# Patient Record
Sex: FEMALE | Race: WHITE | NOT HISPANIC OR LATINO | Employment: OTHER | URBAN - METROPOLITAN AREA
[De-identification: names, ages, dates, MRNs, and addresses within clinical notes are randomized per-mention and may not be internally consistent; named-entity substitution may affect disease eponyms.]

---

## 2017-01-06 ENCOUNTER — DOCUMENTATION ONLY (OUTPATIENT)
Dept: NEUROLOGY | Facility: CLINIC | Age: 47
End: 2017-01-06

## 2017-01-06 NOTE — PROGRESS NOTES
Received Physical Therapy Progress Note from Mariya GAMINO on 1/5/2016.  Placed in KK folder for review

## 2017-01-09 ENCOUNTER — DOCUMENTATION ONLY (OUTPATIENT)
Dept: NEUROLOGY | Facility: CLINIC | Age: 47
End: 2017-01-09

## 2017-01-09 NOTE — PROGRESS NOTES
Physical therapy progress note received from [facility].  Patient to continue with PT for estimated additional 4-6 weeks of therapy  Will upload note into [facility] for future reference

## 2017-01-10 RX ORDER — DIMETHYL FUMARATE 240 MG/1
CAPSULE ORAL
Qty: 180 CAPSULE | Refills: 0 | Status: CANCELLED | OUTPATIENT
Start: 2017-01-10

## 2017-02-03 ENCOUNTER — TELEPHONE (OUTPATIENT)
Dept: NEUROLOGY | Facility: CLINIC | Age: 47
End: 2017-02-03

## 2017-02-03 NOTE — TELEPHONE ENCOUNTER
----- Message from Britta Lynch sent at 2/3/2017  8:57 AM CST -----  Contact: Located within [facility] pharmacy     [phone]  Calling to request a refill for techfidera 240 mg.

## 2017-02-09 ENCOUNTER — OFFICE VISIT (OUTPATIENT)
Dept: NEUROLOGY | Facility: CLINIC | Age: 47
End: 2017-02-09
Payer: COMMERCIAL

## 2017-02-09 ENCOUNTER — TELEPHONE (OUTPATIENT)
Dept: NEUROLOGY | Facility: CLINIC | Age: 47
End: 2017-02-09

## 2017-02-09 ENCOUNTER — LAB VISIT (OUTPATIENT)
Dept: LAB | Facility: HOSPITAL | Age: 47
End: 2017-02-09
Attending: PSYCHIATRY & NEUROLOGY
Payer: COMMERCIAL

## 2017-02-09 VITALS
DIASTOLIC BLOOD PRESSURE: 68 MMHG | HEIGHT: 67 IN | WEIGHT: 179.81 LBS | BODY MASS INDEX: 28.22 KG/M2 | SYSTOLIC BLOOD PRESSURE: 102 MMHG

## 2017-02-09 DIAGNOSIS — E55.9 VITAMIN D INSUFFICIENCY: ICD-10-CM

## 2017-02-09 DIAGNOSIS — G35 MULTIPLE SCLEROSIS: Primary | ICD-10-CM

## 2017-02-09 DIAGNOSIS — G47.9 SLEEP DISTURBANCE: ICD-10-CM

## 2017-02-09 DIAGNOSIS — G35 MULTIPLE SCLEROSIS: Chronic | ICD-10-CM

## 2017-02-09 DIAGNOSIS — Z71.89 COUNSELING REGARDING GOALS OF CARE: ICD-10-CM

## 2017-02-09 DIAGNOSIS — Z79.899 ENCOUNTER FOR LONG-TERM (CURRENT) USE OF HIGH-RISK MEDICATION: ICD-10-CM

## 2017-02-09 DIAGNOSIS — G35 MULTIPLE SCLEROSIS: Primary | Chronic | ICD-10-CM

## 2017-02-09 DIAGNOSIS — R53.83 FATIGUE, UNSPECIFIED TYPE: ICD-10-CM

## 2017-02-09 DIAGNOSIS — M62.830 PARASPINAL MUSCLE SPASM: ICD-10-CM

## 2017-02-09 DIAGNOSIS — M62.838 MUSCLE SPASM: ICD-10-CM

## 2017-02-09 DIAGNOSIS — R25.2 SPASTICITY: ICD-10-CM

## 2017-02-09 LAB
25(OH)D3+25(OH)D2 SERPL-MCNC: 58 NG/ML
BASOPHILS # BLD AUTO: 0.01 K/UL
BASOPHILS NFR BLD: 0.2 %
DIFFERENTIAL METHOD: NORMAL
EOSINOPHIL # BLD AUTO: 0.1 K/UL
EOSINOPHIL NFR BLD: 1.4 %
ERYTHROCYTE [DISTWIDTH] IN BLOOD BY AUTOMATED COUNT: 13.4 %
HCT VFR BLD AUTO: 41.6 %
HGB BLD-MCNC: 14 G/DL
LYMPHOCYTES # BLD AUTO: 1.4 K/UL
LYMPHOCYTES NFR BLD: 28.4 %
MCH RBC QN AUTO: 30.6 PG
MCHC RBC AUTO-ENTMCNC: 33.7 %
MCV RBC AUTO: 91 FL
MONOCYTES # BLD AUTO: 0.4 K/UL
MONOCYTES NFR BLD: 7 %
NEUTROPHILS # BLD AUTO: 3.1 K/UL
NEUTROPHILS NFR BLD: 62.8 %
PLATELET # BLD AUTO: 336 K/UL
PMV BLD AUTO: 10.3 FL
RBC # BLD AUTO: 4.57 M/UL
WBC # BLD AUTO: 4.97 K/UL

## 2017-02-09 PROCEDURE — 82306 VITAMIN D 25 HYDROXY: CPT

## 2017-02-09 PROCEDURE — 85025 COMPLETE CBC W/AUTO DIFF WBC: CPT

## 2017-02-09 PROCEDURE — 36415 COLL VENOUS BLD VENIPUNCTURE: CPT

## 2017-02-09 PROCEDURE — 86359 T CELLS TOTAL COUNT: CPT

## 2017-02-09 PROCEDURE — 99215 OFFICE O/P EST HI 40 MIN: CPT | Mod: S$GLB,,, | Performed by: PHYSICIAN ASSISTANT

## 2017-02-09 PROCEDURE — 86360 T CELL ABSOLUTE COUNT/RATIO: CPT

## 2017-02-09 PROCEDURE — 99999 PR PBB SHADOW E&M-EST. PATIENT-LVL III: CPT | Mod: PBBFAC,,, | Performed by: PHYSICIAN ASSISTANT

## 2017-02-09 NOTE — PROGRESS NOTES
"Subjective:       Patient ID: Kasandra Garcia is a 46 y.o. female who presents today for a routine clinic visit for MS.    MS HPI:  · DMT: Tecfidera  · Side effects from DMT? No  · Taking vitamin D3 as recommended? Yes -  Dose: 5,000 and 6,000 rotation (last dose 2 wks ago)  · Patient states that she went to PT and this greatly helped, but she has stopped.   · Has tried Botox in the past and this was helpful, but it's slightly inconvenient to come to [place] for injections  · Rheumatologist treating fibromyalgia --with Lyrica and Nemanda  · Has been out of VitD for several weeks  · Sleep study done 8/2016-with no GILBERTO found but rather sleep disruption related to pain was noted    SOCIAL HISTORY  Social History   Substance Use Topics    Smoking status: Never Smoker    Smokeless tobacco: Never Used    Alcohol use No     Living arrangements - the patient lives with  and son.  Employment Retired    MS ROS:   · Fatigue: Yes -feels like Nemanda helps "get more done in a days time"  · Sleep Disturbance: Yes - Ambien ---waking up again due to muscle pain  · Bladder Dysfunction: No  · Bowel Dysfunction: No  · Spasticity: Yes, has not restarted Baclofen or Tizanidine--  · Visual Symptoms: No  · Cognitive: No  · Mood Disorder: No  · Gait Disturbance: Yes - balance  · Falls: No  · Hand Dysfunction: No  · Pain: Yes related to her fibromyalgia - taking Lyrica and now Nemanda  · Sexual Dysfunction: Not Assessed  · Skin Breakdown: No  · Tremors: No  · Dysphagia:  No  · Dysarthria:  No  · Heat sensitivity: Yes, more fatigue  · Any un-met adaptive needs? No  · Copay Assist?  Yes - 40/month  · Clinical Trial candidate? No        Objective:        1. 25 foot timed walk:3.8s previous visits  Timed 25 Foot Walk: 2/9/2017   Did patient wear an AFO? No   Was assistive device used? No   Time for 25 Foot Walk (seconds) 3.8       Neurologic Exam     Mental Status   Oriented to person, place, and time.   Follows 3 step commands. "   Speech: speech is normal   Level of consciousness: alert  Normal comprehension.     Cranial Nerves     CN II   Visual acuity: normal (20/20 OD/OS with Snellen hand held chart at 6 ft)    CN III, IV, VI   Pupils are equal, round, and reactive to light.  Extraocular motions are normal.     CN V   Facial sensation intact.     CN VII   Facial expression full, symmetric.     CN VIII   Hearing: intact (finger rub)    CN IX, X   Palate: symmetric    CN XI   CN XI normal.     CN XII   Tongue deviation: none    Motor Exam   Muscle bulk: normal  Overall muscle tone: normal    Strength   Right deltoid: 5/5  Left deltoid: 5/5  Right triceps: 5/5  Left triceps: 5/5  Right wrist extension: 5/5  Left wrist extension: 5/5  Right interossei: 5/5  Left interossei: 5/5  Right iliopsoas: 5/5  Left iliopsoas: 5/5  Right hamstrin/5  Left hamstrin/5  Right anterior tibial: 5/5  Left anterior tibial: 5/5  Right peroneal: 5/5  Left peroneal: 5/5       Paraspinal muscle spasms noted bilaterally     Sensory Exam   Light touch normal.   Right arm vibration: decreased from fingers  Left arm vibration: decreased from fingers  Right leg vibration: decreased from ankle  Left leg vibration: decreased from ankle    Gait, Coordination, and Reflexes     Gait  Gait: normal    Coordination   Finger to nose coordination: normal  Tandem walking coordination: normal    Tremor   Resting tremor: absent  Action tremor: absent    Reflexes   Right brachioradialis: 2+  Left brachioradialis: 2+  Right biceps: 2+  Left biceps: 2+  Right triceps: 2+  Left triceps: 2+  Right patellar: 2+  Left patellar: 2+  Right achilles: 1+  Left achilles: 1+  Right plantar: equivocal  Left plantar: equivocal  Right ankle clonus: absent  Left ankle clonus: absent  Right pendular knee jerk: absent  Left pendular knee jerk: absent       Normal Heel/toe walk  Normal RSM           Labs:   Labs today: CBC, CD8, Vit D3    Lab Results   Component Value Date    [id number] 50  01/11/2016    [id number] 44 01/13/2015    [id number] 89 07/22/2013     No results found for: JCVINDEX, JCVANTIBODY  Lab Results   Component Value Date    MW3BOVXX 65.2 07/29/2016    ABSOLUTECD3 1074 07/29/2016    XD0BUSZR 12.4 07/29/2016    ABSOLUTECD8 204 07/29/2016    LN9HKEZY 52.8 07/29/2016    ABSOLUTECD4 869 07/29/2016    LABCD48 4.27 (H) 07/29/2016       Diagnosis/Assessment/Plan:    1. Multiple Sclerosis  · Assessment: Patient continues to remain clinically stable on Tecfidera.   · Imaging: Deferring MRI's for 2017, will going forward assess MRI's every other year  · Disease Modifying Therapies: Continue Tecfidera and high dose Vit D3. Will check safety labs today for lymphopenia, discussed again PML risk. Labs today as noted above.  Emphasized restarting Vit D3.    2. MS Symptom Assessment / Management  · Sleep Disturbance: continue Ambien--Sleep study done --no GILBERTO noted  · Spasticity: I have suggested 2mg Tizanidine HS. Will return to PT, plan for Botox to paraspinals at next visit, suggested massage therapy as well and continued stretching/exercise                3.  Fibromyalgia managed by rheumatology    Over 50% of this 40 minute visit was spent in direct face to face counseling of the patient regarding her current symptoms and management of same.  Follow up in 6 months with me for follow up and Botox to paraspinal muscles  Patient agreed to POC today.    Attending, Dr. Matthews, was available during today's encounter. Any change to plan along with cosign to appear in the EMR.     Shakira West PA-C  MS Center    Multiple sclerosis  -     CBC auto differential; Future; Expected date: 2/9/17  -     HELPER-SUPPRESSOR RATIO; Future; Expected date: 2/9/17  -     Vitamin D; Future    Encounter for long-term (current) use of high-risk medication  -     CBC auto differential; Future; Expected date: 2/9/17  -     HELPER-SUPPRESSOR RATIO; Future; Expected date: 2/9/17    Muscle spasm    Fatigue, unspecified  type    Counseling regarding goals of care    Vitamin D insufficiency  -     Vitamin D; Future

## 2017-02-09 NOTE — TELEPHONE ENCOUNTER
----- Message from Shakira West PA-C sent at 2/9/2017 10:45 AM CST -----  Please get approval for 200 units--will plan to do at follow up visit

## 2017-02-10 LAB
ABSOLUTE CD3: 985 CELLS/UL (ref 700–2100)
ABSOLUTE CD8: 205 CELLS/UL (ref 200–900)
CD3%: 71.3 % (ref 55–83)
CD3+CD4+ CELLS # BLD: 771 CELLS/UL (ref 300–1400)
CD3+CD4+ CELLS NFR BLD: 55.8 % (ref 28–57)
CD4/CD8 RATIO: 3.76 (ref 0.9–3.6)
CD8 % SUPPRESSOR T CELL: 14.9 % (ref 10–39)

## 2017-02-14 ENCOUNTER — PATIENT MESSAGE (OUTPATIENT)
Dept: NEUROLOGY | Facility: CLINIC | Age: 47
End: 2017-02-14

## 2017-02-14 DIAGNOSIS — G35 MULTIPLE SCLEROSIS: ICD-10-CM

## 2017-02-14 RX ORDER — DIMETHYL FUMARATE 240 MG/1
240 CAPSULE ORAL 2 TIMES DAILY
Qty: 180 CAPSULE | Refills: 1 | Status: SHIPPED | OUTPATIENT
Start: 2017-02-14 | End: 2017-07-23 | Stop reason: SDUPTHER

## 2017-03-24 DIAGNOSIS — G47.00 INSOMNIA: ICD-10-CM

## 2017-03-27 RX ORDER — ZOLPIDEM TARTRATE 5 MG/1
TABLET ORAL
Qty: 90 TABLET | Refills: 1 | Status: SHIPPED | OUTPATIENT
Start: 2017-03-27 | End: 2017-09-12 | Stop reason: SDUPTHER

## 2017-07-23 DIAGNOSIS — G35 MULTIPLE SCLEROSIS: ICD-10-CM

## 2017-07-24 RX ORDER — DIMETHYL FUMARATE 240 MG/1
CAPSULE ORAL
Qty: 180 CAPSULE | Refills: 0 | Status: SHIPPED | OUTPATIENT
Start: 2017-07-24 | End: 2017-10-18 | Stop reason: SDUPTHER

## 2017-07-25 ENCOUNTER — DOCUMENTATION ONLY (OUTPATIENT)
Dept: NEUROLOGY | Facility: CLINIC | Age: 47
End: 2017-07-25

## 2017-08-08 ENCOUNTER — OFFICE VISIT (OUTPATIENT)
Dept: NEUROLOGY | Facility: CLINIC | Age: 47
End: 2017-08-08
Payer: COMMERCIAL

## 2017-08-08 VITALS
DIASTOLIC BLOOD PRESSURE: 73 MMHG | HEIGHT: 67 IN | HEART RATE: 71 BPM | WEIGHT: 176 LBS | SYSTOLIC BLOOD PRESSURE: 108 MMHG | BODY MASS INDEX: 27.62 KG/M2

## 2017-08-08 DIAGNOSIS — M62.838 MUSCLE SPASM: ICD-10-CM

## 2017-08-08 DIAGNOSIS — R25.2 SPASTICITY: ICD-10-CM

## 2017-08-08 DIAGNOSIS — Z79.899 ENCOUNTER FOR LONG-TERM (CURRENT) USE OF HIGH-RISK MEDICATION: ICD-10-CM

## 2017-08-08 DIAGNOSIS — G35 MULTIPLE SCLEROSIS: Primary | Chronic | ICD-10-CM

## 2017-08-08 DIAGNOSIS — N31.9 NEUROGENIC BLADDER: ICD-10-CM

## 2017-08-08 DIAGNOSIS — Z71.89 COUNSELING REGARDING GOALS OF CARE: ICD-10-CM

## 2017-08-08 DIAGNOSIS — R53.82 CHRONIC FATIGUE: ICD-10-CM

## 2017-08-08 PROCEDURE — 3008F BODY MASS INDEX DOCD: CPT | Mod: S$GLB,,, | Performed by: PHYSICIAN ASSISTANT

## 2017-08-08 PROCEDURE — 99999 PR PBB SHADOW E&M-EST. PATIENT-LVL III: CPT | Mod: PBBFAC,,, | Performed by: PHYSICIAN ASSISTANT

## 2017-08-08 PROCEDURE — 99215 OFFICE O/P EST HI 40 MIN: CPT | Mod: S$GLB,,, | Performed by: PHYSICIAN ASSISTANT

## 2017-08-08 RX ORDER — TIZANIDINE 4 MG/1
TABLET ORAL
Qty: 270 TABLET | Refills: 1 | Status: SHIPPED | OUTPATIENT
Start: 2017-08-08 | End: 2018-08-02

## 2017-08-08 NOTE — LETTER
August 8, 2017      Fauzia Matthews MD  [address]  [facility] [address]           [facility]  [address]  [facility] [address]  Phone: [phone]          Patient: Kasandra Garcia   MR Number: [id number]   YOB: 1970   Date of Visit: 8/8/2017       Dear Dr. Fauzia Matthews:    Thank you for referring Kasandra Garcia to me for evaluation. Attached you will find relevant portions of my assessment and plan of care.    If you have questions, please do not hesitate to call me. I look forward to following Kasandra Garcia along with you.    Sincerely,    Shakira West PA-C    Enclosure  CC:  No Recipients    If you would like to receive this communication electronically, please contact [email or web address] or [phone] to request more information on [employer] Link access.    For providers and/or their staff who would like to refer a patient to [facility], please contact us through our one-stop-shop provider referral line, [facility], [place], at [phone].    If you feel you have received this communication in error or would no longer like to receive these types of communications, please e-mail [email or web address]

## 2017-08-08 NOTE — PROGRESS NOTES
"Subjective:       Patient ID: Kasandra Garcia is a 47 y.o. female who presents today for a routine clinic visit for MS.    MS HPI:  · DMT: Tecfidera  · Side effects from DMT? No  · Taking vitamin D3 as recommended? Yes - 5,000/10,000IU alternating([employer])  · Patient has been doing stretching  · Patient states that her her rheumatologist has started her on supplement with B-12/tumeric/folate and she feels improved.(Rheumate-prescription) --Dr. Marcos([facility])    SOCIAL HISTORY  Social History   Substance Use Topics    Smoking status: Never Smoker    Smokeless tobacco: Never Used    Alcohol use No     Living arrangements - the patient lives with their family.  Employment     MS ROS:  · Fatigue: Yes - will occasionally rest during the day  · Sleep Disturbance: Yes - Ambien ---waking up again due to muscle pain. Does feel like her sleep is improved in that she is able to return to sleep fairly quickly  · Bladder Dysfunction: has a kidney stone currently. Sees Dr. Yeager in [place]   · Bowel Dysfunction: No  · Spasticity: Yes, will take 1/2 tablet tizanidine HS(2mg) and occasionally take 1-2 tablets. Will occasionally take Baclofen at night as well "on a bad night". Back spasms present but do seem improved with stretching  · Visual Symptoms: No  · Cognitive: No  · Mood Disorder: No  · Gait Disturbance: Yes - balance at times  · Falls: fallen twice--going down stairs-states L ankle "gave out"  · Hand Dysfunction: No  · Pain: Yes related to her fibromyalgia - taking Lyrica. Having pain in R hip that has been ongoing for several months(x-ray done by rheumatologist and negative per patient)  · Sexual Dysfunction: Not Assessed  · Skin Breakdown: No  · Tremors: No  · Dysphagia:  No  · Dysarthria:  No  · Heat sensitivity: Yes, more fatigue  · Any un-met adaptive needs? No  · Copay Assist?  Yes - 40/month  · Clinical Trial candidate? No      Objective:        1. 25 foot timed walk:  Timed 25 Foot Walk: " 2017   Did patient wear an AFO? No No   Was assistive device used? No No   Time for 25 Foot Walk (seconds) 3.8 3.4     Neurologic Exam     Mental Status   Oriented to person, place, and time.   Follows 3 step commands.   Speech: speech is normal   Level of consciousness: alert  Normal comprehension.     Cranial Nerves     CN II   Visual acuity: normal (20/20 Od and OS with Snellen hand held chart at 6 ft)    CN III, IV, VI   Pupils are equal, round, and reactive to light.  Extraocular motions are normal.     CN V   Facial sensation intact.     CN VII   Facial expression full, symmetric.     CN VIII   Hearing: intact (finger rub)    CN IX, X   Palate: symmetric    CN XI   CN XI normal.     CN XII   Tongue deviation: none    Motor Exam   Muscle bulk: normal  Overall muscle tone: normal    Strength   Right deltoid: 5/5  Left deltoid: 5/5  Right triceps: 5/5  Left triceps: 5/5  Right wrist extension: 5/5  Left wrist extension: 5/5  Right interossei: 5/5  Left interossei: 5/5  Right iliopsoas: 5/5  Left iliopsoas: 5/5  Right hamstrin/5  Left hamstrin/5  Right anterior tibial: 5/5  Left anterior tibial: 5/5  Right peroneal: 5/5  Left peroneal: 5/5    Sensory Exam   Right arm vibration: normal  Left arm vibration: normal  Right leg vibration: decreased from toes  Left leg vibration: decreased from toes    Gait, Coordination, and Reflexes     Gait  Gait: normal    Coordination   Finger to nose coordination: normal  Tandem walking coordination: normal    Tremor   Resting tremor: absent  Action tremor: absent    Reflexes   Right brachioradialis: 2+  Left brachioradialis: 2+  Right biceps: 2+  Left biceps: 2+  Right triceps: 2+  Left triceps: 2+  Right patellar: 2+  Left patellar: 2+  Right achilles: 2+  Left achilles: 2+  Right plantar: equivocal  Left plantar: equivocal  Right ankle clonus: absent  Left ankle clonus: absent  Right pendular knee jerk: absent  Left pendular knee jerk: absentNormal Heel/toe  walk  Normal RSM         Labs:   Ordered today: CBC, CD8      Lab Results   Component Value Date    [id number] 58 02/09/2017    TTEFLXTR47IF 50 01/11/2016    FRNFXKIY32LF 44 01/13/2015     No results found for: JCVINDEX, JCVANTIBODY  Lab Results   Component Value Date    CC2MZEBC 71.3 02/09/2017    ABSOLUTECD3 985 02/09/2017    MM0DMNLX 14.9 02/09/2017    ABSOLUTECD8 205 02/09/2017    DM4LMRNV 55.8 02/09/2017    ABSOLUTECD4 771 02/09/2017    LABCD48 3.76 (H) 02/09/2017       Diagnosis/Assessment/Plan:    1. Multiple Sclerosis  · Assessment: patient is clinically stable on Tecfidera and Vit D. Her timed walk is improved today  · Imaging:deferred for 2017- will plan for 2018  · Disease Modifying Therapies: Continue Tecfidera and Vit D3(alternating 5,000 and 10,000IU/d). Will check safety labs today to assess for lymphopenia.     2. MS Symptom Assessment / Management  · Spasticity: refilled Tizanidine(90 day supply)  · Pain:  Consider Aleve for 3 days as directed and reassess R hip pain(possible bursitis)    Over 50% of this 40 minute visit was spent in direct face to face counseling of the patient regarding her current symptoms and management of same.  Follow up in 6 months with Dr. Matthews  Patient agreed to POC today.    Attending, Dr. Matthews, was available during today's encounter. Any change to plan along with cosign to appear in the EMR.     Shakira West PA-C  MS Center        Multiple sclerosis  -     CBC auto differential; Future; Expected date: 08/08/2017  -     HELPER-SUPPRESSOR RATIO; Future; Expected date: 08/08/2017    Neurogenic bladder    Spasticity  -     tizanidine (ZANAFLEX) 4 MG tablet; Take 3 tablets by mouth at bedtime.  Dispense: 270 tablet; Refill: 1    Encounter for long-term (current) use of high-risk medication    Muscle spasm    Counseling regarding goals of care    Chronic fatigue

## 2017-09-11 RX ORDER — RIZATRIPTAN BENZOATE 10 MG/1
TABLET ORAL
Qty: 15 TABLET | Refills: 1 | Status: SHIPPED | OUTPATIENT
Start: 2017-09-11 | End: 2017-12-11 | Stop reason: SDUPTHER

## 2017-09-12 DIAGNOSIS — F51.01 PRIMARY INSOMNIA: ICD-10-CM

## 2017-09-12 RX ORDER — ZOLPIDEM TARTRATE 5 MG/1
TABLET ORAL
Qty: 90 TABLET | Refills: 1 | Status: SHIPPED | OUTPATIENT
Start: 2017-09-12 | End: 2018-02-20 | Stop reason: SDUPTHER

## 2017-10-16 ENCOUNTER — TELEPHONE (OUTPATIENT)
Dept: NEUROLOGY | Facility: CLINIC | Age: 47
End: 2017-10-16

## 2017-10-16 NOTE — TELEPHONE ENCOUNTER
----- Message from Becky Ahumada sent at 10/16/2017  8:34 AM CDT -----  Contact: Patient [phone]  Patient states she is returning call to schedule follow up appt. Please call

## 2017-10-18 DIAGNOSIS — G35 MULTIPLE SCLEROSIS: ICD-10-CM

## 2017-10-18 RX ORDER — DIMETHYL FUMARATE 240 MG/1
CAPSULE ORAL
Qty: 180 CAPSULE | Refills: 0 | Status: SHIPPED | OUTPATIENT
Start: 2017-10-18 | End: 2018-01-07 | Stop reason: SDUPTHER

## 2017-12-11 RX ORDER — RIZATRIPTAN BENZOATE 10 MG/1
TABLET ORAL
Qty: 15 TABLET | Refills: 1 | Status: SHIPPED | OUTPATIENT
Start: 2017-12-11 | End: 2018-08-02 | Stop reason: SDUPTHER

## 2018-01-07 DIAGNOSIS — G35 MULTIPLE SCLEROSIS: ICD-10-CM

## 2018-01-08 RX ORDER — DIMETHYL FUMARATE 240 MG/1
CAPSULE ORAL
Qty: 180 CAPSULE | Refills: 0 | Status: SHIPPED | OUTPATIENT
Start: 2018-01-08 | End: 2018-04-04 | Stop reason: SDUPTHER

## 2018-02-06 ENCOUNTER — PATIENT MESSAGE (OUTPATIENT)
Dept: NEUROLOGY | Facility: CLINIC | Age: 48
End: 2018-02-06

## 2018-02-06 ENCOUNTER — OFFICE VISIT (OUTPATIENT)
Dept: NEUROLOGY | Facility: CLINIC | Age: 48
End: 2018-02-06
Payer: COMMERCIAL

## 2018-02-06 ENCOUNTER — LAB VISIT (OUTPATIENT)
Dept: LAB | Facility: HOSPITAL | Age: 48
End: 2018-02-06
Attending: PSYCHIATRY & NEUROLOGY
Payer: COMMERCIAL

## 2018-02-06 VITALS
BODY MASS INDEX: 27.32 KG/M2 | HEIGHT: 66 IN | HEART RATE: 76 BPM | WEIGHT: 170 LBS | DIASTOLIC BLOOD PRESSURE: 74 MMHG | SYSTOLIC BLOOD PRESSURE: 113 MMHG

## 2018-02-06 DIAGNOSIS — G35 MS (MULTIPLE SCLEROSIS): ICD-10-CM

## 2018-02-06 DIAGNOSIS — Z29.89 PROPHYLACTIC IMMUNOTHERAPY: ICD-10-CM

## 2018-02-06 DIAGNOSIS — G35 MS (MULTIPLE SCLEROSIS): Primary | ICD-10-CM

## 2018-02-06 DIAGNOSIS — Z79.899 ENCOUNTER FOR LONG-TERM (CURRENT) USE OF HIGH-RISK MEDICATION: ICD-10-CM

## 2018-02-06 DIAGNOSIS — Z71.89 COUNSELING REGARDING GOALS OF CARE: ICD-10-CM

## 2018-02-06 LAB
25(OH)D3+25(OH)D2 SERPL-MCNC: 51 NG/ML
BASOPHILS # BLD AUTO: 0.03 K/UL
BASOPHILS NFR BLD: 0.6 %
DIFFERENTIAL METHOD: NORMAL
EOSINOPHIL # BLD AUTO: 0.1 K/UL
EOSINOPHIL NFR BLD: 1.2 %
ERYTHROCYTE [DISTWIDTH] IN BLOOD BY AUTOMATED COUNT: 12.6 %
HCT VFR BLD AUTO: 41.8 %
HGB BLD-MCNC: 13.9 G/DL
IMM GRANULOCYTES # BLD AUTO: 0.01 K/UL
IMM GRANULOCYTES NFR BLD AUTO: 0.2 %
LYMPHOCYTES # BLD AUTO: 1.2 K/UL
LYMPHOCYTES NFR BLD: 23.6 %
MCH RBC QN AUTO: 30.5 PG
MCHC RBC AUTO-ENTMCNC: 33.3 G/DL
MCV RBC AUTO: 92 FL
MONOCYTES # BLD AUTO: 0.4 K/UL
MONOCYTES NFR BLD: 7.4 %
NEUTROPHILS # BLD AUTO: 3.5 K/UL
NEUTROPHILS NFR BLD: 67 %
NRBC BLD-RTO: 0 /100 WBC
PLATELET # BLD AUTO: 335 K/UL
PMV BLD AUTO: 10 FL
RBC # BLD AUTO: 4.56 M/UL
WBC # BLD AUTO: 5.17 K/UL

## 2018-02-06 PROCEDURE — 86359 T CELLS TOTAL COUNT: CPT

## 2018-02-06 PROCEDURE — 99214 OFFICE O/P EST MOD 30 MIN: CPT | Mod: S$GLB,,, | Performed by: PSYCHIATRY & NEUROLOGY

## 2018-02-06 PROCEDURE — 86360 T CELL ABSOLUTE COUNT/RATIO: CPT

## 2018-02-06 PROCEDURE — 82306 VITAMIN D 25 HYDROXY: CPT

## 2018-02-06 PROCEDURE — 99999 PR PBB SHADOW E&M-EST. PATIENT-LVL III: CPT | Mod: PBBFAC,,, | Performed by: PSYCHIATRY & NEUROLOGY

## 2018-02-06 PROCEDURE — 85025 COMPLETE CBC W/AUTO DIFF WBC: CPT

## 2018-02-06 PROCEDURE — 36415 COLL VENOUS BLD VENIPUNCTURE: CPT

## 2018-02-06 PROCEDURE — 3008F BODY MASS INDEX DOCD: CPT | Mod: S$GLB,,, | Performed by: PSYCHIATRY & NEUROLOGY

## 2018-02-06 RX ORDER — OXYBUTYNIN CHLORIDE 5 MG/1
TABLET ORAL
COMMUNITY
Start: 2017-12-20 | End: 2018-08-02

## 2018-02-06 RX ORDER — PREGABALIN 100 MG/1
100 CAPSULE ORAL
COMMUNITY
Start: 2017-11-07 | End: 2018-11-07

## 2018-02-06 RX ORDER — ACETAMINOPHEN 500 MG
TABLET ORAL
COMMUNITY
End: 2019-02-15

## 2018-02-06 RX ORDER — ESTRADIOL 0.05 MG/D
FILM, EXTENDED RELEASE TRANSDERMAL
COMMUNITY
Start: 2016-09-07 | End: 2022-12-29

## 2018-02-06 RX ORDER — DIAZEPAM 5 MG/1
TABLET ORAL
Qty: 3 TABLET | Refills: 0 | Status: SHIPPED | OUTPATIENT
Start: 2018-02-06 | End: 2018-08-02

## 2018-02-06 RX ORDER — ZOLPIDEM TARTRATE 5 MG/1
5 TABLET ORAL
COMMUNITY
End: 2018-02-20 | Stop reason: SDUPTHER

## 2018-02-06 RX ORDER — RIZATRIPTAN BENZOATE 10 MG/1
10 TABLET ORAL
COMMUNITY
End: 2018-08-02 | Stop reason: SDUPTHER

## 2018-02-06 NOTE — PROGRESS NOTES
Subjective:       Patient ID: Kasandra Garcia is a 47 y.o. female who presents today for a routine clinic visit for MS.      MS HPI:  · DMT: Tecfidera  · Side effects from DMT? Yes - flushing from time to time  · Taking vitamin D3 as recommended? Yes -  Dose: 10,000 alt with 5,000    · Feeling stable; no new neuro sx; no concern for relapse;   · She started getting massages once/month;   · She has lost 30 pounds;     SOCIAL HISTORY  Social History   Substance Use Topics    Smoking status: Never Smoker    Smokeless tobacco: Never Used    Alcohol use No     Living arrangements - the patient lives with their family.  Employment : Disabled; [employer]    MS ROS:  · Fatigue: Yes - no longer on a stimulant;    · Sleep Disturbance: Yes -  Much better;    · Bladder Dysfunction: Yes - improved; no longer on ditropan  · Bowel Dysfunction: No  · Spasticity: Yes - rarely takes hs baclofen  · Visual Symptoms: Yes - age related reading;   · Cognitive: Yes - stable;  Not better not worse;   · Mood Disorder: Yes - stable; not depressed;   · Gait Disturbance: No  · Falls: No  · Hand Dysfunction: No  · Pain: Yes - from fibromyalgia; Lyrica helps, prescribed by Dr. Marie   · Sexual Dysfunction: Not Assessed  · Skin Breakdown: No  · Tremors: No  · Dysphagia:  No  · Dysarthria:  No  · Heat sensitivity:  No  · Any un-met adaptive needs? No  · Copay Assist?  Yes - $0  · Clinical Trial candidate? No          Objective:        25 foot timed walk: 3.42 seconds without assist;   Timed 25 Foot Walk: 2/9/2017 8/8/2017   Did patient wear an AFO? No No   Was assistive device used? No No   Time for 25 Foot Walk (seconds) 3.8 3.4     Neurologic Exam      MENTAL STATUS: Grossly intact    CRANIAL NERVE EXAM: There is no internuclear ophthalmoplegia. Extraocular   muscles are intact. Pupils are equal, round, and reactive to light. No   facial asymmetry. There is no dysarthria.     MOTOR EXAM: She has increased tone, but is mild  in upper extremities. No   pronator drift; rapid sequential movements are normal; left hand IO 5-/5, left deltoid 5-/5     REFLEXES: 2+ throughout in all groups except at the ankle jerks, which are   1+. She has bilateral Babinskis.     SENSORY EXAM: slight decrease vibration in LLE distally as compared to right;     COORDINATION: She has dystaxia on the left finger-to-nose evaluation.     GAIT: narrow based and stable       Imaging:     Results for orders placed during the hospital encounter of 07/29/16   MRI Brain W WO Contrast    Impression      Stable exam with multifocal areas of white matter signal abnormality as above, in keeping with history of multiple sclerosis.  No new lesions or evidence of active demyelination.  ______________________________________     Electronically signed by: MELISSA ABEL MD  Date:     07/29/16  Time:    12:51      Results for orders placed during the hospital encounter of 07/29/16   MRI Cervical Spine W WO Cont    Impression Unchanged patchy cord signal abnormality throughout cervical cord most in keeping with chronic multifocal demyelinating plaque in this patient with known history of multiple sclerosis.  No new lesions or evidence of active demyelination.        ______________________________________     Electronically signed by: MELISSA ABEL MD  Date:     07/29/16  Time:    13:08      Results for orders placed in visit on 03/05/12   MRI Thoracic Spine W WO Cont         Labs:     Lab Results   Component Value Date    CPTLRIWO21XS 58 02/09/2017    XSOWWZKP79UQ 50 01/11/2016    EFHYYLJQ16DY 44 01/13/2015     No results found for: JCVINDEX, JCVANTIBODY  Lab Results   Component Value Date    YT2ZHFTF 71.6 08/08/2017    ABSOLUTECD3 1228 08/08/2017    YZ8MHRLG 15.2 08/08/2017    ABSOLUTECD8 261 08/08/2017    LN0RKHGK 56.8 08/08/2017    ABSOLUTECD4 975 08/08/2017    LABCD48 3.74 (H) 08/08/2017     Lab Results   Component Value Date    WBC 6.13 08/08/2017    RBC 4.45 08/08/2017     HGB 13.6 08/08/2017    HCT 40.4 08/08/2017    MCV 91 08/08/2017    MCH 30.6 08/08/2017    MCHC 33.7 08/08/2017    RDW 12.8 08/08/2017     08/08/2017    MPV 10.1 08/08/2017    GRAN 3.8 08/08/2017    GRAN 62.3 08/08/2017    LYMPH 1.8 08/08/2017    LYMPH 28.9 08/08/2017    MONO 0.4 08/08/2017    MONO 6.7 08/08/2017    EOS 0.1 08/08/2017    BASO 0.02 08/08/2017    EOSINOPHIL 1.6 08/08/2017    BASOPHIL 0.3 08/08/2017     Sodium   Date Value Ref Range Status   01/22/2014 141 136 - 145 mmol/L Final     Potassium   Date Value Ref Range Status   01/22/2014 3.9 3.5 - 5.1 mmol/L Final     Chloride   Date Value Ref Range Status   01/22/2014 104 95 - 110 mmol/L Final     CO2   Date Value Ref Range Status   01/22/2014 28 23 - 29 mmol/L Final     Glucose   Date Value Ref Range Status   01/22/2014 111 (H) 70 - 110 mg/dL Final     BUN, Bld   Date Value Ref Range Status   01/22/2014 14 6 - 20 mg/dL Final     Creatinine   Date Value Ref Range Status   01/22/2014 0.9 0.5 - 1.4 mg/dL Final     Calcium   Date Value Ref Range Status   01/22/2014 9.4 8.7 - 10.5 mg/dL Final     Total Protein   Date Value Ref Range Status   01/22/2014 7.6 6.0 - 8.4 g/dL Final     Albumin   Date Value Ref Range Status   01/22/2014 3.9 3.5 - 5.2 g/dL Final     Total Bilirubin   Date Value Ref Range Status   01/22/2014 0.2 0.1 - 1.0 mg/dL Final     Comment:     For infants and newborns, interpretation of results should be based  on gestational age, weight and in agreement with clinical  observations.  Premature Infant recommended reference ranges:  Up to 24 hours.............<8.0 mg/dL  Up to 48 hours............<12.0 mg/dL  3-5 days..................<15.0 mg/dL  6-29 days.................<15.0 mg/dL     Alkaline Phosphatase   Date Value Ref Range Status   01/22/2014 85 55 - 135 U/L Final     AST   Date Value Ref Range Status   01/22/2014 23 10 - 40 U/L Final     ALT   Date Value Ref Range Status   01/22/2014 21 10 - 44 U/L Final     Anion Gap   Date  Value Ref Range Status   01/22/2014 9 8 - 16 mmol/L Final     eGFR if    Date Value Ref Range Status   01/22/2014 >60.0 >60 mL/min/1.73 m^2 Final     eGFR if non    Date Value Ref Range Status   01/22/2014 >60.0 >60 mL/min/1.73 m^2 Final     Comment:     Calculation used to obtain the estimated glomerular filtration  rate (eGFR) is the CKD-EPI equation. Since race is unknown   in our information system, the eGFR values for   -American and Non--American patients are given   for each creatinine result.         Diagnosis/Assessment/Plan:    1. Multiple Sclerosis  · Assessment: Pt clinically stable on Tecfidera;   · Imaging:  MRI brain planned July 2018; pt claustrophobic, will increase valium dose to 15mg prior toMRI; she will have someone drive her to and from  · Disease Modifying Therapies: continue Tecfidera; check safety labs today; The patient was counseled about the risks associated with immune suppressive therapy, including a higher risk of serious infections and malignancy, as well as the importance of avoiding all live virus vaccines while on immune suppressive medication.  Continue vitamin D3. Check level today;     2. MS Symptom Assessment / Management  · No other changes to regimen described in ROS above        Over 50% of this 30 minute visit was spent in direct face to face counseling of the patient about MS, DMT considerations, and MS symptom management.     F/u 6 mo with Shakira West PA-C after MRI    MS (multiple sclerosis)  -     Vitamin D; Future  -     CBC auto differential; Future; Expected date: 02/06/2018  -     [place]-Suppressor Ratio; Future; Expected date: 02/06/2018  -     MRI Brain W WO Contrast; Future; Expected date: 02/06/2018    Other orders  -     diazePAM (VALIUM) 5 MG tablet; Take 3 tabs po 45 min prior to MRI  Dispense: 3 tablet; Refill: 0

## 2018-02-06 NOTE — Clinical Note
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 Do you happen to know if she would be eligible for Medicare?

## 2018-02-07 ENCOUNTER — TELEPHONE (OUTPATIENT)
Dept: NEUROLOGY | Facility: CLINIC | Age: 48
End: 2018-02-07

## 2018-02-07 LAB
ABSOLUTE CD3: 1029 CELLS/UL (ref 700–2100)
ABSOLUTE CD8: 195 CELLS/UL (ref 200–900)
CD3%: 73.6 % (ref 55–83)
CD3+CD4+ CELLS # BLD: 833 CELLS/UL (ref 300–1400)
CD3+CD4+ CELLS NFR BLD: 59.6 % (ref 28–57)
CD4/CD8 RATIO: 4.28 (ref 0.9–3.6)
CD8 % SUPPRESSOR T CELL: 13.9 % (ref 10–39)

## 2018-02-07 NOTE — TELEPHONE ENCOUNTER
Phoned pt to discuss referral from Dr. Matthews regarding pt's disability under [employer].  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  So, it's her understanding that she's not eligible for SSDI.  She states she has paid Medicare taxes.  According to Medicare web site it appears that pt would not be able to receive Medicare prior to age 65 unless SSA declare her disabled.  However, I agreed to phone the [facility] office to inquire further.  Office was closed at this time, so I will call again tomorrow.    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 Do you happen to know if she would be eligible for Medicare?

## 2018-02-09 NOTE — TELEPHONE ENCOUNTER
Phoned [facility] and learned that pt could qualify for the Medicare Qualified  (MQ[employer]) benefit.  Provided this information to pt and advised her to schedule and appointment with her local [facility] office to apply.

## 2018-02-20 DIAGNOSIS — F51.01 PRIMARY INSOMNIA: ICD-10-CM

## 2018-02-20 RX ORDER — ZOLPIDEM TARTRATE 5 MG/1
TABLET ORAL
Qty: 90 TABLET | Refills: 1 | Status: SHIPPED | OUTPATIENT
Start: 2018-02-20 | End: 2018-08-14 | Stop reason: SDUPTHER

## 2018-04-04 DIAGNOSIS — G35 MULTIPLE SCLEROSIS: ICD-10-CM

## 2018-04-04 RX ORDER — DIMETHYL FUMARATE 240 MG/1
CAPSULE ORAL
Qty: 180 CAPSULE | Refills: 1 | Status: SHIPPED | OUTPATIENT
Start: 2018-04-04 | End: 2018-09-24 | Stop reason: SDUPTHER

## 2018-07-18 ENCOUNTER — PATIENT MESSAGE (OUTPATIENT)
Dept: NEUROLOGY | Facility: CLINIC | Age: 48
End: 2018-07-18

## 2018-07-20 ENCOUNTER — PATIENT MESSAGE (OUTPATIENT)
Dept: NEUROLOGY | Facility: CLINIC | Age: 48
End: 2018-07-20

## 2018-07-27 ENCOUNTER — HOSPITAL ENCOUNTER (OUTPATIENT)
Dept: RADIOLOGY | Facility: HOSPITAL | Age: 48
Discharge: HOME | End: 2018-07-27
Attending: PSYCHIATRY & NEUROLOGY
Payer: COMMERCIAL

## 2018-07-27 DIAGNOSIS — G35 MS (MULTIPLE SCLEROSIS): ICD-10-CM

## 2018-07-27 PROCEDURE — 70553 MRI BRAIN STEM W/O & W/DYE: CPT | Mod: TC

## 2018-07-27 PROCEDURE — A9585 GADOBUTROL INJECTION: HCPCS | Performed by: PSYCHIATRY & NEUROLOGY

## 2018-07-27 PROCEDURE — 25500020 PHARM REV CODE 255: Performed by: PSYCHIATRY & NEUROLOGY

## 2018-07-27 RX ORDER — GADOBUTROL 604.72 MG/ML
7 INJECTION INTRAVENOUS
Status: COMPLETED | OUTPATIENT
Start: 2018-07-27 | End: 2018-07-27

## 2018-07-27 RX ADMIN — GADOBUTROL 7 ML: 604.72 INJECTION INTRAVENOUS at 12:07

## 2018-07-30 ENCOUNTER — PATIENT MESSAGE (OUTPATIENT)
Dept: NEUROLOGY | Facility: CLINIC | Age: 48
End: 2018-07-30

## 2018-08-02 ENCOUNTER — LAB VISIT (OUTPATIENT)
Dept: LAB | Facility: HOSPITAL | Age: 48
End: 2018-08-02
Payer: COMMERCIAL

## 2018-08-02 ENCOUNTER — OFFICE VISIT (OUTPATIENT)
Dept: NEUROLOGY | Facility: CLINIC | Age: 48
End: 2018-08-02
Payer: COMMERCIAL

## 2018-08-02 VITALS
BODY MASS INDEX: 27.32 KG/M2 | SYSTOLIC BLOOD PRESSURE: 116 MMHG | WEIGHT: 170 LBS | HEIGHT: 66 IN | DIASTOLIC BLOOD PRESSURE: 77 MMHG | HEART RATE: 68 BPM

## 2018-08-02 DIAGNOSIS — G47.9 SLEEP DISTURBANCE: ICD-10-CM

## 2018-08-02 DIAGNOSIS — G43.101 MIGRAINE WITH AURA AND WITH STATUS MIGRAINOSUS, NOT INTRACTABLE: ICD-10-CM

## 2018-08-02 DIAGNOSIS — G35 MULTIPLE SCLEROSIS: Primary | ICD-10-CM

## 2018-08-02 DIAGNOSIS — M79.7 FIBROMYALGIA: ICD-10-CM

## 2018-08-02 DIAGNOSIS — Z79.899 ENCOUNTER FOR LONG-TERM (CURRENT) USE OF HIGH-RISK MEDICATION: ICD-10-CM

## 2018-08-02 DIAGNOSIS — E55.9 VITAMIN D INSUFFICIENCY: ICD-10-CM

## 2018-08-02 DIAGNOSIS — Z71.89 COUNSELING REGARDING GOALS OF CARE: ICD-10-CM

## 2018-08-02 DIAGNOSIS — G35 MULTIPLE SCLEROSIS: ICD-10-CM

## 2018-08-02 LAB
ALBUMIN SERPL BCP-MCNC: 4.2 G/DL
ALP SERPL-CCNC: 78 U/L
ALT SERPL W/O P-5'-P-CCNC: 13 U/L
AST SERPL-CCNC: 14 U/L
BASOPHILS # BLD AUTO: 0.04 K/UL
BASOPHILS NFR BLD: 0.7 %
BILIRUB DIRECT SERPL-MCNC: 0.2 MG/DL
BILIRUB SERPL-MCNC: 0.5 MG/DL
DIFFERENTIAL METHOD: ABNORMAL
EOSINOPHIL # BLD AUTO: 0.1 K/UL
EOSINOPHIL NFR BLD: 2.3 %
ERYTHROCYTE [DISTWIDTH] IN BLOOD BY AUTOMATED COUNT: 12.6 %
HCT VFR BLD AUTO: 40 %
HGB BLD-MCNC: 13.4 G/DL
IMM GRANULOCYTES # BLD AUTO: 0.02 K/UL
IMM GRANULOCYTES NFR BLD AUTO: 0.3 %
LYMPHOCYTES # BLD AUTO: 1.6 K/UL
LYMPHOCYTES NFR BLD: 27.1 %
MCH RBC QN AUTO: 31.2 PG
MCHC RBC AUTO-ENTMCNC: 33.5 G/DL
MCV RBC AUTO: 93 FL
MONOCYTES # BLD AUTO: 0.5 K/UL
MONOCYTES NFR BLD: 8 %
NEUTROPHILS # BLD AUTO: 3.5 K/UL
NEUTROPHILS NFR BLD: 61.6 %
NRBC BLD-RTO: 0 /100 WBC
PLATELET # BLD AUTO: 354 K/UL
PMV BLD AUTO: 9.6 FL
PROT SERPL-MCNC: 7.5 G/DL
RBC # BLD AUTO: 4.3 M/UL
WBC # BLD AUTO: 5.75 K/UL

## 2018-08-02 PROCEDURE — 86360 T CELL ABSOLUTE COUNT/RATIO: CPT

## 2018-08-02 PROCEDURE — 99999 PR PBB SHADOW E&M-EST. PATIENT-LVL III: CPT | Mod: PBBFAC,,, | Performed by: PHYSICIAN ASSISTANT

## 2018-08-02 PROCEDURE — 99215 OFFICE O/P EST HI 40 MIN: CPT | Mod: S$GLB,,, | Performed by: PHYSICIAN ASSISTANT

## 2018-08-02 PROCEDURE — 36415 COLL VENOUS BLD VENIPUNCTURE: CPT

## 2018-08-02 PROCEDURE — 3008F BODY MASS INDEX DOCD: CPT | Mod: CPTII,S$GLB,, | Performed by: PHYSICIAN ASSISTANT

## 2018-08-02 PROCEDURE — 80076 HEPATIC FUNCTION PANEL: CPT

## 2018-08-02 PROCEDURE — 86359 T CELLS TOTAL COUNT: CPT

## 2018-08-02 PROCEDURE — 85025 COMPLETE CBC W/AUTO DIFF WBC: CPT

## 2018-08-02 RX ORDER — RIZATRIPTAN BENZOATE 10 MG/1
10 TABLET ORAL DAILY PRN
Qty: 15 TABLET | Refills: 1 | Status: SHIPPED | OUTPATIENT
Start: 2018-08-02 | End: 2018-11-10 | Stop reason: SDUPTHER

## 2018-08-02 NOTE — PROGRESS NOTES
Subjective:       Patient ID: Kasandra Garcia is a 48 y.o. female who presents today for a routine clinic visit for MS.  [facility] in February 2018 with Dr. Matthews    MS HPI:  · DMT: Tecfidera  · Side effects from DMT? No  · Taking vitamin D3 as recommended? Yes - alternating b/w 5,000 and 10,000IU/d   · Recently denied disability--appealed and recently submitted yesterday. Needs  Medicare  · She will be caring for her 3 mo old grandson when her daughter returns to school. She states she has other family members that can assist if needed.   · Requests refill on Maxalt today    SOCIAL HISTORY  Social History   Substance Use Topics    Smoking status: Never Smoker    Smokeless tobacco: Never Used    Alcohol use No     Living arrangements - the patient lives with their family.  Employment [employer] disability, does not qualify for SS disability.     MS ROS:  · Fatigue: Yes - no longer on a stimulant;    · Sleep Disturbance: Yes -  Much better;  Ambien HS  · Bladder Dysfunction: No - improved; no longer on ditropan--feels urinary issues were due to kidney stones  · Bowel Dysfunction: No  · Spasticity: Yes - No longer taking Baclofen and Tizanidine  · Visual Symptoms: Yes - age related reading;   · Cognitive: Yes - stable;  Not better not worse;   · Mood Disorder: Yes - stable; not depressed but states she has moments of depression when she can't do something   · Gait Disturbance: No  · Falls: No  · Hand Dysfunction: No  · Pain: Yes - from fibromyalgia; Lyrica 100mg BID, prescribed by Dr. Marie   · Sexual Dysfunction: Not Assessed  · Skin Breakdown: No  · Tremors: No  · Dysphagia:  No  · Dysarthria:  No  · Heat sensitivity:  No  · Any un-met adaptive needs? No  · Copay Assist?  Yes - $0  · Clinical Trial candidate? Yes, interested in EBV in MS study           Objective:        1. 25 foot timed walk:3.42s last visit ; 3.6s   Timed 25 Foot Walk: 2/9/2017 8/8/2017   Did patient wear an AFO? No  No   Was assistive device used? No No   Time for 25 Foot Walk (seconds) 3.8 3.4       Neurologic Exam     Mental Status   Oriented to person, place, and time.   Follows 3 step commands.   Speech: speech is normal   Level of consciousness: alert  Normal comprehension.     Cranial Nerves     CN II   Visual acuity: normal (20/20 Od and OS with Snellen hand held chart at 6 ft)    CN III, IV, VI   Pupils are equal, round, and reactive to light.  Extraocular motions are normal.     CN V   Facial sensation intact.     CN VII   Facial expression full, symmetric.     CN VIII   Hearing: intact (finger rub)    CN IX, X   Palate: symmetric    CN XI   CN XI normal.     CN XII   Tongue deviation: none    Motor Exam   Muscle bulk: normal  Overall muscle tone: normal    Strength   Right deltoid: 5/5  Left deltoid: 5/5  Right triceps: 5/5  Left triceps: 5/5  Right wrist extension: 5/5  Left wrist extension: 5/5  Right interossei: 5/5  Left interossei: 5/5  Right iliopsoas: 5/5  Left iliopsoas: 5/5  Right hamstrin/5  Left hamstrin/5  Right anterior tibial: 5/5  Left anterior tibial: 5/5  Right peroneal: 5/5  Left peroneal: 5/5    Sensory Exam   Right leg light touch: tingling in toes.  Left leg light touch: tingling in toes.  Right arm vibration: normal  Left arm vibration: normal  Right leg vibration: decreased from toes  Left leg vibration: decreased from toes    Gait, Coordination, and Reflexes     Gait  Gait: normal    Coordination   Finger-nose-finger test: slightly slowed on L UE.  Heel to shin coordination: normal  Tandem walking coordination: normal    Tremor   Resting tremor: absent  Action tremor: absent    Reflexes   Right brachioradialis: 2+  Left brachioradialis: 2+  Right biceps: 2+  Left biceps: 2+  Right triceps: 2+  Left triceps: 2+  Right patellar: 2+  Left patellar: 2+  Right achilles: 2+  Left achilles: 2+  Right plantar: equivocal  Left plantar: equivocal  Right ankle clonus: absent  Left ankle clonus:  absent  Right pendular knee jerk: absent  Left pendular knee jerk: absentPreponderance of reflexes on L LE as compared to R LE    Normal Heel/toe walk  Normal RSM  Difficult hopping on L LE as compared to R LE           Imaging:     Results for orders placed during the hospital encounter of 07/27/18   MRI Brain W WO Contrast    Impression Stable MRI of the brain.  White matter lesions consistent with demyelinating plaques of multiple sclerosis.  No significant interval change since 2016 MRI.      Electronically signed by: Aman Pastor MD  Date:    07/27/2018  Time:    16:42     Results for orders placed during the hospital encounter of 07/29/16   MRI Cervical Spine W WO Cont    Impression Unchanged patchy cord signal abnormality throughout cervical cord most in keeping with chronic multifocal demyelinating plaque in this patient with known history of multiple sclerosis.  No new lesions or evidence of active demyelination.        ______________________________________     Electronically signed by: MLEISSA ABEL MD  Date:     07/29/16  Time:    13:08      Results for orders placed in visit on 03/05/12   MRI Thoracic Spine W WO Cont       Labs:     Lab Results   Component Value Date    NKCXFBJF13UT 51 02/06/2018    SGQIHRRT61QH 58 02/09/2017    GQQOZKXF41PM 50 01/11/2016     No results found for: JCVINDEX, JCVANTIBODY  Lab Results   Component Value Date    UD5PLONA 73.6 02/06/2018    ABSOLUTECD3 1029 02/06/2018    TH2ATZCV 13.9 02/06/2018    ABSOLUTECD8 195 (L) 02/06/2018    GL2EHDEW 59.6 (H) 02/06/2018    ABSOLUTECD4 833 02/06/2018    LABCD48 4.28 (H) 02/06/2018     Lab Results   Component Value Date    WBC 5.75 08/02/2018    RBC 4.30 08/02/2018    HGB 13.4 08/02/2018    HCT 40.0 08/02/2018    MCV 93 08/02/2018    MCH 31.2 (H) 08/02/2018    MCHC 33.5 08/02/2018    RDW 12.6 08/02/2018     (H) 08/02/2018    MPV 9.6 08/02/2018    GRAN 3.5 08/02/2018    GRAN 61.6 08/02/2018    LYMPH 1.6 08/02/2018    LYMPH  27.1 08/02/2018    MONO 0.5 08/02/2018    MONO 8.0 08/02/2018    EOS 0.1 08/02/2018    BASO 0.04 08/02/2018    EOSINOPHIL 2.3 08/02/2018    BASOPHIL 0.7 08/02/2018     Sodium   Date Value Ref Range Status   01/22/2014 141 136 - 145 mmol/L Final     Potassium   Date Value Ref Range Status   01/22/2014 3.9 3.5 - 5.1 mmol/L Final     Chloride   Date Value Ref Range Status   01/22/2014 104 95 - 110 mmol/L Final     CO2   Date Value Ref Range Status   01/22/2014 28 23 - 29 mmol/L Final     Glucose   Date Value Ref Range Status   01/22/2014 111 (H) 70 - 110 mg/dL Final     BUN, Bld   Date Value Ref Range Status   01/22/2014 14 6 - 20 mg/dL Final     Creatinine   Date Value Ref Range Status   01/22/2014 0.9 0.5 - 1.4 mg/dL Final     Calcium   Date Value Ref Range Status   01/22/2014 9.4 8.7 - 10.5 mg/dL Final     Total Protein   Date Value Ref Range Status   08/02/2018 7.5 6.0 - 8.4 g/dL Final     Albumin   Date Value Ref Range Status   08/02/2018 4.2 3.5 - 5.2 g/dL Final     Total Bilirubin   Date Value Ref Range Status   08/02/2018 0.5 0.1 - 1.0 mg/dL Final     Comment:     For infants and newborns, interpretation of results should be based  on gestational age, weight and in agreement with clinical  observations.  Premature Infant recommended reference ranges:  Up to 24 hours.............<8.0 mg/dL  Up to 48 hours............<12.0 mg/dL  3-5 days..................<15.0 mg/dL  6-29 days.................<15.0 mg/dL       Alkaline Phosphatase   Date Value Ref Range Status   08/02/2018 78 55 - 135 U/L Final     AST   Date Value Ref Range Status   08/02/2018 14 10 - 40 U/L Final     ALT   Date Value Ref Range Status   08/02/2018 13 10 - 44 U/L Final     Anion Gap   Date Value Ref Range Status   01/22/2014 9 8 - 16 mmol/L Final     eGFR if    Date Value Ref Range Status   01/22/2014 >60.0 >60 mL/min/1.73 m^2 Final     eGFR if non    Date Value Ref Range Status   01/22/2014 >60.0 >60 mL/min/1.73  m^2 Final     Comment:     Calculation used to obtain the estimated glomerular filtration  rate (eGFR) is the CKD-EPI equation. Since race is unknown   in our information system, the eGFR values for   -American and Non--American patients are given   for each creatinine result.     Diagnosis/Assessment/Plan:    1. Multiple Sclerosis  · Assessment: Patient is clinically and radiographically stable on Tecfidera  · Imaging: MRI  stable as noted above and 3 Valium worked fairly well. Will plan for MRI of Brain and C-spine in July 2019. Reviewed images with patient today in clinic  · Disease Modifying Therapies: Continue Tecfidera and high dose Vit D3. Recent CBC done by PCP shows ALC of 1200, will check CD8 today.     2. MS Symptom Assessment / Management  · Spasticity: no longer taking Baclofen or Tizanidine-have removed from med list today  · Pain: fibromyalgia managed by Dr. Marcos(Lyrica)  · Clinical Trial: patient has met with [facility] Peraza today to discuss-unable to get serum donation today due to lab constraints. She will review protocol and consider for next visit.     Over 50% of this 40 minute visit was spent in direct face to face counseling of the patient about MS, DMT considerations, and MS symptom management.   Follow-up in about 6 months (around 2/2/2019) for follow up with me.  Patient agreed to POC today.    Attending, Dr. Matthews, was available during today's encounter. Any change to plan along with cosign to appear in the EMR.     Shakira West PA-C  [facility]      Multiple sclerosis  -     CBC auto differential; Future; Expected date: 08/02/2018  -     Warren-Suppressor Ratio; Future; Expected date: 08/02/2018  -     Hepatic function panel; Future    Counseling regarding goals of care    Encounter for long-term (current) use of high-risk medication  -     CBC auto differential; Future; Expected date: 08/02/2018  -     Warren-Suppressor Ratio; Future; Expected date: 08/02/2018  -      Hepatic function panel; Future    Migraine with aura and with status migrainosus, not intractable  -     rizatriptan (MAXALT) 10 MG tablet; Take 1 tablet (10 mg total) by mouth daily as needed.  Dispense: 15 tablet; Refill: 1

## 2018-08-03 LAB
ABSOLUTE CD3: 1074 CELLS/UL (ref 700–2100)
ABSOLUTE CD8: 213 CELLS/UL (ref 200–900)
CD3%: 71.7 % (ref 55–83)
CD3+CD4+ CELLS # BLD: 852 CELLS/UL (ref 300–1400)
CD3+CD4+ CELLS NFR BLD: 56.9 % (ref 28–57)
CD4/CD8 RATIO: 4 (ref 0.9–3.6)
CD8 % SUPPRESSOR T CELL: 14.2 % (ref 10–39)

## 2018-08-14 DIAGNOSIS — F51.01 PRIMARY INSOMNIA: ICD-10-CM

## 2018-08-14 RX ORDER — ZOLPIDEM TARTRATE 5 MG/1
TABLET ORAL
Qty: 90 TABLET | Refills: 1 | Status: SHIPPED | OUTPATIENT
Start: 2018-08-14 | End: 2019-02-15 | Stop reason: SDUPTHER

## 2018-09-14 ENCOUNTER — TELEPHONE (OUTPATIENT)
Dept: NEUROLOGY | Facility: CLINIC | Age: 48
End: 2018-09-14

## 2018-09-14 NOTE — TELEPHONE ENCOUNTER
----- Message from Natasha Batres sent at 9/14/2018  2:03 PM CDT -----  Contact: pt @ [phone]  Calling to speak with someone in Dr. West's office regarding getting her appt change to the same day (12/17) as her mothers. Please call.

## 2018-09-24 DIAGNOSIS — G35 MULTIPLE SCLEROSIS: ICD-10-CM

## 2018-09-25 RX ORDER — DIMETHYL FUMARATE 240 MG/1
CAPSULE ORAL
Qty: 180 CAPSULE | Refills: 1 | Status: SHIPPED | OUTPATIENT
Start: 2018-09-25 | End: 2019-04-30 | Stop reason: SDUPTHER

## 2018-11-10 DIAGNOSIS — G43.101 MIGRAINE WITH AURA AND WITH STATUS MIGRAINOSUS, NOT INTRACTABLE: ICD-10-CM

## 2018-11-12 RX ORDER — RIZATRIPTAN BENZOATE 10 MG/1
TABLET ORAL
Qty: 15 TABLET | Refills: 1 | Status: SHIPPED | OUTPATIENT
Start: 2018-11-12 | End: 2019-02-18 | Stop reason: SDUPTHER

## 2019-01-23 ENCOUNTER — DOCUMENTATION ONLY (OUTPATIENT)
Dept: NEUROLOGY | Facility: CLINIC | Age: 49
End: 2019-01-23

## 2019-01-25 ENCOUNTER — TELEPHONE (OUTPATIENT)
Dept: PSYCHIATRY | Facility: CLINIC | Age: 49
End: 2019-01-25

## 2019-01-25 NOTE — TELEPHONE ENCOUNTER
Received faxed request for medical records from [facility] [employer] in [place].  Forwarded this request to [facility] by fax [phone].  Phoned Smiley Yepez at [facility] [phone] and let her know that request was forwarded.

## 2019-02-15 ENCOUNTER — LAB VISIT (OUTPATIENT)
Dept: LAB | Facility: HOSPITAL | Age: 49
End: 2019-02-15
Payer: COMMERCIAL

## 2019-02-15 ENCOUNTER — OFFICE VISIT (OUTPATIENT)
Dept: NEUROLOGY | Facility: CLINIC | Age: 49
End: 2019-02-15
Payer: COMMERCIAL

## 2019-02-15 VITALS
HEIGHT: 66 IN | HEART RATE: 84 BPM | BODY MASS INDEX: 27.97 KG/M2 | DIASTOLIC BLOOD PRESSURE: 74 MMHG | WEIGHT: 174 LBS | SYSTOLIC BLOOD PRESSURE: 113 MMHG

## 2019-02-15 DIAGNOSIS — Z79.899 ENCOUNTER FOR LONG-TERM (CURRENT) USE OF HIGH-RISK MEDICATION: ICD-10-CM

## 2019-02-15 DIAGNOSIS — M79.7 FIBROMYALGIA: ICD-10-CM

## 2019-02-15 DIAGNOSIS — E55.9 VITAMIN D INSUFFICIENCY: ICD-10-CM

## 2019-02-15 DIAGNOSIS — Z99.89 CPAP (CONTINUOUS POSITIVE AIRWAY PRESSURE) DEPENDENCE: ICD-10-CM

## 2019-02-15 DIAGNOSIS — G35 MULTIPLE SCLEROSIS: Primary | ICD-10-CM

## 2019-02-15 DIAGNOSIS — Z71.89 COUNSELING REGARDING GOALS OF CARE: ICD-10-CM

## 2019-02-15 DIAGNOSIS — G35 MULTIPLE SCLEROSIS: ICD-10-CM

## 2019-02-15 DIAGNOSIS — F51.01 PRIMARY INSOMNIA: ICD-10-CM

## 2019-02-15 LAB
25(OH)D3+25(OH)D2 SERPL-MCNC: 56 NG/ML
ABSOLUTE CD3: 1348 CELLS/UL (ref 700–2100)
ABSOLUTE CD8: 312 CELLS/UL (ref 200–900)
ALBUMIN SERPL BCP-MCNC: 4.2 G/DL
ALP SERPL-CCNC: 75 U/L
ALT SERPL W/O P-5'-P-CCNC: 19 U/L
AST SERPL-CCNC: 21 U/L
BASOPHILS # BLD AUTO: 0.04 K/UL
BASOPHILS NFR BLD: 0.7 %
BILIRUB DIRECT SERPL-MCNC: 0.2 MG/DL
BILIRUB SERPL-MCNC: 0.6 MG/DL
CD3%: 75 % (ref 55–83)
CD3+CD4+ CELLS # BLD: 1040 CELLS/UL (ref 300–1400)
CD3+CD4+ CELLS NFR BLD: 57.9 % (ref 28–57)
CD4/CD8 RATIO: 3.34 (ref 0.9–3.6)
CD8 % SUPPRESSOR T CELL: 17.4 % (ref 10–39)
DIFFERENTIAL METHOD: NORMAL
EOSINOPHIL # BLD AUTO: 0.1 K/UL
EOSINOPHIL NFR BLD: 1.7 %
ERYTHROCYTE [DISTWIDTH] IN BLOOD BY AUTOMATED COUNT: 12.6 %
HCT VFR BLD AUTO: 43.5 %
HGB BLD-MCNC: 13.9 G/DL
IMM GRANULOCYTES # BLD AUTO: 0.02 K/UL
IMM GRANULOCYTES NFR BLD AUTO: 0.4 %
LYMPHOCYTES # BLD AUTO: 1.7 K/UL
LYMPHOCYTES NFR BLD: 30.8 %
MCH RBC QN AUTO: 30.3 PG
MCHC RBC AUTO-ENTMCNC: 32 G/DL
MCV RBC AUTO: 95 FL
MONOCYTES # BLD AUTO: 0.4 K/UL
MONOCYTES NFR BLD: 7.4 %
NEUTROPHILS # BLD AUTO: 3.2 K/UL
NEUTROPHILS NFR BLD: 59 %
NRBC BLD-RTO: 0 /100 WBC
PLATELET # BLD AUTO: 340 K/UL
PMV BLD AUTO: 9.4 FL
PROT SERPL-MCNC: 7.5 G/DL
RBC # BLD AUTO: 4.59 M/UL
WBC # BLD AUTO: 5.42 K/UL

## 2019-02-15 PROCEDURE — 3008F BODY MASS INDEX DOCD: CPT | Mod: CPTII,S$GLB,, | Performed by: PHYSICIAN ASSISTANT

## 2019-02-15 PROCEDURE — 99999 PR PBB SHADOW E&M-EST. PATIENT-LVL III: ICD-10-PCS | Mod: PBBFAC,,, | Performed by: PHYSICIAN ASSISTANT

## 2019-02-15 PROCEDURE — 82306 VITAMIN D 25 HYDROXY: CPT

## 2019-02-15 PROCEDURE — 36415 COLL VENOUS BLD VENIPUNCTURE: CPT

## 2019-02-15 PROCEDURE — 99215 PR OFFICE/OUTPT VISIT, EST, LEVL V, 40-54 MIN: ICD-10-PCS | Mod: S$GLB,,, | Performed by: PHYSICIAN ASSISTANT

## 2019-02-15 PROCEDURE — 99215 OFFICE O/P EST HI 40 MIN: CPT | Mod: S$GLB,,, | Performed by: PHYSICIAN ASSISTANT

## 2019-02-15 PROCEDURE — 99999 PR PBB SHADOW E&M-EST. PATIENT-LVL III: CPT | Mod: PBBFAC,,, | Performed by: PHYSICIAN ASSISTANT

## 2019-02-15 PROCEDURE — 86360 T CELL ABSOLUTE COUNT/RATIO: CPT

## 2019-02-15 PROCEDURE — 86359 T CELLS TOTAL COUNT: CPT

## 2019-02-15 PROCEDURE — 85025 COMPLETE CBC W/AUTO DIFF WBC: CPT

## 2019-02-15 PROCEDURE — 3008F PR BODY MASS INDEX (BMI) DOCUMENTED: ICD-10-PCS | Mod: CPTII,S$GLB,, | Performed by: PHYSICIAN ASSISTANT

## 2019-02-15 PROCEDURE — 80076 HEPATIC FUNCTION PANEL: CPT

## 2019-02-15 RX ORDER — ZOLPIDEM TARTRATE 5 MG/1
TABLET ORAL
Qty: 90 TABLET | Refills: 1 | Status: SHIPPED | OUTPATIENT
Start: 2019-02-15 | End: 2019-07-30 | Stop reason: SDUPTHER

## 2019-02-16 ENCOUNTER — PATIENT MESSAGE (OUTPATIENT)
Dept: NEUROLOGY | Facility: CLINIC | Age: 49
End: 2019-02-16

## 2019-02-16 DIAGNOSIS — G43.101 MIGRAINE WITH AURA AND WITH STATUS MIGRAINOSUS, NOT INTRACTABLE: ICD-10-CM

## 2019-02-16 DIAGNOSIS — G35 MULTIPLE SCLEROSIS: ICD-10-CM

## 2019-02-16 DIAGNOSIS — M79.2 NEUROPATHIC PAIN: Primary | ICD-10-CM

## 2019-02-18 RX ORDER — RIZATRIPTAN BENZOATE 10 MG/1
10 TABLET ORAL DAILY PRN
Qty: 15 TABLET | Refills: 1 | Status: SHIPPED | OUTPATIENT
Start: 2019-02-18 | End: 2019-05-16 | Stop reason: SDUPTHER

## 2019-02-18 RX ORDER — DULOXETIN HYDROCHLORIDE 30 MG/1
30 CAPSULE, DELAYED RELEASE ORAL DAILY
Qty: 90 CAPSULE | Refills: 1 | OUTPATIENT
Start: 2019-02-18 | End: 2020-02-18

## 2019-02-18 RX ORDER — GABAPENTIN 300 MG/1
300 CAPSULE ORAL 3 TIMES DAILY
Qty: 270 CAPSULE | Refills: 1 | OUTPATIENT
Start: 2019-02-18 | End: 2020-02-18

## 2019-02-26 ENCOUNTER — PATIENT MESSAGE (OUTPATIENT)
Dept: NEUROLOGY | Facility: CLINIC | Age: 49
End: 2019-02-26

## 2019-02-26 DIAGNOSIS — G35 MULTIPLE SCLEROSIS: Primary | ICD-10-CM

## 2019-02-26 DIAGNOSIS — M62.830 PARASPINAL MUSCLE SPASM: ICD-10-CM

## 2019-03-19 ENCOUNTER — PATIENT MESSAGE (OUTPATIENT)
Dept: NEUROLOGY | Facility: CLINIC | Age: 49
End: 2019-03-19

## 2019-03-22 ENCOUNTER — LAB VISIT (OUTPATIENT)
Dept: LAB | Facility: HOSPITAL | Age: 49
End: 2019-03-22
Payer: COMMERCIAL

## 2019-03-22 ENCOUNTER — OFFICE VISIT (OUTPATIENT)
Dept: NEUROLOGY | Facility: CLINIC | Age: 49
End: 2019-03-22
Payer: COMMERCIAL

## 2019-03-22 ENCOUNTER — PATIENT MESSAGE (OUTPATIENT)
Dept: NEUROLOGY | Facility: CLINIC | Age: 49
End: 2019-03-22

## 2019-03-22 VITALS
SYSTOLIC BLOOD PRESSURE: 117 MMHG | HEIGHT: 66 IN | DIASTOLIC BLOOD PRESSURE: 75 MMHG | HEART RATE: 87 BPM | WEIGHT: 176.69 LBS | BODY MASS INDEX: 28.4 KG/M2

## 2019-03-22 DIAGNOSIS — R44.9 SENSORY DEFICIT PRESENT: ICD-10-CM

## 2019-03-22 DIAGNOSIS — G35 MULTIPLE SCLEROSIS: Primary | ICD-10-CM

## 2019-03-22 DIAGNOSIS — Z71.89 COUNSELING REGARDING GOALS OF CARE: ICD-10-CM

## 2019-03-22 DIAGNOSIS — G35 MULTIPLE SCLEROSIS: ICD-10-CM

## 2019-03-22 DIAGNOSIS — M62.81 LEFT-SIDED MUSCLE WEAKNESS: ICD-10-CM

## 2019-03-22 LAB
ALBUMIN SERPL BCP-MCNC: 4.1 G/DL
ALP SERPL-CCNC: 93 U/L
ALT SERPL W/O P-5'-P-CCNC: 28 U/L
ANION GAP SERPL CALC-SCNC: 7 MMOL/L
AST SERPL-CCNC: 25 U/L
BASOPHILS # BLD AUTO: 0.03 K/UL
BASOPHILS NFR BLD: 0.5 %
BILIRUB SERPL-MCNC: 0.3 MG/DL
BUN SERPL-MCNC: 15 MG/DL
CALCIUM SERPL-MCNC: 9.7 MG/DL
CHLORIDE SERPL-SCNC: 103 MMOL/L
CO2 SERPL-SCNC: 29 MMOL/L
CREAT SERPL-MCNC: 0.8 MG/DL
DIFFERENTIAL METHOD: ABNORMAL
EOSINOPHIL # BLD AUTO: 0.1 K/UL
EOSINOPHIL NFR BLD: 1.4 %
ERYTHROCYTE [DISTWIDTH] IN BLOOD BY AUTOMATED COUNT: 12.6 %
EST. GFR  (AFRICAN AMERICAN): >60 ML/MIN/1.73 M^2
EST. GFR  (NON AFRICAN AMERICAN): >60 ML/MIN/1.73 M^2
GLUCOSE SERPL-MCNC: 94 MG/DL
HCT VFR BLD AUTO: 44.9 %
HGB BLD-MCNC: 14.6 G/DL
IMM GRANULOCYTES # BLD AUTO: 0.02 K/UL
IMM GRANULOCYTES NFR BLD AUTO: 0.3 %
LYMPHOCYTES # BLD AUTO: 1.6 K/UL
LYMPHOCYTES NFR BLD: 27.4 %
MCH RBC QN AUTO: 30.5 PG
MCHC RBC AUTO-ENTMCNC: 32.5 G/DL
MCV RBC AUTO: 94 FL
MONOCYTES # BLD AUTO: 0.4 K/UL
MONOCYTES NFR BLD: 7.5 %
NEUTROPHILS # BLD AUTO: 3.7 K/UL
NEUTROPHILS NFR BLD: 62.9 %
NRBC BLD-RTO: 0 /100 WBC
PLATELET # BLD AUTO: 389 K/UL
PMV BLD AUTO: 9.2 FL
POTASSIUM SERPL-SCNC: 4.5 MMOL/L
PROT SERPL-MCNC: 7.5 G/DL
RBC # BLD AUTO: 4.79 M/UL
SODIUM SERPL-SCNC: 139 MMOL/L
WBC # BLD AUTO: 5.84 K/UL

## 2019-03-22 PROCEDURE — 3008F BODY MASS INDEX DOCD: CPT | Mod: CPTII,S$GLB,, | Performed by: PHYSICIAN ASSISTANT

## 2019-03-22 PROCEDURE — 99999 PR PBB SHADOW E&M-EST. PATIENT-LVL III: ICD-10-PCS | Mod: PBBFAC,,, | Performed by: PHYSICIAN ASSISTANT

## 2019-03-22 PROCEDURE — 99214 PR OFFICE/OUTPT VISIT, EST, LEVL IV, 30-39 MIN: ICD-10-PCS | Mod: S$GLB,,, | Performed by: PHYSICIAN ASSISTANT

## 2019-03-22 PROCEDURE — 99999 PR PBB SHADOW E&M-EST. PATIENT-LVL III: CPT | Mod: PBBFAC,,, | Performed by: PHYSICIAN ASSISTANT

## 2019-03-22 PROCEDURE — 80053 COMPREHEN METABOLIC PANEL: CPT

## 2019-03-22 PROCEDURE — 85025 COMPLETE CBC W/AUTO DIFF WBC: CPT

## 2019-03-22 PROCEDURE — 3008F PR BODY MASS INDEX (BMI) DOCUMENTED: ICD-10-PCS | Mod: CPTII,S$GLB,, | Performed by: PHYSICIAN ASSISTANT

## 2019-03-22 PROCEDURE — 36415 COLL VENOUS BLD VENIPUNCTURE: CPT

## 2019-03-22 PROCEDURE — 99214 OFFICE O/P EST MOD 30 MIN: CPT | Mod: S$GLB,,, | Performed by: PHYSICIAN ASSISTANT

## 2019-03-22 RX ORDER — DULOXETIN HYDROCHLORIDE 30 MG/1
30 CAPSULE, DELAYED RELEASE ORAL DAILY
COMMUNITY
End: 2020-04-16

## 2019-03-22 RX ORDER — PREGABALIN 100 MG/1
100 CAPSULE ORAL 2 TIMES DAILY
COMMUNITY
End: 2023-07-25 | Stop reason: SDUPTHER

## 2019-03-22 RX ORDER — DULOXETIN HYDROCHLORIDE 20 MG/1
20 CAPSULE, DELAYED RELEASE ORAL DAILY
COMMUNITY
End: 2019-03-22 | Stop reason: CLARIF

## 2019-03-22 NOTE — PROGRESS NOTES
"Subjective:       Patient ID: Kasandra Garcia is a 48 y.o. female who presents today for a fit-in clinic visit for MS.      MS HPI:  · DMT: Tecfidera--very consistent with dosing  · Side effects from DMT? No  · Taking vitamin D3 as recommended? Yes      Patient states that since our last visit in February she has experienced extreme fatigue that started about a week after our visit and about 2 weeks ago she began experiencing  L facial, and L sided UE and LE. Feelings are constant. The sensory symptoms are not new-these were her initial symptoms. She states she is sleeping well, but remains exhausted.   Patient portal message:   "fatigue is constant and the other symptoms are also constant; however, they do seem to be better at times and worse at times. The pain in my face  almost feels like sinus pressure hurting from the left side of my nose to my ear but I have no nasal congestion or any symptoms of any other infection and the skin on my face is numb and tingly on the left side.  I am not sleeping well but I am sleeping but I wake up almost as tired as I was when I went to sleep. I am still taking the ambience 5mg nightly."     Denies bladder/bowel symptoms,   Started Cymbalta just prior to our last visit in February(couple weeks)    SOCIAL HISTORY  Social History     Tobacco Use    Smoking status: Never Smoker    Smokeless tobacco: Never Used   Substance Use Topics    Alcohol use: No     Alcohol/week: 0.0 oz    Drug use: No     Living arrangements - the patient .  Employment     MS ROS:  As above        Objective:        1. 25 foot timed walk: 4.1s today; 3.7s last visit; 3.6s last visit; 3.42 previous visit  Timed 25 Foot Walk: 2/9/2017 8/8/2017   Did patient wear an AFO? No No   Was assistive device used? No No   Time for 25 Foot Walk (seconds) 3.8 3.4       Neurologic Exam     Mental Status   Oriented to person, place, and time.   Follows 3 step commands.   Speech: speech is normal   Level of " consciousness: alert  Normal comprehension.     Cranial Nerves     CN II   Visual acuity: normal (20/20 Od and OS with Snellen hand held chart at 6 ft)    CN III, IV, VI   Pupils are equal, round, and reactive to light.  Extraocular motions are normal.     CN V   Facial sensation intact.     CN VII   Facial expression full, symmetric.     CN VIII   Hearing: intact (finger rub)    CN IX, X   Palate: symmetric    CN XI   CN XI normal.     CN XII   Tongue deviation: none    Motor Exam   Muscle bulk: normal  Overall muscle tone: normal    Strength   Right deltoid: 5/5  Left deltoid: 5/5  Right triceps: 5/5  Left triceps: 5/5  Right wrist extension: 5/5  Left wrist extension: 5/5  Right interossei: 5/5  Left interossei: 5/5  Right iliopsoas: 5/5  Left iliopsoas: 5/5  Right hamstrin/5  Left hamstrin/5  Right anterior tibial: 5/5  Left anterior tibial: 5/5  Right peroneal: 5/5  Left peroneal: 5/5L HF 5-/5     Sensory Exam   Right arm light touch: normal  Left arm light touch: diminished as compared to R.  Right leg light touch: tingling in toes.  Left leg light touch: tingling in toes.  Right arm vibration: normal  Left arm vibration: normal  Right leg vibration: decreased from toes  Left leg vibration: decreased from toes    Decreased LT approx. T6-7 distal  Pinprick impaired bilateral LE's and diminished on LUE as compared to R       Gait, Coordination, and Reflexes     Gait  Gait: normal    Coordination   Finger-nose-finger test: slightly slowed on L UE.  Heel to shin coordination: normal  Tandem walking coordination: normal    Tremor   Resting tremor: absent  Action tremor: absent    Reflexes   Right brachioradialis: 2+  Left brachioradialis: 2+  Right biceps: 2+  Left biceps: 2+  Right triceps: 2+  Left triceps: 2+  Right patellar: 2+  Left patellar: 2+  Right achilles: 2+  Left achilles: 2+  Right plantar: equivocal  Left plantar: equivocal  Right ankle clonus: absent  Left ankle clonus: absent  Right  pendular knee jerk: absent  Left pendular knee jerk: absentPreponderance of reflexes on L LE as compared to R LE    Normal Heel/toe walk  Normal RSM  Unable to hop on L LE today, intact on R LE             Imaging:     Results for orders placed during the hospital encounter of 07/27/18   MRI Brain W WO Contrast    Narrative EXAMINATION:  MRI BRAIN W WO CONTRAST    CLINICAL HISTORY:  Multiple sclerosisMultiple sclerosis, new neurological event;    TECHNIQUE:  Standard multiplanar noncontrast and contrast enhanced sequences of the brain performed with 7 cc Gadavist.    COMPARISON:  Seven hundred twenty-nine tooth 16    FINDINGS:  The corpus callosum is intact.  The ventricles are nondilated.    There are several periventricular white-matter lesions with orientations perpendicular to the ventral margin.  Several scattered lesions are noted 1 within the left middle cerebellar peduncle.  Additionally several lesions display hypointense T1 signal.  None display significant contrast enhancement or mass effect.    No associated restricted diffusion.  The overall appearance is similar to the previous exam.    No additional findings.      Impression Stable MRI of the brain.  White matter lesions consistent with demyelinating plaques of multiple sclerosis.  No significant interval change since 2016 MRI.      Electronically signed by: Aman Pastor MD  Date:    07/27/2018  Time:    16:42     Results for orders placed during the hospital encounter of 07/29/16   MRI Cervical Spine W WO Cont    Narrative Technique: Multiplanar, multisequence MRI of the cervical spine was performed prior to and following administration of 9 cc IV Gadavist.    Comparison: 2/4/2013    Findings:    There is a large amount of susceptibility artifact related to multilevel postoperative changes in the cervical spine.  Adjacent anatomy is distorted with subsequent decrease in exam sensitivity and specificity.  Interpretation is offered within the  confines.    Again demonstrated is subtle patchy signal abnormality within the cervical cord extending from C2-C3 as well as at the cervicothoracic junction.  Cord caliber appears normal with no definite edema demonstrated.  No new lesions identified.  No abnormal enhancement to suggest active demyelination.    Limited evaluation of the neck soft tissues is unremarkable.    No definite spinal canal or neuroforaminal stenosis appreciated noting substantial limitations due to artifact.    Impression Unchanged patchy cord signal abnormality throughout cervical cord most in keeping with chronic multifocal demyelinating plaque in this patient with known history of multiple sclerosis.  No new lesions or evidence of active demyelination.        ______________________________________     Electronically signed by: MELISSA ABEL MD  Date:     07/29/16  Time:    13:08      Results for orders placed in visit on 03/05/12   MRI Thoracic Spine W WO Cont    Narrative DATE OF EXAM: Mar 27 2012      [facility]   0252  -  MRI T-SPINE W/O & W CONTRAST:   \  [id number]     CLINICAL HISTORY:   \340 0 MULTIPLE SCLEROSIS     PROCEDURE COMMENT:   \     ICD 9 CODE(S):   (\)     CPT 4 CODE(S)/MODIFIER(S):   (\)     Comparison: 03/26/10     Findings:     Usual sequences performed including post contrasted T1.  14 cc   intravenous Omniscan utilized.     Skin markers in place the midline upper and lower back at the T5 and T11   -- 12 levels.  Vertebral body height , disc spaces and alignment are well   maintained.  No acute fracture or subluxation.  Marrow signal is within   normal limits.  Postsurgical changes noted partially visualized C-spine   with prominent artifact.  Stable smooth tapering of the cord noted along   the posterior margin T4.  Multi-level focal areas of intermediate   increased signal identified throughout the cord consistent with   demyelinating plaques this patient with known multiple sclerosis.    Contiguous axial images obtained  on present exam improved overall   visualization.  Postcontrast T1 axial sequence with fat saturation   suggestive multilevel areas of enhancement throughout the thoracic cord.    Please correlate this clinically.        Impression:       Abnormal exam again demonstrating multilevel demyelinating plaques   consistent with patient's known multiple sclerosis.  Patchy areas of   multilevel enhancing plaques noted.  Please correlate clinically.  See   detailed discussion above.   ______________________________________      Electronically signed by: Shahriar Pickard III, MD  Date:     03/30/12  Time:    13:43            : TENA  Transcribe Date/Time: Mar 30 2012  1:43P  Dictated by : SHAHRIAR PICKARD III, MD  Report reviewed by:   Read On:   \  Images were reviewed, findings were verified and document was   electronically  SIGNED BY: SHAHRIAR PICKARD III, MD On: Mar 30 2012  1:43P            Labs:     Lab Results   Component Value Date    BSGCBJIJ58WX 56 02/15/2019    MMTNCZNQ77LN 51 02/06/2018    MBKUVUFT04GJ 58 02/09/2017     No results found for: JCVINDEX, JCVANTIBODY  Lab Results   Component Value Date    XX4OMQDX 75.0 02/15/2019    ABSOLUTECD3 1348 02/15/2019    FE7GYETF 17.4 02/15/2019    ABSOLUTECD8 312 02/15/2019    PB0HECZO 57.9 (H) 02/15/2019    ABSOLUTECD4 1040 02/15/2019    LABCD48 3.34 02/15/2019     Lab Results   Component Value Date    WBC 5.84 03/22/2019    RBC 4.79 03/22/2019    HGB 14.6 03/22/2019    HCT 44.9 03/22/2019    MCV 94 03/22/2019    MCH 30.5 03/22/2019    MCHC 32.5 03/22/2019    RDW 12.6 03/22/2019     (H) 03/22/2019    MPV 9.2 03/22/2019    GRAN 3.7 03/22/2019    GRAN 62.9 03/22/2019    LYMPH 1.6 03/22/2019    LYMPH 27.4 03/22/2019    MONO 0.4 03/22/2019    MONO 7.5 03/22/2019    EOS 0.1 03/22/2019    BASO 0.03 03/22/2019    EOSINOPHIL 1.4 03/22/2019    BASOPHIL 0.5 03/22/2019     Sodium   Date Value Ref Range Status   03/22/2019 139 136 - 145 mmol/L Final     Potassium    Date Value Ref Range Status   03/22/2019 4.5 3.5 - 5.1 mmol/L Final     Chloride   Date Value Ref Range Status   03/22/2019 103 95 - 110 mmol/L Final     CO2   Date Value Ref Range Status   03/22/2019 29 23 - 29 mmol/L Final     Glucose   Date Value Ref Range Status   03/22/2019 94 70 - 110 mg/dL Final     BUN, Bld   Date Value Ref Range Status   03/22/2019 15 6 - 20 mg/dL Final     Creatinine   Date Value Ref Range Status   03/22/2019 0.8 0.5 - 1.4 mg/dL Final     Calcium   Date Value Ref Range Status   03/22/2019 9.7 8.7 - 10.5 mg/dL Final     Total Protein   Date Value Ref Range Status   03/22/2019 7.5 6.0 - 8.4 g/dL Final     Albumin   Date Value Ref Range Status   03/22/2019 4.1 3.5 - 5.2 g/dL Final     Total Bilirubin   Date Value Ref Range Status   03/22/2019 0.3 0.1 - 1.0 mg/dL Final     Comment:     For infants and newborns, interpretation of results should be based  on gestational age, weight and in agreement with clinical  observations.  Premature Infant recommended reference ranges:  Up to 24 hours.............<8.0 mg/dL  Up to 48 hours............<12.0 mg/dL  3-5 days..................<15.0 mg/dL  6-29 days.................<15.0 mg/dL       Alkaline Phosphatase   Date Value Ref Range Status   03/22/2019 93 55 - 135 U/L Final     AST   Date Value Ref Range Status   03/22/2019 25 10 - 40 U/L Final     ALT   Date Value Ref Range Status   03/22/2019 28 10 - 44 U/L Final     Anion Gap   Date Value Ref Range Status   03/22/2019 7 (L) 8 - 16 mmol/L Final     eGFR if    Date Value Ref Range Status   03/22/2019 >60.0 >60 mL/min/1.73 m^2 Final     eGFR if non    Date Value Ref Range Status   03/22/2019 >60.0 >60 mL/min/1.73 m^2 Final     Comment:     Calculation used to obtain the estimated glomerular filtration  rate (eGFR) is the CKD-EPI equation.          Diagnosis/Assessment/Plan:    1. Multiple Sclerosis  · Assessment: Patient presents for fit in appt. For worsening fatigue  and increased sensory symptoms. Her timed walk is slightly slower today and I do appreciate a weakness in L HF(unable to hop on L LE today), and a very mild weakness in L Deltoid. Will assess for infection(CBC/CMP/UA-culture), if negative will proceed with imaging   · Imaging:to be considered if labs negative.   · Disease Modifying Therapies: continue with Tecfidera for now, will perhaps need to reconsider after results of testing.      Over 50% of this 35 minute visit was spent in direct face to face counseling of the patient about MS, DMT considerations, and MS symptom management.     Will follow up via portal/phone with results and plan  Patient agreed to POC today.    Attending, Dr. Matthews, was available during today's encounter.     Shakira West PA-C  [facility]    Problem List Items Addressed This Visit        Neuro    Multiple sclerosis - Primary (Chronic)    Overview     Stable on Tecfidera(RRMS)         Relevant Orders    CBC auto differential (Completed)    Comprehensive metabolic panel (Completed)    Urinalysis (Completed)    Urine culture       Other    Counseling regarding goals of care      Other Visit Diagnoses     Sensory deficit present        Left-sided muscle weakness

## 2019-04-10 ENCOUNTER — PATIENT MESSAGE (OUTPATIENT)
Dept: NEUROLOGY | Facility: CLINIC | Age: 49
End: 2019-04-10

## 2019-04-10 RX ORDER — DIAZEPAM 5 MG/1
TABLET ORAL
Qty: 3 TABLET | Refills: 0 | Status: SHIPPED | OUTPATIENT
Start: 2019-04-10 | End: 2020-04-16

## 2019-04-12 ENCOUNTER — HOSPITAL ENCOUNTER (OUTPATIENT)
Dept: RADIOLOGY | Facility: HOSPITAL | Age: 49
Discharge: HOME | End: 2019-04-12
Attending: PHYSICIAN ASSISTANT
Payer: COMMERCIAL

## 2019-04-12 DIAGNOSIS — G35 MULTIPLE SCLEROSIS: ICD-10-CM

## 2019-04-12 PROCEDURE — 72156 MRI NECK SPINE W/O & W/DYE: CPT | Mod: TC

## 2019-04-12 PROCEDURE — 70553 MRI BRAIN STEM W/O & W/DYE: CPT | Mod: TC

## 2019-04-12 PROCEDURE — 25500020 PHARM REV CODE 255: Performed by: PHYSICIAN ASSISTANT

## 2019-04-12 PROCEDURE — A9585 GADOBUTROL INJECTION: HCPCS | Performed by: PHYSICIAN ASSISTANT

## 2019-04-12 PROCEDURE — 72157 MRI CHEST SPINE W/O & W/DYE: CPT | Mod: TC

## 2019-04-12 RX ORDER — GADOBUTROL 604.72 MG/ML
10 INJECTION INTRAVENOUS
Status: COMPLETED | OUTPATIENT
Start: 2019-04-12 | End: 2019-04-12

## 2019-04-12 RX ADMIN — GADOBUTROL 8 ML: 604.72 INJECTION INTRAVENOUS at 01:04

## 2019-04-15 ENCOUNTER — PATIENT MESSAGE (OUTPATIENT)
Dept: NEUROLOGY | Facility: CLINIC | Age: 49
End: 2019-04-15

## 2019-04-17 ENCOUNTER — PATIENT MESSAGE (OUTPATIENT)
Dept: NEUROLOGY | Facility: CLINIC | Age: 49
End: 2019-04-17

## 2019-04-30 DIAGNOSIS — G35 MULTIPLE SCLEROSIS: ICD-10-CM

## 2019-04-30 RX ORDER — DIMETHYL FUMARATE 240 MG/1
CAPSULE ORAL
Qty: 180 CAPSULE | Refills: 0 | Status: SHIPPED | OUTPATIENT
Start: 2019-04-30 | End: 2019-06-25 | Stop reason: SDUPTHER

## 2019-05-16 DIAGNOSIS — G43.101 MIGRAINE WITH AURA AND WITH STATUS MIGRAINOSUS, NOT INTRACTABLE: ICD-10-CM

## 2019-05-16 RX ORDER — RIZATRIPTAN BENZOATE 10 MG/1
TABLET ORAL
Qty: 15 TABLET | Refills: 1 | Status: SHIPPED | OUTPATIENT
Start: 2019-05-16 | End: 2019-11-27 | Stop reason: SDUPTHER

## 2019-06-03 ENCOUNTER — PATIENT MESSAGE (OUTPATIENT)
Dept: NEUROLOGY | Facility: CLINIC | Age: 49
End: 2019-06-03

## 2019-06-25 DIAGNOSIS — G35 MULTIPLE SCLEROSIS: ICD-10-CM

## 2019-06-25 RX ORDER — DIMETHYL FUMARATE 240 MG/1
CAPSULE ORAL
Qty: 180 CAPSULE | Refills: 0 | Status: SHIPPED | OUTPATIENT
Start: 2019-06-25 | End: 2019-11-06 | Stop reason: SDUPTHER

## 2019-07-30 DIAGNOSIS — F51.01 PRIMARY INSOMNIA: ICD-10-CM

## 2019-07-30 RX ORDER — ZOLPIDEM TARTRATE 5 MG/1
TABLET ORAL
Qty: 90 TABLET | Refills: 1 | Status: SHIPPED | OUTPATIENT
Start: 2019-07-30 | End: 2020-05-20

## 2019-09-17 ENCOUNTER — PATIENT MESSAGE (OUTPATIENT)
Dept: NEUROLOGY | Facility: CLINIC | Age: 49
End: 2019-09-17

## 2019-09-17 DIAGNOSIS — G35 MULTIPLE SCLEROSIS: Primary | ICD-10-CM

## 2019-09-18 ENCOUNTER — PATIENT MESSAGE (OUTPATIENT)
Dept: NEUROLOGY | Facility: CLINIC | Age: 49
End: 2019-09-18

## 2019-09-21 ENCOUNTER — LAB VISIT (OUTPATIENT)
Dept: LAB | Facility: HOSPITAL | Age: 49
End: 2019-09-21
Attending: PHYSICIAN ASSISTANT
Payer: COMMERCIAL

## 2019-09-21 DIAGNOSIS — G35 MULTIPLE SCLEROSIS: ICD-10-CM

## 2019-09-21 LAB
ALBUMIN SERPL BCP-MCNC: 4.1 G/DL (ref 3.5–5.2)
ALP SERPL-CCNC: 74 U/L (ref 55–135)
ALT SERPL W/O P-5'-P-CCNC: 19 U/L (ref 10–44)
AST SERPL-CCNC: 19 U/L (ref 10–40)
BASOPHILS # BLD AUTO: 0.04 K/UL (ref 0–0.2)
BASOPHILS NFR BLD: 0.8 % (ref 0–1.9)
BILIRUB DIRECT SERPL-MCNC: 0.2 MG/DL (ref 0.1–0.3)
BILIRUB SERPL-MCNC: 0.6 MG/DL (ref 0.1–1)
DIFFERENTIAL METHOD: ABNORMAL
EOSINOPHIL # BLD AUTO: 0.1 K/UL (ref 0–0.5)
EOSINOPHIL NFR BLD: 1.8 % (ref 0–8)
ERYTHROCYTE [DISTWIDTH] IN BLOOD BY AUTOMATED COUNT: 12.4 % (ref 11.5–14.5)
HCT VFR BLD AUTO: 42.7 % (ref 37–48.5)
HGB BLD-MCNC: 13.3 G/DL (ref 12–16)
IMM GRANULOCYTES # BLD AUTO: 0.01 K/UL (ref 0–0.04)
IMM GRANULOCYTES NFR BLD AUTO: 0.2 % (ref 0–0.5)
LYMPHOCYTES # BLD AUTO: 1.5 K/UL (ref 1–4.8)
LYMPHOCYTES NFR BLD: 30.5 % (ref 18–48)
MCH RBC QN AUTO: 29.8 PG (ref 27–31)
MCHC RBC AUTO-ENTMCNC: 31.1 G/DL (ref 32–36)
MCV RBC AUTO: 96 FL (ref 82–98)
MONOCYTES # BLD AUTO: 0.4 K/UL (ref 0.3–1)
MONOCYTES NFR BLD: 7.3 % (ref 4–15)
NEUTROPHILS # BLD AUTO: 2.9 K/UL (ref 1.8–7.7)
NEUTROPHILS NFR BLD: 59.4 % (ref 38–73)
NRBC BLD-RTO: 0 /100 WBC
PLATELET # BLD AUTO: 321 K/UL (ref 150–350)
PMV BLD AUTO: 10.1 FL (ref 9.2–12.9)
PROT SERPL-MCNC: 7.2 G/DL (ref 6–8.4)
RBC # BLD AUTO: 4.46 M/UL (ref 4–5.4)
WBC # BLD AUTO: 4.92 K/UL (ref 3.9–12.7)

## 2019-09-21 PROCEDURE — 85025 COMPLETE CBC W/AUTO DIFF WBC: CPT

## 2019-09-21 PROCEDURE — 36415 COLL VENOUS BLD VENIPUNCTURE: CPT

## 2019-09-21 PROCEDURE — 80076 HEPATIC FUNCTION PANEL: CPT

## 2019-09-21 PROCEDURE — 86360 T CELL ABSOLUTE COUNT/RATIO: CPT

## 2019-09-21 PROCEDURE — 86359 T CELLS TOTAL COUNT: CPT

## 2019-09-24 LAB
ABSOLUTE CD3: 1251 CELLS/UL (ref 700–2100)
ABSOLUTE CD8: 272 CELLS/UL (ref 200–900)
CD3%: 73.9 % (ref 55–83)
CD3+CD4+ CELLS # BLD: 970 CELLS/UL (ref 300–1400)
CD3+CD4+ CELLS NFR BLD: 57.3 % (ref 28–57)
CD4/CD8 RATIO: 3.56 (ref 0.9–3.6)
CD8 % SUPPRESSOR T CELL: 16.1 % (ref 10–39)

## 2019-10-02 ENCOUNTER — OFFICE VISIT (OUTPATIENT)
Dept: NEUROLOGY | Facility: CLINIC | Age: 49
End: 2019-10-02
Payer: COMMERCIAL

## 2019-10-02 ENCOUNTER — DOCUMENTATION ONLY (OUTPATIENT)
Dept: NEUROLOGY | Facility: CLINIC | Age: 49
End: 2019-10-02

## 2019-10-02 VITALS
HEIGHT: 66 IN | WEIGHT: 187.38 LBS | SYSTOLIC BLOOD PRESSURE: 115 MMHG | BODY MASS INDEX: 30.11 KG/M2 | DIASTOLIC BLOOD PRESSURE: 75 MMHG | HEART RATE: 67 BPM

## 2019-10-02 DIAGNOSIS — N39.0 URINARY TRACT INFECTION WITHOUT HEMATURIA, SITE UNSPECIFIED: Primary | ICD-10-CM

## 2019-10-02 DIAGNOSIS — G35 MS (MULTIPLE SCLEROSIS): ICD-10-CM

## 2019-10-02 LAB
BILIRUB UR QL STRIP: NEGATIVE
CLARITY UR REFRACT.AUTO: CLEAR
COLOR UR AUTO: YELLOW
GLUCOSE UR QL STRIP: NEGATIVE
HGB UR QL STRIP: NEGATIVE
KETONES UR QL STRIP: NEGATIVE
LEUKOCYTE ESTERASE UR QL STRIP: NEGATIVE
NITRITE UR QL STRIP: NEGATIVE
PH UR STRIP: 7 [PH] (ref 5–8)
PROT UR QL STRIP: NEGATIVE
SP GR UR STRIP: 1.01 (ref 1–1.03)
URN SPEC COLLECT METH UR: NORMAL

## 2019-10-02 PROCEDURE — 99215 PR OFFICE/OUTPT VISIT, EST, LEVL V, 40-54 MIN: ICD-10-PCS | Mod: S$GLB,,, | Performed by: PSYCHIATRY & NEUROLOGY

## 2019-10-02 PROCEDURE — 99999 PR PBB SHADOW E&M-EST. PATIENT-LVL IV: ICD-10-PCS | Mod: PBBFAC,,, | Performed by: PSYCHIATRY & NEUROLOGY

## 2019-10-02 PROCEDURE — 99999 PR PBB SHADOW E&M-EST. PATIENT-LVL IV: CPT | Mod: PBBFAC,,, | Performed by: PSYCHIATRY & NEUROLOGY

## 2019-10-02 PROCEDURE — 3008F PR BODY MASS INDEX (BMI) DOCUMENTED: ICD-10-PCS | Mod: CPTII,S$GLB,, | Performed by: PSYCHIATRY & NEUROLOGY

## 2019-10-02 PROCEDURE — 81003 URINALYSIS AUTO W/O SCOPE: CPT

## 2019-10-02 PROCEDURE — 99215 OFFICE O/P EST HI 40 MIN: CPT | Mod: S$GLB,,, | Performed by: PSYCHIATRY & NEUROLOGY

## 2019-10-02 PROCEDURE — 3008F BODY MASS INDEX DOCD: CPT | Mod: CPTII,S$GLB,, | Performed by: PSYCHIATRY & NEUROLOGY

## 2019-10-02 RX ORDER — FLUTICASONE PROPIONATE 50 MCG
SPRAY, SUSPENSION (ML) NASAL
COMMUNITY
Start: 2019-04-26 | End: 2024-04-01

## 2019-10-02 RX ORDER — OXYBUTYNIN CHLORIDE 5 MG/1
TABLET ORAL
COMMUNITY
Start: 2019-08-19 | End: 2022-12-29

## 2019-10-02 RX ORDER — SALIVA STIMULANT COMB. NO.4
SPRAY, NON-AEROSOL (ML) MUCOUS MEMBRANE
COMMUNITY
End: 2024-04-01

## 2019-10-02 RX ORDER — TAMSULOSIN HYDROCHLORIDE 0.4 MG/1
CAPSULE ORAL
COMMUNITY
End: 2022-12-29

## 2019-10-02 NOTE — PROGRESS NOTES
Subjective:        Patient ID: Kasandra Garcia is a 49 y.o. female who presents today for a routine clinic visit for MS.      MS HPI:  · DMT: dimethyl fumarate  · Side effects from DMT? No  · Taking vitamin D3 as recommended? Yes - 5000 U daily, 02397 U every other day   · Complaining of lower abdominal pain very similar to a previous UTI she has had. Patient is having increased urinary urgency and frequency.   · Patient complaining of generalized pain from her fibromyalgia. Although pain I not under control, she states that it is not more than usual.   · As per last clinic note patient has began experiencing numbness/tingling/burning in her left face and left upper and lower extremity. However, those symptoms has resolved  · Patient currently does not work. She takes care of her grandson the entire day     Medications:  Current Outpatient Medications   Medication Sig    cholecalciferol, vitamin D3, (VITAMIN D3) 5,000 unit Tab Take 5,000 Units by mouth once daily. Alternate 5000 IU and 10,000 IU every other day.    estradiol 0.05 mg/24 hr td ptsw (VIVELLE-DOT) 0.05 mg/24 hr Place onto the skin.    nystatin-triamcinolone (MYCOLOG II) cream Apply topically as needed.     pregabalin (LYRICA) 100 MG capsule Take 100 mg by mouth 2 (two) times daily.    rizatriptan (MAXALT) 10 MG tablet TAKE 1 TABLET DAILY AS     NEEDED    TECFIDERA 240 mg CpDR TAKE ONE CAPSULE BY MOUTH TWICE DAILY. STORE IN ORIGINAL CONTAINER ATROOM TEMPERATURE.    zolpidem (AMBIEN) 5 MG Tab TAKE 1/2 TO 1 TABLET       NIGHTLY    diazePAM (VALIUM) 5 MG tablet Take 3 tabs po 45 min prior to MRI (Patient not taking: Reported on 10/2/2019)    DULoxetine (CYMBALTA) 30 MG capsule Take 30 mg by mouth once daily.    [id number] 1-1-500 mg Cap Take 1 capsule by mouth.     No current facility-administered medications for this visit.        SOCIAL HISTORY  Social History     Tobacco Use    Smoking status: Never Smoker    Smokeless  tobacco: Never Used   Substance Use Topics    Alcohol use: No     Alcohol/week: 0.0 standard drinks    Drug use: No       Living arrangements - the patient lives with their spouse.  MS REVIEW OF SYMPTOMS 9/30/2019   Do you feel abnormally tired on most days? Yes   Do you feel you generally sleep well? Yes   Do you have difficulty controlling your bladder?  No   Do you have difficulty controlling your bowels?  No   Do you have frequent muscle cramps, tightness or spasms in your limbs?  Yes- combination of MS and fibromyalgia    Do you have new visual symptoms?  No   Do you have worsening difficulty with your memory or thinking? No   Do you have worsening symptoms of anxiety or depression?  Yes   For patients who walk, Do you have more difficulty walking?  No   Have you fallen since your last visit?  No   For patients who use wheelchairs: Do you have any skin wounds or breakdown? Not Applicable   Do you have difficulty using your hands?  No   Do you have shooting or burning pain? Yes   Do you have difficulty with sexual function?  No   If you are sexually active, are you using birth control? Y/N  N/A Yes   Do you often choke when swallowing liquids or solid food?  No   Do you experience worsening symptoms when overheated? Yes   Do you need any new equipment such as a wheelchair, walker or shower chair? No   Do you receive co-pay financial assistance for your principal MS medicine? Yes   Would you be interested in participating in an MS research trial in the future? Yes   Do you feel you have adequate family/friend support?  Yes   Do you have health insurance?   Yes   Are you currently employed? No   Do you receive SSDI/SSI?  No   Do you use marijuana or cannabis products? No   Have you been diagnosed with a urinary tract infection since your last visit here? Yes   Have you been diagnosed with a respiratory tract infection since your last visit here? No   Have you been to the emergency room since your last visit  here? No   Have you been hospitalized since your last visit here?  No         FSS SCORE & INTERPRETATION 9/30/2019   FSS SCORE  63   FSS SCORE INTERPRETATION May be suffering from fatigue     MS MARYLIN-D SCORE & INTERPRETATION 9/30/2019   MARYLIN-D SCORE  38   MARYLIN-D INTERPRETATION  Possibility of major Depression     MS YOHANNES-7 SCORE & INTERPRETATION 9/30/2019   YOHANNES-7 SCORE  6   YOHANNES-7 SCORE INTERPRETATION Mild Anxiety     PEQ MS MOS PAIN EFFECTS SCORE & INTERPRETATION 9/30/2019   PES SCORE 17   PES SCORE INTERPRETATION Scores can range from 6-30.  Items are scaled so that higher scores indicate a greater impact of pain on a patients mood and behavior.     PEQ MS SEXUAL SATISFACTION SCORE & INTERPRETATION 9/30/2019   SSS SCORE  9   SSS SCORE INTERPRETATION Scores can range from 4-24.  Higher scores indicate greater problems with sexual satisfaction.     MS BLADDER CONTROL SCORE & INTERPRETATION 9/30/2019   BLCS SCORE 0   BLCS SCORE INTERPRETATION  Scores can range from 0-22, with higher scores indicating greater bladder control problems.     MS BOWEL CONTROL SCORE & INTERPRETATION 9/30/2019   BWCS SCORE 0   BWCS SCORE INTERPRETATION Scores can range from 0-26, with higher scores indicating greater bowel control problems.     PEQ MS IMPACT OF VISUAL IMPAIRMENT SCORE & INTERPRETATION 9/30/2019   TONY SCALE SCORE  1   TONY SCORE INTERPRETATION Scores can range from 0-15, with higher scores indicating greater impact of visual problems on daily activites.     MS PDQ SCORE & INTERPRETATION 9/30/2019   PDQ RETROSPECTIVE MEMORY SUBSCALE 10   PDQ ATTENTION/CONCENTRATION SUBSCALE 13   PDQ PROSPECTIVE MEMORY SUBSCALE 9   PDQ PLANNING/ORGANIZATION SUBSCALE 14   PDQ TOTAL SCORE 46   PDQ SCORE INTERPRETATION Scores can range from 0-80, with higher scores indicating greater perceived cognitive impairment.     MSSS SCORE & INTERPRETATION 9/30/2019   MSSS TANGIBLE SUPPORT SUBSCALE 81.25   MSSS EMOTIONAL/INFORMATIONAL SUPPORT SUBSCALE 100    MSSS AFFECTIONATE SUPPORT SUBSCALE 100   MSSS POSITIVE SOCIAL INTERACTION SUBSCALE 100   MSSS TOTAL SCORE 95.31   MSSS SCORE INTERPRETATION Scores can range from 0-100, with higher scores indicating greater perceived support.           Objective:        1. 25 foot timed walk:  Timed 25 Foot Walk: 8/8/2017 10/2/2019   Did patient wear an AFO? No No   Was assistive device used? No No   Time for 25 Foot Walk (seconds) 3.4 3.05   Time for 25 Foot Walk (seconds) - 3.45       Neurologic Exam  General appearance: Well nourished, well developed, no acute distress.         Cardiovascular:  pedal pulses 2, no edema or cyanosis, heart regular rate and rhythym, no carotid bruits.         Abdomen: Pain on palpation of lower abdomen   -------------------------------------------------------------  Facial Expression: normal       Affect: full       Orientation to time & place:  Oriented to time, place, person and situation       Attention & concentration:  Normal attention span and concentration       Memory:  Recent and remote memory intact  Language: Spontaneous, fluent; able to repeat and name objects        Fund of knowledge:  Aware of current events        Speech:  normal (not dysarthric)  -------------------------------------------------------  Cranial nerves: normal visual acuity, pupils equal round and reactive, extraocular movements intact,       facial sensation intact, face symmetrical, hearing intact to whisper, palate raises midline, shoulder shrug strength normal, tongue protrudes midline.        -------------------------------------------------------  Musculoskeletal  Muscle tone: all 4 extremities normal        Muscle Bulk: all 4 extremities normal        Muscle strength:  5/5 in all 4 extremities        No pronator drift  Sensation: Intact to light touch, pin prick, vibration in all extremities        Deep tendon Reflexes: 2+ bilateral biceps, triceps, patella and ankles         --------------------------------------------------------------  Cerebellar and Coordination  Gait:  normal, able to tandem without difficulty        Finger-nose: no dysmetria       Rapid Alternating Movements (pronation/supination):  R normal; L normal  --------------------------------------------------------------  MOVEMENT DISORDERS FOCUSED EXAM  Abnormality of movement (bradykinesia, hyperkinesia) present? No    Tremor present?   No   Posture:  normal  Postural stability:  no Rhomberg        Imaging:   MRI Brain w and w/o contrast 4/12/2019:  MRI brain stable with no new or active lesions     MRI Cervical Spine 4/12/2019:Persistent abnormal T2 hyperintensity within the cord substance at the levels of C2 and T1 which may relate to demyelinating disease.  No definite new cord signal abnormalities.    MRI Thoracic Spine 4/12/19  1. Abnormal T2 signal within the dorsal cord at the level of T6-T7 may relate to a demyelinating plaque.  No abnormal enhancement as may occur with active demyelination.  2. Degenerative findings of the mid upper thoracic spine as detailed above.  Mild foraminal stenosis at several levels      Labs:     Lab Results   Component Value Date    [id number] 56 02/15/2019    MGTCURDE40FS 51 02/06/2018    HCQCVDUH57EN 58 02/09/2017     No results found for: JCVINDEX, JCVANTIBODY  Lab Results   Component Value Date    BH5GJFKA 73.9 09/21/2019    ABSOLUTECD3 1251 09/21/2019    TQ5FHQLA 16.1 09/21/2019    ABSOLUTECD8 272 09/21/2019    CD0TDXQD 57.3 (H) 09/21/2019    ABSOLUTECD4 970 09/21/2019    LABCD48 3.56 09/21/2019     Lab Results   Component Value Date    WBC 4.92 09/21/2019    RBC 4.46 09/21/2019    HGB 13.3 09/21/2019    HCT 42.7 09/21/2019    MCV 96 09/21/2019    MCH 29.8 09/21/2019    MCHC 31.1 (L) 09/21/2019    RDW 12.4 09/21/2019     09/21/2019    MPV 10.1 09/21/2019    GRAN 2.9 09/21/2019    GRAN 59.4 09/21/2019    LYMPH 1.5 09/21/2019    LYMPH 30.5 09/21/2019    MONO 0.4  09/21/2019    MONO 7.3 09/21/2019    EOS 0.1 09/21/2019    BASO 0.04 09/21/2019    EOSINOPHIL 1.8 09/21/2019    BASOPHIL 0.8 09/21/2019     Sodium   Date Value Ref Range Status   03/22/2019 139 136 - 145 mmol/L Final     Potassium   Date Value Ref Range Status   03/22/2019 4.5 3.5 - 5.1 mmol/L Final     Chloride   Date Value Ref Range Status   03/22/2019 103 95 - 110 mmol/L Final     CO2   Date Value Ref Range Status   03/22/2019 29 23 - 29 mmol/L Final     Glucose   Date Value Ref Range Status   03/22/2019 94 70 - 110 mg/dL Final     BUN, Bld   Date Value Ref Range Status   03/22/2019 15 6 - 20 mg/dL Final     Creatinine   Date Value Ref Range Status   03/22/2019 0.8 0.5 - 1.4 mg/dL Final     Calcium   Date Value Ref Range Status   03/22/2019 9.7 8.7 - 10.5 mg/dL Final     Total Protein   Date Value Ref Range Status   09/21/2019 7.2 6.0 - 8.4 g/dL Final     Albumin   Date Value Ref Range Status   09/21/2019 4.1 3.5 - 5.2 g/dL Final     Total Bilirubin   Date Value Ref Range Status   09/21/2019 0.6 0.1 - 1.0 mg/dL Final     Comment:     For infants and newborns, interpretation of results should be based  on gestational age, weight and in agreement with clinical  observations.  Premature Infant recommended reference ranges:  Up to 24 hours.............<8.0 mg/dL  Up to 48 hours............<12.0 mg/dL  3-5 days..................<15.0 mg/dL  6-29 days.................<15.0 mg/dL       Alkaline Phosphatase   Date Value Ref Range Status   09/21/2019 74 55 - 135 U/L Final     AST   Date Value Ref Range Status   09/21/2019 19 10 - 40 U/L Final     ALT   Date Value Ref Range Status   09/21/2019 19 10 - 44 U/L Final     Anion Gap   Date Value Ref Range Status   03/22/2019 7 (L) 8 - 16 mmol/L Final     eGFR if    Date Value Ref Range Status   03/22/2019 >60.0 >60 mL/min/1.73 m^2 Final     eGFR if non    Date Value Ref Range Status   03/22/2019 >60.0 >60 mL/min/1.73 m^2 Final     Comment:      Calculation used to obtain the estimated glomerular filtration  rate (eGFR) is the CKD-EPI equation.         No results found for: HEPBSAG, HEPBSAB, HEPBCAB      Impression:       1. Multiple Sclerosis  · Assessment: Ms. Garcia presents for a routine check up to evaluate her MS. Her timed walk is faster than last visit and patient does not have sensory deficits that she had last clinic visit. Patient however, does complain of lower abdominal pain with an increased urinary urgency and frequency. Flank pain is negative. Will obtain urinalysis. Encouraged patinet to drink plenty of water after intercourse.    · Imaging: Will obtain MRI brain demyelinating without contrast   · Disease Modifying Therapies:Continue tecfidera     2. MS Symptom Assessment / Management  · Bladder: Patient complaining of frequent UTIs since starting the tecfidera. Told patient to drink plenty of fluids after intercourse (whioch is usually when she notices the UTI). Will obtain urinalysis and monitor symptoms for now.        Over 50% of this 40 minute visit was spent in direct face to face counseling of the patient about MS, DMT considerations, and MS symptom management.     Problem List Items Addressed This Visit        Neuro    MS (multiple sclerosis)    Relevant Orders    MRI Brain Demyelinating Without Contrast      Other Visit Diagnoses     Urinary tract infection without hematuria, site unspecified    -  Primary    Relevant Orders    Urinalysis, Reflex to Urine Culture Urine, Clean Catch          Marina Harper MD   [facility]  PGY II, Neurology Resident

## 2019-10-09 NOTE — PROGRESS NOTES
I have personally seen and examined the patient today along with  , and agree with assessment and recommendations.      Fauzia Matthews MD  [facility]

## 2019-10-19 NOTE — PROGRESS NOTES
"Subjective:       Patient ID: Kasandra Garcia is a 48 y.o. female who presents today for a routine clinic visit for MS.      MS HPI:  · DMT: Tecfidera  · Side effects from DMT? No  · Taking vitamin D3 as recommended? Yes - 5,000IU M-F, 10,000IU weekends   · Having continued muscle pain "all over"-  · She feels like the switch to Cymbalta has helped her anxiety as well as her nerve pain.     SOCIAL HISTORY  Social History     Tobacco Use    Smoking status: Never Smoker    Smokeless tobacco: Never Used   Substance Use Topics    Alcohol use: No     Alcohol/week: 0.0 oz    Drug use: No     Living arrangements - the patient lives with their family.  Employment: disability half-way through [employer] half[employer]way, appealing SS disability for Medicare.      MS ROS:  · Fatigue: Yes - no longer on a stimulant;    · Sleep Disturbance: Yes -  Ambien HS-feels like helps her to get to sleep but not stay asleep-  · Bladder Dysfunction: No   · Bowel Dysfunction: No  · Spasticity: Yes - No longer taking Baclofen and Tizanidine--tried Botox in past unsure if this is helpful  · Visual Symptoms: Yes - age related reading; wears reading glasses   · Cognitive: Yes - stable;  Not better not worse;   · Mood Disorder: Yes - stable; Cymbalta 30mg-feels like this has helped with her anxiety and nerve discomfort  · Gait Disturbance: No  · Falls: No  · Hand Dysfunction: No  · Pain: Yes - from fibromyalgia; changed to gabapentin 100 mg BID;  prescribed by Dr. Marie   · Sexual Dysfunction: Not Assessed  · Skin Breakdown: No  · Tremors: No  · Dysphagia:  No  · Dysarthria:  No  · Heat sensitivity:  No  · Any un-met adaptive needs? No  · Copay Assist?  Yes - $0  · Clinical Trial candidate?No            Objective:        1. 25 foot timed walk: 3.7s today; 3.6s last visit; 3.42 previous visit  Timed 25 Foot Walk: 2/9/2017 8/8/2017   Did patient wear an AFO? No No   Was assistive device used? No No   Time for 25 Foot Walk (seconds) 3.8 3.4 " ----- Message from Katiuska Sahu MD sent at 10/18/2019  7:16 AM CDT -----  Call and notify pt her vitamin d is improved but remains a bit low   Recommend increasing her vitamin d to 2000 units a day   Rest of labs stable continue current medciations         Neurologic Exam     Mental Status   Oriented to person, place, and time.   Follows 3 step commands.   Speech: speech is normal   Level of consciousness: alert  Normal comprehension.     Cranial Nerves     CN II   Visual acuity: normal (20/20 Od and OS with Snellen hand held chart at 6 ft)    CN III, IV, VI   Pupils are equal, round, and reactive to light.  Extraocular motions are normal.     CN V   Facial sensation intact.     CN VII   Facial expression full, symmetric.     CN VIII   Hearing: intact (finger rub)    CN IX, X   Palate: symmetric    CN XI   CN XI normal.     CN XII   Tongue deviation: none    Motor Exam   Muscle bulk: normal  Overall muscle tone: normal    Strength   Right deltoid: 5/5  Left deltoid: 5/5  Right triceps: 5/5  Left triceps: 5/5  Right wrist extension: 5/5  Left wrist extension: 5/5  Right interossei: 5/5  Left interossei: 5/5  Right iliopsoas: 5/5  Left iliopsoas: 5/5  Right hamstrin/5  Left hamstrin/5  Right anterior tibial: 5/5  Left anterior tibial: 5/5  Right peroneal: 5/5  Left peroneal: 5/5    Sensory Exam   Right leg light touch: tingling in toes.  Left leg light touch: tingling in toes.  Right arm vibration: normal  Left arm vibration: normal  Right leg vibration: decreased from ankle  Left leg vibration: decreased from toes    Gait, Coordination, and Reflexes     Gait  Gait: normal    Coordination   Finger-nose-finger test: slightly slowed on L UE.  Heel to shin coordination: normal  Tandem walking coordination: normal    Tremor   Resting tremor: absent  Action tremor: absent    Reflexes   Right brachioradialis: 2+  Left brachioradialis: 2+  Right biceps: 2+  Left biceps: 2+  Right triceps: 2+  Left triceps: 2+  Right patellar: 2+  Left patellar: 2+  Right achilles: 2+  Left achilles: 2+  Right plantar: equivocal  Left plantar: equivocal  Right ankle clonus: absent  Left ankle clonus: absent  Right pendular knee jerk: absent  Left pendular knee jerk:  absentPreponderance of reflexes on L LE as compared to R LE    Normal Heel/toe walk  Normal RSM  Difficult hopping on L LE as compared to R LE             Imaging:       Results for orders placed during the hospital encounter of 07/27/18   MRI Brain W WO Contrast    Narrative EXAMINATION:  MRI BRAIN W WO CONTRAST    CLINICAL HISTORY:  Multiple sclerosisMultiple sclerosis, new neurological event;    TECHNIQUE:  Standard multiplanar noncontrast and contrast enhanced sequences of the brain performed with 7 cc Gadavist.    COMPARISON:  Seven hundred twenty-nine tooth 16    FINDINGS:  The corpus callosum is intact.  The ventricles are nondilated.    There are several periventricular white-matter lesions with orientations perpendicular to the ventral margin.  Several scattered lesions are noted 1 within the left middle cerebellar peduncle.  Additionally several lesions display hypointense T1 signal.  None display significant contrast enhancement or mass effect.    No associated restricted diffusion.  The overall appearance is similar to the previous exam.    No additional findings.      Impression Stable MRI of the brain.  White matter lesions consistent with demyelinating plaques of multiple sclerosis.  No significant interval change since 2016 MRI.      Electronically signed by: Aman Pastor MD  Date:    07/27/2018  Time:    16:42     Results for orders placed during the hospital encounter of 07/29/16   MRI Cervical Spine W WO Cont    Narrative Technique: Multiplanar, multisequence MRI of the cervical spine was performed prior to and following administration of 9 cc IV Gadavist.    Comparison: 2/4/2013    Findings:    There is a large amount of susceptibility artifact related to multilevel postoperative changes in the cervical spine.  Adjacent anatomy is distorted with subsequent decrease in exam sensitivity and specificity.  Interpretation is offered within the confines.    Again demonstrated is subtle patchy  signal abnormality within the cervical cord extending from C2-C3 as well as at the cervicothoracic junction.  Cord caliber appears normal with no definite edema demonstrated.  No new lesions identified.  No abnormal enhancement to suggest active demyelination.    Limited evaluation of the neck soft tissues is unremarkable.    No definite spinal canal or neuroforaminal stenosis appreciated noting substantial limitations due to artifact.    Impression Unchanged patchy cord signal abnormality throughout cervical cord most in keeping with chronic multifocal demyelinating plaque in this patient with known history of multiple sclerosis.  No new lesions or evidence of active demyelination.        ______________________________________     Electronically signed by: MELISSA ABEL MD  Date:     07/29/16  Time:    13:08          Labs:     Lab Results   Component Value Date    TAUSZNJP21RF 56 02/15/2019    OOKOAVFT35CX 51 02/06/2018    PWTBNUBG26LY 58 02/09/2017     No results found for: JCVINDEX, JCVANTIBODY  Lab Results   Component Value Date    RC3NAAUA 71.7 08/02/2018    ABSOLUTECD3 1074 08/02/2018    EB9ZQREQ 14.2 08/02/2018    ABSOLUTECD8 213 08/02/2018    RN4QVZUE 56.9 08/02/2018    ABSOLUTECD4 852 08/02/2018    LABCD48 4.00 (H) 08/02/2018     Lab Results   Component Value Date    WBC 5.42 02/15/2019    RBC 4.59 02/15/2019    HGB 13.9 02/15/2019    HCT 43.5 02/15/2019    MCV 95 02/15/2019    MCH 30.3 02/15/2019    MCHC 32.0 02/15/2019    RDW 12.6 02/15/2019     02/15/2019    MPV 9.4 02/15/2019    GRAN 3.2 02/15/2019    GRAN 59.0 02/15/2019    LYMPH 1.7 02/15/2019    LYMPH 30.8 02/15/2019    MONO 0.4 02/15/2019    MONO 7.4 02/15/2019    EOS 0.1 02/15/2019    BASO 0.04 02/15/2019    EOSINOPHIL 1.7 02/15/2019    BASOPHIL 0.7 02/15/2019     Sodium   Date Value Ref Range Status   01/22/2014 141 136 - 145 mmol/L Final     Potassium   Date Value Ref Range Status   01/22/2014 3.9 3.5 - 5.1 mmol/L Final     Chloride    Date Value Ref Range Status   01/22/2014 104 95 - 110 mmol/L Final     CO2   Date Value Ref Range Status   01/22/2014 28 23 - 29 mmol/L Final     Glucose   Date Value Ref Range Status   01/22/2014 111 (H) 70 - 110 mg/dL Final     BUN, Bld   Date Value Ref Range Status   01/22/2014 14 6 - 20 mg/dL Final     Creatinine   Date Value Ref Range Status   01/22/2014 0.9 0.5 - 1.4 mg/dL Final     Calcium   Date Value Ref Range Status   01/22/2014 9.4 8.7 - 10.5 mg/dL Final     Total Protein   Date Value Ref Range Status   02/15/2019 7.5 6.0 - 8.4 g/dL Final     Albumin   Date Value Ref Range Status   02/15/2019 4.2 3.5 - 5.2 g/dL Final     Total Bilirubin   Date Value Ref Range Status   02/15/2019 0.6 0.1 - 1.0 mg/dL Final     Comment:     For infants and newborns, interpretation of results should be based  on gestational age, weight and in agreement with clinical  observations.  Premature Infant recommended reference ranges:  Up to 24 hours.............<8.0 mg/dL  Up to 48 hours............<12.0 mg/dL  3-5 days..................<15.0 mg/dL  6-29 days.................<15.0 mg/dL       Alkaline Phosphatase   Date Value Ref Range Status   02/15/2019 75 55 - 135 U/L Final     AST   Date Value Ref Range Status   02/15/2019 21 10 - 40 U/L Final     ALT   Date Value Ref Range Status   02/15/2019 19 10 - 44 U/L Final     Anion Gap   Date Value Ref Range Status   01/22/2014 9 8 - 16 mmol/L Final     eGFR if    Date Value Ref Range Status   01/22/2014 >60.0 >60 mL/min/1.73 m^2 Final     eGFR if non    Date Value Ref Range Status   01/22/2014 >60.0 >60 mL/min/1.73 m^2 Final     Comment:     Calculation used to obtain the estimated glomerular filtration  rate (eGFR) is the CKD-EPI equation. Since race is unknown   in our information system, the eGFR values for   -American and Non--American patients are given   for each creatinine result.       Diagnosis/Assessment/Plan:    1. Multiple  Sclerosis  · Assessment: She is stable on Tecfidera and high dose Vit D3  · Imaging: will hold on MRI for now(patient would prefer to defer to next year)-will consider again next visit.   · Disease Modifying Therapies: Continue Tecfidera and high dose Vit D3-will check safety labs along with Vit D3 and make appropriate recommendatiomns    2. MS Symptom Assessment / Management    · Sleep Disturbance: will try a holiday from Ambien__every other night, add meditation(suggested Calm BROOKE). If this is not successful consider increasing 6.25XR version of Ambien  · Spasticity: consider Botox again--will start with 250 units if patient decided to move ahead--encouraged daily stretching/yoga  · Gait Disturbance: recommended increased exercises--swimming, walking, yoga, gym,etc--will be helpful for her overall health and wellness/fibromyalgia along with MS of course.       Over 50% of this 40 minute visit was spent in direct face to face counseling of the patient about MS, DMT considerations, and MS symptom management.     Follow-up in about 6 months (around 8/15/2019) for follow up with Dr. Matthews.  Patient agreed to POC today.    Attending, Dr. Matthews, was available during today's encounter.     Shakira West PA-C  [facility]    Problem List Items Addressed This Visit        Neuro    Multiple sclerosis - Primary (Chronic)    Relevant Orders    CBC auto differential (Completed)    Pacific-Suppressor Ratio    Hepatic function panel (Completed)    Vitamin D (Completed)       Other    Encounter for long-term (current) use of high-risk medication    Relevant Orders    CBC auto differential (Completed)    [facility]-Suppressor Ratio    Hepatic function panel (Completed)    Counseling regarding goals of care    CPAP (continuous positive airway pressure) dependence      Other Visit Diagnoses     Vitamin D insufficiency        Relevant Orders    Vitamin D (Completed)    Primary insomnia        Relevant Medications    zolpidem (AMBIEN) 5 MG  Tab    Fibromyalgia

## 2019-11-06 DIAGNOSIS — G35 MULTIPLE SCLEROSIS: ICD-10-CM

## 2019-11-06 RX ORDER — DIMETHYL FUMARATE 240 MG/1
CAPSULE ORAL
Qty: 180 CAPSULE | Refills: 0 | Status: SHIPPED | OUTPATIENT
Start: 2019-11-06 | End: 2020-04-15

## 2019-11-27 DIAGNOSIS — G43.101 MIGRAINE WITH AURA AND WITH STATUS MIGRAINOSUS, NOT INTRACTABLE: ICD-10-CM

## 2019-11-27 DIAGNOSIS — F51.01 PRIMARY INSOMNIA: ICD-10-CM

## 2019-12-02 RX ORDER — ZOLPIDEM TARTRATE 5 MG/1
TABLET ORAL
Qty: 90 TABLET | Refills: 1 | Status: SHIPPED | OUTPATIENT
Start: 2019-12-02 | End: 2020-04-16 | Stop reason: SDUPTHER

## 2019-12-05 RX ORDER — RIZATRIPTAN BENZOATE 10 MG/1
TABLET ORAL
Qty: 15 TABLET | Refills: 1 | Status: SHIPPED | OUTPATIENT
Start: 2019-12-05 | End: 2020-09-28 | Stop reason: SDUPTHER

## 2020-02-06 ENCOUNTER — DOCUMENTATION ONLY (OUTPATIENT)
Dept: NEUROLOGY | Facility: CLINIC | Age: 50
End: 2020-02-06

## 2020-02-12 ENCOUNTER — PATIENT MESSAGE (OUTPATIENT)
Dept: NEUROLOGY | Facility: CLINIC | Age: 50
End: 2020-02-12

## 2020-03-18 ENCOUNTER — PATIENT MESSAGE (OUTPATIENT)
Dept: NEUROLOGY | Facility: CLINIC | Age: 50
End: 2020-03-18

## 2020-04-15 ENCOUNTER — PATIENT MESSAGE (OUTPATIENT)
Dept: NEUROLOGY | Facility: CLINIC | Age: 50
End: 2020-04-15

## 2020-04-15 DIAGNOSIS — G35 MULTIPLE SCLEROSIS: ICD-10-CM

## 2020-04-15 RX ORDER — DIMETHYL FUMARATE 240 MG/1
CAPSULE ORAL
Qty: 180 CAPSULE | Refills: 0 | Status: SHIPPED | OUTPATIENT
Start: 2020-04-15 | End: 2020-11-04

## 2020-04-15 NOTE — PROGRESS NOTES
Subjective:          Patient ID: Kasandra Garcia is a 49 y.o. female who presents today for a routine  video visit for MS.  Last visit to [facility] in October with Dr. Matthews.    MS HPI:  · DMT: dimethyl fumarate  · Side effects from DMT? No  · Taking vitamin D3 as recommended? Yes - alternating 5,000IU and 10,000IU   · Tumeric HS  · Wears mouthpiece for snoring--was tested with sleep study (6 years ago) and negative  · She normally takes care of her grandson, but during this COVID pandemic she has not.     Medications:  Current Outpatient Medications   Medication Sig    cholecalciferol, vitamin D3, (VITAMIN D3) 5,000 unit Tab Take 5,000 Units by mouth once daily. Alternate 5000 IU and 10,000 IU every other day.    cranberry extract 500 mg Cap Take by mouth.    diazePAM (VALIUM) 5 MG tablet Take 3 tabs po 45 min prior to MRI (Patient not taking: Reported on 10/2/2019)    DULoxetine (CYMBALTA) 30 MG capsule Take 30 mg by mouth once daily.    estradiol 0.05 mg/24 hr td ptsw (VIVELLE-DOT) 0.05 mg/24 hr Place onto the skin.    fluticasone propionate (FLONASE) 50 mcg/actuation nasal spray fluticasone propionate 50 mcg/actuation nasal spray,suspension    loratadine (CLARITIN ORAL) Claritin    tm-[id number] (RHEUMATE) 1-1-500 mg Cap Rheumate 1 mg-1 mg-500 mg capsule   TAKE ONE CAPSULE BY MOUTH DAILY    wh-lkansdjmaygttjai-[id number] 1-1-500 mg Cap Take 1 capsule by mouth.    milnacipran (SAVELLA) 25 mg Tab tablet Take 25 mg by mouth.    nystatin-triamcinolone (MYCOLOG II) cream Apply topically as needed.     oxybutynin (DITROPAN) 5 MG Tab     pregabalin (LYRICA) 100 MG capsule Take 100 mg by mouth 2 (two) times daily.    rizatriptan (MAXALT) 10 MG tablet TAKE 1 TABLET DAILY AS     NEEDED    tamsulosin (FLOMAX) 0.4 mg Cap tamsulosin 0.4 mg capsule   TAKE 1 CAPSULE BY MOUTH AT BEDTIME    TECFIDERA 240 mg CpDR TAKE ONE CAPSULE BY MOUTH TWICE DAILY. STORE IN ORIGINAL CONTAINER AT ROOM  TEMPERATURE.    zolpidem (AMBIEN) 5 MG Tab TAKE 1/2 TO 1 TABLET       NIGHTLY    zolpidem (AMBIEN) 5 MG Tab TAKE 1/2 TO 1 TABLET       NIGHTLY     No current facility-administered medications for this visit.        SOCIAL HISTORY  Social History     Tobacco Use    Smoking status: Never Smoker    Smokeless tobacco: Never Used   Substance Use Topics    Alcohol use: No     Alcohol/week: 0.0 standard drinks    Drug use: No       Living arrangements - the patient lives with their family.    ROS:    REVIEW OF SYMPTOMS 9/30/2019   Do you feel abnormally tired on most days? Yes-mild currently --some improved as she is not currently taking care of grandchild   Do you feel you generally sleep well? Yes--ok with Ambien   Do you have difficulty controlling your bladder?  No   Do you have difficulty controlling your bowels?  No   Do you have frequent muscle cramps, tightness or spasms in your limbs?  Yes--somewhat improved --she feels may be due to her decreased activity level(MS and fibro related)   Do you have new visual symptoms?  No   Do you have worsening difficulty with your memory or thinking? No   Do you have worsening symptoms of anxiety or depression?  Yes   For patients who walk, Do you have more difficulty walking?  No   Have you fallen since your last visit?  No   For patients who use wheelchairs: Do you have any skin wounds or breakdown? Not Applicable   Do you have difficulty using your hands?  No   Do you have shooting or burning pain? Yes   Do you have difficulty with sexual function?  No   If you are sexually active, are you using birth control? Y/N  N/A Yes   Do you often choke when swallowing liquids or solid food?  No   Do you experience worsening symptoms when overheated? Yes   Do you need any new equipment such as a wheelchair, walker or shower chair? No   Do you receive co-pay financial assistance for your principal MS medicine? Yes   Would you be interested in participating in an MS research trial  in the future? Yes   Do you feel you have adequate family/friend support?  Yes   Do you have health insurance?   Yes   Are you currently employed? No   Do you receive SSDI/SSI?  No   Do you use marijuana or cannabis products? No   Have you been diagnosed with a urinary tract infection since your last visit here? Yes   Have you been diagnosed with a respiratory tract infection since your last visit here? No   Have you been to the emergency room since your last visit here? No   Have you been hospitalized since your last visit here?  No                Objective:        1. 25 foot timed walk:  Timed 25 Foot Walk: 8/8/2017 10/2/2019   Did patient wear an AFO? No No   Was assistive device used? No No   Time for 25 Foot Walk (seconds) 3.4 3.05   Time for 25 Foot Walk (seconds) - 3.45       Neurologic Exam     Mental Status   Oriented to person, place, and time.   Attention: normal.   Speech: speech is normal   Level of consciousness: alert  Normal comprehension.     Cranial Nerves     CN III, IV, VI   Extraocular motions are normal.     CN VII   Facial expression full, symmetric.     CN VIII   Hearing: intact (to conversation through video visit)    CN XI   Right trapezius strength: normal (full AROM noted)  Left trapezius strength: normal (full AROM noted)    CN XII   Tongue deviation: none    Motor Exam   Muscle bulk: normal  MMT not performed due to nature of video visit; however, observed full AROM of UE's and LE's     Gait, Coordination, and Reflexes     Gait  Gait: normal    Coordination   Heel to shin coordination: normal  Tandem walking coordination: normal  RSM intact  Patient able to perform heel/toe/tandem walk independently  Patient able to hop on each foot individually, full squat, and demonstrate high-stepping         Imaging:     Results for orders placed during the hospital encounter of 04/12/19   MRI Brain W WO Contrast    Narrative EXAMINATION:  MRI BRAIN W WO CONTRAST    CLINICAL HISTORY:  Multiple  sclerosisMS worsening symptoms;    TECHNIQUE:  Sagittal T1, T2 FLAIR.  Axial T1, T2, T2 FLAIR, DWI.  Axial and coronal T1 post contrast.    COMPARISON:  Previous MRI examinations of the brain from July 27, 2018 and July 29, 2016 were reviewed.    FINDINGS:  There is no restricted diffusion. Again demonstrated are multiple elliptical shaped foci of T2 FLAIR hyperintensity that radiate outward from the bodies of the lateral ventricles consistent with demyelinating plaques of multiple sclerosis.  Mild T2 FLAIR hyperintense signal about the temporal horn of the left lateral ventricle laterally.  Several of the lesions demonstrate central volume loss such as within the mid left corona radiata.  No abnormal enhancement.  These findings are unchanged compared to the prior 2 studies.  No knew such T2 FLAIR hyperintense lesions.    The ventricles and sulci are normal in size and configuration. Midline structures are normal. There are preserved arterial flow-voids on T2 weighted imaging. The paranasal sinuses and mastoid air cells are clear.    No abnormal marrow signal is identified.      Impression Persistent findings consistent with demyelinating disease and multiple sclerosis.  No abnormal enhancement as may occur with active demyelination.  No significant change compared to the prior to MRI examinations of the brain dating back to July 2016.      Electronically signed by: Wade Ramirez Jr., MD  Date:    04/12/2019  Time:    14:20     Results for orders placed during the hospital encounter of 04/12/19   MRI Cervical Spine W WO Cont    Narrative EXAMINATION:  MRI CERVICAL SPINE W WO CONTRAST    CLINICAL HISTORY:  Multiple sclerosisworsening MS symptoms;    TECHNIQUE:  TECHNIQUE: MR Cervical spine with contrast. Sagittal T1, T2, PD, STIR.  Axial T1, T2. Post contrast Sagittal and axial T1.    COMPARISON:  Prior MRI of the cervical spine from July 29, 2016.    FINDINGS:  Again demonstrated is pronounced metal artifact within  the mid cervical region relating to prior surgery.  This somewhat limits evaluation.  Persistent suggestion of patchy T2 hyperintensity within the dorsal cord at the level of C2 and within the left hemicord at the level of T1. No new abnormal cord signal within the limits of artifact.  No definite abnormal enhancement.      Impression 1. Limited exam due to metal artifact from to prior cervical spine surgery.  The cord is not well demonstrated from C3 inferiorly to C7.  2. Persistent abnormal T2 hyperintensity within the cord substance at the levels of C2 and T1 which may relate to demyelinating disease.  No definite new cord signal abnormalities.      Electronically signed by: Wade Ramirez Jr., MD  Date:    04/12/2019  Time:    14:35     Results for orders placed during the hospital encounter of 04/12/19   MRI Thoracic Spine W WO Cont    Narrative EXAMINATION:  MRI THORACIC SPINE W WO CONTRAST    CLINICAL HISTORY:  Multiple sclerosisworsening MS symptoms;    TECHNIQUE:  MR Thoracic spine with contrast. Sagittal T1, T2, STIR. Axial T1, T2. Post contrast Sagittal and axial T1 with fat saturation.    COMPARISON:  None    FINDINGS:  Vertebral body height is normal and alignment is maintained. Marrow signal is within normal limits. Abnormal T2 signal within the dorsal cord at the level T6-T7 consistent with a demyelinating plaque.  No additional abnormal cord signal.  Minimal flattening of cord substance anteriorly at the level of T4 could relate to a small intradural arachnoid cyst.  No definite abnormal cord signal in this region of cord remodeling.  No abnormal enhancement.  Intervertebral disc levels are as follows:    T1-T2 disc: No significant disc pathology. No spinal canal or neural foraminal stenosis.    T2-T3 disc: Normal disc space height.  Mild degenerative facet hypertrophy bilaterally with mild bilateral foraminal stenosis.    T3-T4 disc: Normal disc space height.  Minimal cord remodeling with slight  displacement anteriorly which may relate to an intradural arachnoid cyst or incidental variation.  Mild right-sided degenerative facet hypertrophy with mild right foraminal stenosis.  The thecal sac measures 12 mm AP.    T4-T5 disc: Normal disc space height.  Tiny anterior osteophytes.  Mild right-sided degenerative facet hypertrophy with minimal right foraminal stenosis.  No significant spinal stenosis.    T5-T6 disc: Normal disc space height with tiny anterior osteophytes.  Mild degenerative facet hypertrophy bilaterally.  No significant stenosis.    T6-T7 disc: Normal disc height with small anterior osteophytes.  No significant stenosis.    T7-T8 disc: Normal disc space height with small anterior osteophytes.  No significant stenosis.    T8-T9 disc: No significant disc pathology. No spinal canal or neural foraminal stenosis.    T9-T10 disc: No significant disc pathology. No spinal canal or neural foraminal stenosis.    T10-T11 disc: No significant disc pathology. No spinal canal or neural foraminal stenosis.    T11-T12 disc: No significant disc pathology. No spinal canal or neural foraminal stenosis.    T12-L1 disc: No significant disc pathology. No spinal canal or neural foraminal stenosis.      Impression 1. Abnormal T2 signal within the dorsal cord at the level of T6-T7 may relate to a demyelinating plaque.  No abnormal enhancement as may occur with active demyelination.  2. Degenerative findings of the mid upper thoracic spine as detailed above.  Mild foraminal stenosis at several levels.      Electronically signed by: Wade Ramirez Jr., MD  Date:    04/12/2019  Time:    14:46         Labs:     Lab Results   Component Value Date    BSBRHOVS20WV 56 02/15/2019    ORFGNWWV00VK 51 02/06/2018    GOYTOGBO56PS 58 02/09/2017     No results found for: JCVINDEX, JCVANTIBODY  Lab Results   Component Value Date    FI3KGIRA 73.9 09/21/2019    ABSOLUTECD3 1251 09/21/2019    ZP3IVLIM 16.1 09/21/2019    ABSOLUTECD8 272  09/21/2019    XA4XMJBW 57.3 (H) 09/21/2019    ABSOLUTECD4 970 09/21/2019    LABCD48 3.56 09/21/2019     Lab Results   Component Value Date    WBC 4.92 09/21/2019    RBC 4.46 09/21/2019    HGB 13.3 09/21/2019    HCT 42.7 09/21/2019    MCV 96 09/21/2019    MCH 29.8 09/21/2019    MCHC 31.1 (L) 09/21/2019    RDW 12.4 09/21/2019     09/21/2019    MPV 10.1 09/21/2019    GRAN 2.9 09/21/2019    GRAN 59.4 09/21/2019    LYMPH 1.5 09/21/2019    LYMPH 30.5 09/21/2019    MONO 0.4 09/21/2019    MONO 7.3 09/21/2019    EOS 0.1 09/21/2019    BASO 0.04 09/21/2019    EOSINOPHIL 1.8 09/21/2019    BASOPHIL 0.8 09/21/2019     Sodium   Date Value Ref Range Status   03/22/2019 139 136 - 145 mmol/L Final     Potassium   Date Value Ref Range Status   03/22/2019 4.5 3.5 - 5.1 mmol/L Final     Chloride   Date Value Ref Range Status   03/22/2019 103 95 - 110 mmol/L Final     CO2   Date Value Ref Range Status   03/22/2019 29 23 - 29 mmol/L Final     Glucose   Date Value Ref Range Status   03/22/2019 94 70 - 110 mg/dL Final     BUN, Bld   Date Value Ref Range Status   03/22/2019 15 6 - 20 mg/dL Final     Creatinine   Date Value Ref Range Status   03/22/2019 0.8 0.5 - 1.4 mg/dL Final     Calcium   Date Value Ref Range Status   03/22/2019 9.7 8.7 - 10.5 mg/dL Final     Total Protein   Date Value Ref Range Status   09/21/2019 7.2 6.0 - 8.4 g/dL Final     Albumin   Date Value Ref Range Status   09/21/2019 4.1 3.5 - 5.2 g/dL Final     Total Bilirubin   Date Value Ref Range Status   09/21/2019 0.6 0.1 - 1.0 mg/dL Final     Comment:     For infants and newborns, interpretation of results should be based  on gestational age, weight and in agreement with clinical  observations.  Premature Infant recommended reference ranges:  Up to 24 hours.............<8.0 mg/dL  Up to 48 hours............<12.0 mg/dL  3-5 days..................<15.0 mg/dL  6-29 days.................<15.0 mg/dL       Alkaline Phosphatase   Date Value Ref Range Status   09/21/2019  74 55 - 135 U/L Final     AST   Date Value Ref Range Status   09/21/2019 19 10 - 40 U/L Final     ALT   Date Value Ref Range Status   09/21/2019 19 10 - 44 U/L Final     Anion Gap   Date Value Ref Range Status   03/22/2019 7 (L) 8 - 16 mmol/L Final     eGFR if    Date Value Ref Range Status   03/22/2019 >60.0 >60 mL/min/1.73 m^2 Final     eGFR if non    Date Value Ref Range Status   03/22/2019 >60.0 >60 mL/min/1.73 m^2 Final     Comment:     Calculation used to obtain the estimated glomerular filtration  rate (eGFR) is the CKD-EPI equation.        No results found for: HEPBSAG, HEPBSAB, HEPBCAB        MS Impression and Plan:     NEURO MULTIPLE SCLEROSIS IMPRESSION:   MS Status:     Number of relapses in the past year?:  0    Clinical Progression:  Clinically Stable    MRI Progression:  Stable    MRI Progression comment:  Stable 4/2019  Plan:     DMT:  No change in management    DMT comment:  Continue Tecfidera and high dose Vit D3    Symptom Management:  No change in symptom management     Next Imaging Due: 4/16/2021     Next Labs Due: 8/3/2020       Will change MRI schedule to every other year and plan for MRI Brain in April 2021  Due to COVID-19 will delay safety labs until August(in general her labs have remained stable)    Our visit today lasted 21 minutes, and 100% of this time was spent face to face with the patient. Over 50% of this visit included discussion of the treatment plan/medication changes/symptom management/exam findings/imaging results/coordination of care. The patient agrees with the plan of care.    Problem List Items Addressed This Visit        Neuro    Multiple sclerosis - Primary (Chronic)    Relevant Orders    CBC auto differential    [place]-Suppressor Ratio    Hepatic function panel    Vitamin D    Migraine       Other    Counseling regarding goals of care      Other Visit Diagnoses     Vitamin D insufficiency        Relevant Orders    Vitamin D    Neuropathic  pain        Fibromyalgia            The patient location is: home  The chief complaint leading to consultation is: MS follow up  Visit type: audiovisual  Total time spent with patient: 21 minutes  Each patient to whom he or she provides medical services by telemedicine is:  (1) informed of the relationship between the physician and patient and the respective role of any other health care provider with respect to management of the patient; and (2) notified that he or she may decline to receive medical services by telemedicine and may withdraw from such care at any time.      Follow up in about 6 months (around 10/16/2020) for follow up with me.  Patient agreed to POC today.    Attending, Dr. Matthews, was available during today's encounter.     Shakira West PA-C  [facility]

## 2020-04-16 ENCOUNTER — OFFICE VISIT (OUTPATIENT)
Dept: NEUROLOGY | Facility: CLINIC | Age: 50
End: 2020-04-16
Payer: COMMERCIAL

## 2020-04-16 ENCOUNTER — PATIENT MESSAGE (OUTPATIENT)
Dept: NEUROLOGY | Facility: CLINIC | Age: 50
End: 2020-04-16

## 2020-04-16 DIAGNOSIS — E55.9 VITAMIN D INSUFFICIENCY: ICD-10-CM

## 2020-04-16 DIAGNOSIS — M79.7 FIBROMYALGIA: ICD-10-CM

## 2020-04-16 DIAGNOSIS — G43.101 MIGRAINE WITH AURA AND WITH STATUS MIGRAINOSUS, NOT INTRACTABLE: ICD-10-CM

## 2020-04-16 DIAGNOSIS — G35 MULTIPLE SCLEROSIS: Primary | ICD-10-CM

## 2020-04-16 DIAGNOSIS — M79.2 NEUROPATHIC PAIN: ICD-10-CM

## 2020-04-16 DIAGNOSIS — Z71.89 COUNSELING REGARDING GOALS OF CARE: ICD-10-CM

## 2020-04-16 PROCEDURE — 99213 PR OFFICE/OUTPT VISIT, EST, LEVL III, 20-29 MIN: ICD-10-PCS | Mod: 95,,, | Performed by: PHYSICIAN ASSISTANT

## 2020-04-16 PROCEDURE — 99213 OFFICE O/P EST LOW 20 MIN: CPT | Mod: 95,,, | Performed by: PHYSICIAN ASSISTANT

## 2020-05-20 DIAGNOSIS — F51.01 PRIMARY INSOMNIA: ICD-10-CM

## 2020-05-20 RX ORDER — ZOLPIDEM TARTRATE 5 MG/1
TABLET ORAL
Qty: 90 TABLET | Refills: 1 | Status: SHIPPED | OUTPATIENT
Start: 2020-05-20 | End: 2020-12-03 | Stop reason: SDUPTHER

## 2020-06-25 ENCOUNTER — PATIENT MESSAGE (OUTPATIENT)
Dept: NEUROLOGY | Facility: CLINIC | Age: 50
End: 2020-06-25

## 2020-06-25 DIAGNOSIS — F32.A DEPRESSION, UNSPECIFIED DEPRESSION TYPE: Primary | ICD-10-CM

## 2020-06-25 RX ORDER — FLUOXETINE 10 MG/1
10 TABLET ORAL DAILY
Qty: 30 TABLET | Refills: 1 | Status: SHIPPED | OUTPATIENT
Start: 2020-06-25 | End: 2020-10-16 | Stop reason: SDUPTHER

## 2020-06-29 ENCOUNTER — OFFICE VISIT (OUTPATIENT)
Dept: PSYCHIATRY | Facility: CLINIC | Age: 50
End: 2020-06-29
Payer: COMMERCIAL

## 2020-06-29 DIAGNOSIS — F43.23 ADJUSTMENT DISORDER WITH MIXED ANXIETY AND DEPRESSED MOOD: ICD-10-CM

## 2020-06-29 PROCEDURE — 99499 UNLISTED E&M SERVICE: CPT | Mod: 95,,, | Performed by: SOCIAL WORKER

## 2020-06-29 PROCEDURE — 99499 NO LOS: ICD-10-PCS | Mod: 95,,, | Performed by: SOCIAL WORKER

## 2020-06-29 NOTE — PROGRESS NOTES
Psychiatry Initial Visit (PhD/LCSW)  Diagnostic Interview - NOT A BILLABLE VISIT    Date: 6/29/2020    Site: [facility]  The patient location is: [place]  The chief complaint leading to consultation is: anxiety    Visit type: audiovisual    Face to Face time with patient: 49 minutes  49 minutes of total time spent on the encounter, which includes face to face time and non-face to face time preparing to see the patient (eg, review of tests), Obtaining and/or reviewing separately obtained history, Documenting clinical information in the electronic or other health record, Independently interpreting results (not separately reported) and communicating results to the patient/family/caregiver, or Care coordination (not separately reported).     Each patient to whom he or she provides medical services by telemedicine is:  (1) informed of the relationship between the physician and patient and the respective role of any other health care provider with respect to management of the patient; and (2) notified that he or she may decline to receive medical services by telemedicine and may withdraw from such care at any time.    Referral source: Shakira West PA-C    Clinical status of patient: Outpatient    Kasandra Garcia, a 50 y.o. female, for initial evaluation visit.  Met with patient.    Chief complaint/reason for encounter: anxiety    History of present illness: Pt presents for counseling today following a request made to her MS provider, Shakira West PA-C, in which pt asked to be put back on Prozac to improve mood. Provider recommended counseling, and pt is here reluctantly. Pt expressed multiple current stressors: COVID-19 increase in cases, new rules at 's job about travelling during COVID-19, son's upcoming wedding. Pt admits to often feeling restless, racing thoughts, being easily fatigued, worsened difficulty concentrating, irritability, and muscle tension. Pt denies any sleep disturbance, currently  "taking Ambien with no reported sleep issues.   Pain: 0    Symptoms:   · Mood: depressed mood, fatigue and poor concentration  · Anxiety: excessive anxiety/worry, restlessness/keyed up, irritability and muscle tension  · Substance abuse: denied  · Cognitive functioning: denied  · Health behaviors: noncontributory    Psychiatric history: Pt previoulsy prescribed Prozac by medical provider for depression and anxiety.   Pt stopped taking because it made her "numb" (unable to cry or really feel anything).    Medical history: Pt has multiple sclerosis and fibromyalgia.    Family history of psychiatric illness: none    Social history (marriage, employment, etc.): Pt is , with two children and one grandchild. Both parents are still living and , and all members of the family live close to the pt. 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 She has several friends with whom she regularly talks with, but doesn't see in person often. 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 She attended [employer], and graduated with a BSN. Pt said that her family had been expressing to her that she was becoming more and more irritable in the last 2-3 months, but did not believe it was an issue until she was becoming irritated with her daughter's 2 year-old son, whom she babysits while her daughter () is working.       Substance use:   Alcohol: social   Drugs: none   Tobacco: none   Caffeine: none    Current medications and drug reactions (include OTC, herbal): see medication list    Strengths and liabilities: Strength: Patient is intelligent., Strength: Patient has positive support network., Strength: Patient has reasonable judgment., Strength: Patient is stable.    Current Evaluation:     Mental Status Exam:  General Appearance:  unremarkable, age appropriate   Speech: normal tone, normal rate, normal pitch, normal volume      Level of Cooperation: cooperative    "   Thought Processes: normal and logical   Mood: irritable      Thought Content: normal, no suicidality, no homicidality, delusions, or paranoia   Affect: congruent and appropriate   Orientation: Oriented x3   Memory: intact   Attention Span & Concentration: intact   Fund of General Knowledge: intact and appropriate to age and level of education   Abstract Reasoning: intact   Judgment & Insight: intact     Language  intact     Diagnostic Impression - Plan:       ICD-10-CM ICD-9-CM   1. Adjustment disorder with mixed anxiety and depressed mood  F43.23 309.28       Plan:medication management by physician, Pt is not interested in counseling at this time, was advised she can call in the future if services are needed or desired.      Return to Clinic: as needed    Length of Service (minutes): 45

## 2020-07-21 ENCOUNTER — TELEPHONE (OUTPATIENT)
Dept: NEUROLOGY | Facility: CLINIC | Age: 50
End: 2020-07-21

## 2020-07-21 ENCOUNTER — OFFICE VISIT (OUTPATIENT)
Dept: NEUROLOGY | Facility: CLINIC | Age: 50
End: 2020-07-21
Payer: COMMERCIAL

## 2020-07-21 VITALS — WEIGHT: 185 LBS | BODY MASS INDEX: 29.86 KG/M2

## 2020-07-21 DIAGNOSIS — M79.7 FIBROMYALGIA: ICD-10-CM

## 2020-07-21 DIAGNOSIS — G35 MULTIPLE SCLEROSIS: Chronic | ICD-10-CM

## 2020-07-21 DIAGNOSIS — Z71.89 COUNSELING REGARDING GOALS OF CARE: ICD-10-CM

## 2020-07-21 DIAGNOSIS — G89.0 CENTRAL PAIN SYNDROME: ICD-10-CM

## 2020-07-21 DIAGNOSIS — F32.A DEPRESSION, UNSPECIFIED DEPRESSION TYPE: Primary | ICD-10-CM

## 2020-07-21 DIAGNOSIS — N31.9 NEUROGENIC BLADDER: ICD-10-CM

## 2020-07-21 PROCEDURE — 99213 OFFICE O/P EST LOW 20 MIN: CPT | Mod: 95,,, | Performed by: PHYSICIAN ASSISTANT

## 2020-07-21 PROCEDURE — 3008F BODY MASS INDEX DOCD: CPT | Mod: CPTII,,, | Performed by: PHYSICIAN ASSISTANT

## 2020-07-21 PROCEDURE — 99213 PR OFFICE/OUTPT VISIT, EST, LEVL III, 20-29 MIN: ICD-10-PCS | Mod: 95,,, | Performed by: PHYSICIAN ASSISTANT

## 2020-07-21 PROCEDURE — 3008F PR BODY MASS INDEX (BMI) DOCUMENTED: ICD-10-PCS | Mod: CPTII,,, | Performed by: PHYSICIAN ASSISTANT

## 2020-07-21 RX ORDER — FLUOXETINE HYDROCHLORIDE 20 MG/1
20 CAPSULE ORAL DAILY
Qty: 90 CAPSULE | Refills: 0 | Status: SHIPPED | OUTPATIENT
Start: 2020-07-21 | End: 2020-10-16 | Stop reason: SDUPTHER

## 2020-07-21 NOTE — Clinical Note
F/u in clinic on Friday with me in 3 months  Would like to get her MRI rescheduled and have done before her appt

## 2020-07-21 NOTE — TELEPHONE ENCOUNTER
----- Message from Shakira West PA-C sent at 7/21/2020  9:36 AM CDT -----  F/u in clinic on Friday with me in 3 monthsWould like to get her MRI rescheduled and have done before her appt

## 2020-07-21 NOTE — PROGRESS NOTES
Subjective:          Patient ID: Kasandra Garcia is a 50 y.o. female who presents today for a routine video visit for MS.      MS HPI:  · DMT: dimethyl fumarate  · Side effects from DMT? No  · Taking vitamin D3 as recommended? Yes - alternating 5,000IU and 10,000IU/d   · Check in regarding mood-recently started Prozac 10mg(6/25/2020)-does not feel it has been beneficial for her at this point. No adverse side effects  · Recently saw her physician who manages her fibromyalgia and it was suggested that Prozac in higher doses may be beneficial for her fibro as well  · One counseling session with Tu Thompson( Intern)  · Safety Labs scheduled for August in [place]  · Feels stable from a MS perspective with no new neurological changes    The patient location is: home([place])  The chief complaint leading to consultation is: mood    Visit type: audiovisual    Face to Face time with patient: 15 minutes  20 minutes of total time spent on the encounter, which includes face to face time and non-face to face time preparing to see the patient (eg, review of tests), Obtaining and/or reviewing separately obtained history, Documenting clinical information in the electronic or other health record, Independently interpreting results (not separately reported) and communicating results to the patient/family/caregiver, or Care coordination (not separately reported).         Each patient to whom he or she provides medical services by telemedicine is:  (1) informed of the relationship between the physician and patient and the respective role of any other health care provider with respect to management of the patient; and (2) notified that he or she may decline to receive medical services by telemedicine and may withdraw from such care at any time.    Notes:       Medications: reviewed with patient   Current Outpatient Medications   Medication Sig    cholecalciferol, vitamin D3, (VITAMIN D3) 5,000 unit Tab Take 5,000 Units by mouth  once daily. Alternate 5000 IU and 10,000 IU every other day.    cranberry extract 500 mg Cap Take by mouth.    estradiol 0.05 mg/24 hr td ptsw (VIVELLE-DOT) 0.05 mg/24 hr Place onto the skin.    FLUoxetine 10 MG Tab Take 1 tablet (10 mg total) by mouth once daily.    fluticasone propionate (FLONASE) 50 mcg/actuation nasal spray fluticasone propionate 50 mcg/actuation nasal spray,suspension    loratadine (CLARITIN ORAL) Claritin    vx-weouyxenswygxrtr-P48-[id number] (RHEUMATE) 1-1-500 mg Cap Rheumate 1 mg-1 mg-500 mg capsule   TAKE ONE CAPSULE BY MOUTH DAILY    milnacipran (SAVELLA) 25 mg Tab tablet Take 25 mg by mouth.    nystatin-triamcinolone (MYCOLOG II) cream Apply topically as needed.     oxybutynin (DITROPAN) 5 MG Tab     pregabalin (LYRICA) 100 MG capsule Take 100 mg by mouth 2 (two) times daily.    rizatriptan (MAXALT) 10 MG tablet TAKE 1 TABLET DAILY AS     NEEDED    tamsulosin (FLOMAX) 0.4 mg Cap tamsulosin 0.4 mg capsule   TAKE 1 CAPSULE BY MOUTH AT BEDTIME    TECFIDERA 240 mg CpDR TAKE ONE CAPSULE BY MOUTH TWICE DAILY. STORE IN ORIGINAL CONTAINER AT ROOM TEMPERATURE.    zolpidem (AMBIEN) 5 MG Tab TAKE 1/2 TO 1 TABLET       NIGHTLY     No current facility-administered medications for this visit.        SOCIAL HISTORY  Social History     Tobacco Use    Smoking status: Never Smoker    Smokeless tobacco: Never Used   Substance Use Topics    Alcohol use: No     Alcohol/week: 0.0 standard drinks    Drug use: No       Living arrangements - the patient lives with their family.    ROS:  As Above             Objective:        1. 25 foot timed walk:  Timed 25 Foot Walk: 8/8/2017 10/2/2019   Did patient wear an AFO? No No   Was assistive device used? No No   Time for 25 Foot Walk (seconds) 3.4 3.05   Time for 25 Foot Walk (seconds) - 3.45         Neurologic Exam  Deferred today    Imaging:       Results for orders placed during the hospital encounter of 04/12/19   MRI Brain W WO Contrast    Narrative  EXAMINATION:  MRI BRAIN W WO CONTRAST    CLINICAL HISTORY:  Multiple sclerosisMS worsening symptoms;    TECHNIQUE:  Sagittal T1, T2 FLAIR.  Axial T1, T2, T2 FLAIR, DWI.  Axial and coronal T1 post contrast.    COMPARISON:  Previous MRI examinations of the brain from July 27, 2018 and July 29, 2016 were reviewed.    FINDINGS:  There is no restricted diffusion. Again demonstrated are multiple elliptical shaped foci of T2 FLAIR hyperintensity that radiate outward from the bodies of the lateral ventricles consistent with demyelinating plaques of multiple sclerosis.  Mild T2 FLAIR hyperintense signal about the temporal horn of the left lateral ventricle laterally.  Several of the lesions demonstrate central volume loss such as within the mid left corona radiata.  No abnormal enhancement.  These findings are unchanged compared to the prior 2 studies.  No knew such T2 FLAIR hyperintense lesions.    The ventricles and sulci are normal in size and configuration. Midline structures are normal. There are preserved arterial flow-voids on T2 weighted imaging. The paranasal sinuses and mastoid air cells are clear.    No abnormal marrow signal is identified.      Impression Persistent findings consistent with demyelinating disease and multiple sclerosis.  No abnormal enhancement as may occur with active demyelination.  No significant change compared to the prior to MRI examinations of the brain dating back to July 2016.      Electronically signed by: Wade Ramirez Jr., MD  Date:    04/12/2019  Time:    14:20         Labs:     Lab Results   Component Value Date    [id number] 56 02/15/2019    LCXCMVDM15MR 51 02/06/2018    MBEZJJFU98HC 58 02/09/2017     No results found for: JCVINDEX, JCVANTIBODY  Lab Results   Component Value Date    NS7TPRQW 73.9 09/21/2019    ABSOLUTECD3 1251 09/21/2019    CD1BXEKM 16.1 09/21/2019    ABSOLUTECD8 272 09/21/2019    PH2MZERI 57.3 (H) 09/21/2019    ABSOLUTECD4 970 09/21/2019    LABCD48 3.56 09/21/2019      Lab Results   Component Value Date    WBC 4.92 09/21/2019    RBC 4.46 09/21/2019    HGB 13.3 09/21/2019    HCT 42.7 09/21/2019    MCV 96 09/21/2019    MCH 29.8 09/21/2019    MCHC 31.1 (L) 09/21/2019    RDW 12.4 09/21/2019     09/21/2019    MPV 10.1 09/21/2019    GRAN 2.9 09/21/2019    GRAN 59.4 09/21/2019    LYMPH 1.5 09/21/2019    LYMPH 30.5 09/21/2019    MONO 0.4 09/21/2019    MONO 7.3 09/21/2019    EOS 0.1 09/21/2019    BASO 0.04 09/21/2019    EOSINOPHIL 1.8 09/21/2019    BASOPHIL 0.8 09/21/2019     Sodium   Date Value Ref Range Status   03/22/2019 139 136 - 145 mmol/L Final     Potassium   Date Value Ref Range Status   03/22/2019 4.5 3.5 - 5.1 mmol/L Final     Chloride   Date Value Ref Range Status   03/22/2019 103 95 - 110 mmol/L Final     CO2   Date Value Ref Range Status   03/22/2019 29 23 - 29 mmol/L Final     Glucose   Date Value Ref Range Status   03/22/2019 94 70 - 110 mg/dL Final     BUN, Bld   Date Value Ref Range Status   03/22/2019 15 6 - 20 mg/dL Final     Creatinine   Date Value Ref Range Status   03/22/2019 0.8 0.5 - 1.4 mg/dL Final     Calcium   Date Value Ref Range Status   03/22/2019 9.7 8.7 - 10.5 mg/dL Final     Total Protein   Date Value Ref Range Status   09/21/2019 7.2 6.0 - 8.4 g/dL Final     Albumin   Date Value Ref Range Status   09/21/2019 4.1 3.5 - 5.2 g/dL Final     Total Bilirubin   Date Value Ref Range Status   09/21/2019 0.6 0.1 - 1.0 mg/dL Final     Comment:     For infants and newborns, interpretation of results should be based  on gestational age, weight and in agreement with clinical  observations.  Premature Infant recommended reference ranges:  Up to 24 hours.............<8.0 mg/dL  Up to 48 hours............<12.0 mg/dL  3-5 days..................<15.0 mg/dL  6-29 days.................<15.0 mg/dL       Alkaline Phosphatase   Date Value Ref Range Status   09/21/2019 74 55 - 135 U/L Final     AST   Date Value Ref Range Status   09/21/2019 19 10 - 40 U/L Final     ALT    Date Value Ref Range Status   09/21/2019 19 10 - 44 U/L Final     Anion Gap   Date Value Ref Range Status   03/22/2019 7 (L) 8 - 16 mmol/L Final     eGFR if    Date Value Ref Range Status   03/22/2019 >60.0 >60 mL/min/1.73 m^2 Final     eGFR if non    Date Value Ref Range Status   03/22/2019 >60.0 >60 mL/min/1.73 m^2 Final     Comment:     Calculation used to obtain the estimated glomerular filtration  rate (eGFR) is the CKD-EPI equation.        No results found for: HEPBSAG, HEPBSAB, HEPBCAB        MS Impression and Plan:     NEURO MULTIPLE SCLEROSIS IMPRESSION:   MS Status:     Number of relapses in the past year?:  0    Clinical Progression:  Clinically Stable  Plan:     DMT:  No change in management    DMT comment:  Continue Tecfidera and high dose Vit D3. Safety labs in August scheduled    Symptom Management:  Implement change in symptom management    Implement Change in Symptom Management:  Mood (Increase Prozac to 20mg, continued to encourage counseling)     Next Imaging Due: 4/1/2021     Next Labs Due: 8/21/2020    Our visit today lasted 20 total minutes(see above for face to face vs total).  Over 50% of this visit included discussion of the treatment plan/medication changes/symptom management/exam findings/imaging /coordination of care.     Problem List Items Addressed This Visit        Neuro    Multiple sclerosis (Chronic)    Central pain syndrome       Renal/    Neurogenic bladder       Other    Counseling regarding goals of care      Other Visit Diagnoses     Depression, unspecified depression type    -  Primary    Relevant Medications    FLUoxetine 20 MG capsule    Fibromyalgia              Follow up in about 3 months (around 10/21/2020) for follow up with me.  Patient agreed to POC today.    Attending, Dr. Matthews, was available during today's encounter.     Shakira West PA-C  [facility]

## 2020-08-14 ENCOUNTER — LAB VISIT (OUTPATIENT)
Dept: LAB | Facility: HOSPITAL | Age: 50
End: 2020-08-14
Attending: ANESTHESIOLOGY
Payer: COMMERCIAL

## 2020-08-14 DIAGNOSIS — E55.9 VITAMIN D INSUFFICIENCY: ICD-10-CM

## 2020-08-14 DIAGNOSIS — G35 MULTIPLE SCLEROSIS: ICD-10-CM

## 2020-08-14 LAB
ALBUMIN SERPL BCP-MCNC: 4.1 G/DL (ref 3.5–5.2)
ALP SERPL-CCNC: 82 U/L (ref 55–135)
ALT SERPL W/O P-5'-P-CCNC: 10 U/L (ref 10–44)
AST SERPL-CCNC: 14 U/L (ref 10–40)
BILIRUB DIRECT SERPL-MCNC: 0.2 MG/DL (ref 0.1–0.3)
BILIRUB SERPL-MCNC: 0.5 MG/DL (ref 0.1–1)
PROT SERPL-MCNC: 7.2 G/DL (ref 6–8.4)

## 2020-08-14 PROCEDURE — 80076 HEPATIC FUNCTION PANEL: CPT

## 2020-08-14 PROCEDURE — 86359 T CELLS TOTAL COUNT: CPT

## 2020-08-14 PROCEDURE — 36415 COLL VENOUS BLD VENIPUNCTURE: CPT

## 2020-08-14 PROCEDURE — 86360 T CELL ABSOLUTE COUNT/RATIO: CPT

## 2020-08-14 PROCEDURE — 82306 VITAMIN D 25 HYDROXY: CPT

## 2020-08-14 PROCEDURE — 85025 COMPLETE CBC W/AUTO DIFF WBC: CPT

## 2020-08-15 LAB
25(OH)D3+25(OH)D2 SERPL-MCNC: 60 NG/ML (ref 30–96)
BASOPHILS # BLD AUTO: 0.04 K/UL (ref 0–0.2)
BASOPHILS NFR BLD: 0.6 % (ref 0–1.9)
DIFFERENTIAL METHOD: ABNORMAL
EOSINOPHIL # BLD AUTO: 0.1 K/UL (ref 0–0.5)
EOSINOPHIL NFR BLD: 0.8 % (ref 0–8)
ERYTHROCYTE [DISTWIDTH] IN BLOOD BY AUTOMATED COUNT: 12.8 % (ref 11.5–14.5)
HCT VFR BLD AUTO: 41.6 % (ref 37–48.5)
HGB BLD-MCNC: 13.1 G/DL (ref 12–16)
IMM GRANULOCYTES # BLD AUTO: 0.02 K/UL (ref 0–0.04)
IMM GRANULOCYTES NFR BLD AUTO: 0.3 % (ref 0–0.5)
LYMPHOCYTES # BLD AUTO: 1.6 K/UL (ref 1–4.8)
LYMPHOCYTES NFR BLD: 22.8 % (ref 18–48)
MCH RBC QN AUTO: 30.2 PG (ref 27–31)
MCHC RBC AUTO-ENTMCNC: 31.5 G/DL (ref 32–36)
MCV RBC AUTO: 96 FL (ref 82–98)
MONOCYTES # BLD AUTO: 0.5 K/UL (ref 0.3–1)
MONOCYTES NFR BLD: 6.4 % (ref 4–15)
NEUTROPHILS # BLD AUTO: 4.9 K/UL (ref 1.8–7.7)
NEUTROPHILS NFR BLD: 69.1 % (ref 38–73)
NRBC BLD-RTO: 0 /100 WBC
PLATELET # BLD AUTO: 341 K/UL (ref 150–350)
PMV BLD AUTO: 10.5 FL (ref 9.2–12.9)
RBC # BLD AUTO: 4.34 M/UL (ref 4–5.4)
WBC # BLD AUTO: 7.06 K/UL (ref 3.9–12.7)

## 2020-08-17 LAB
ABSOLUTE CD3: 1323 CELLS/UL (ref 700–2100)
ABSOLUTE CD8: 275 CELLS/UL (ref 200–900)
CD3%: 72 % (ref 55–83)
CD3+CD4+ CELLS # BLD: 1037 CELLS/UL (ref 300–1400)
CD3+CD4+ CELLS NFR BLD: 56.4 % (ref 28–57)
CD4/CD8 RATIO: 3.77 (ref 0.9–3.6)
CD8 % SUPPRESSOR T CELL: 15 % (ref 10–39)

## 2020-09-28 ENCOUNTER — PATIENT MESSAGE (OUTPATIENT)
Dept: NEUROLOGY | Facility: CLINIC | Age: 50
End: 2020-09-28

## 2020-09-28 DIAGNOSIS — G43.101 MIGRAINE WITH AURA AND WITH STATUS MIGRAINOSUS, NOT INTRACTABLE: ICD-10-CM

## 2020-09-28 RX ORDER — RIZATRIPTAN BENZOATE 10 MG/1
10 TABLET ORAL DAILY PRN
Qty: 15 TABLET | Refills: 1 | Status: SHIPPED | OUTPATIENT
Start: 2020-09-28 | End: 2020-11-16 | Stop reason: SDUPTHER

## 2020-10-16 ENCOUNTER — LAB VISIT (OUTPATIENT)
Dept: LAB | Facility: HOSPITAL | Age: 50
End: 2020-10-16
Payer: COMMERCIAL

## 2020-10-16 ENCOUNTER — OFFICE VISIT (OUTPATIENT)
Dept: NEUROLOGY | Facility: CLINIC | Age: 50
End: 2020-10-16
Payer: COMMERCIAL

## 2020-10-16 VITALS
HEART RATE: 57 BPM | DIASTOLIC BLOOD PRESSURE: 83 MMHG | WEIGHT: 184.5 LBS | BODY MASS INDEX: 29.65 KG/M2 | TEMPERATURE: 98 F | SYSTOLIC BLOOD PRESSURE: 130 MMHG | HEIGHT: 66 IN

## 2020-10-16 DIAGNOSIS — N31.9 NEUROGENIC BLADDER: ICD-10-CM

## 2020-10-16 DIAGNOSIS — F32.A DEPRESSION, UNSPECIFIED DEPRESSION TYPE: ICD-10-CM

## 2020-10-16 DIAGNOSIS — Z79.899 IMMUNOSUPPRESSION DUE TO DRUG THERAPY: ICD-10-CM

## 2020-10-16 DIAGNOSIS — D84.821 IMMUNOSUPPRESSION DUE TO DRUG THERAPY: ICD-10-CM

## 2020-10-16 DIAGNOSIS — G35 MULTIPLE SCLEROSIS: Primary | ICD-10-CM

## 2020-10-16 DIAGNOSIS — Z79.899 ENCOUNTER FOR LONG-TERM (CURRENT) USE OF HIGH-RISK MEDICATION: ICD-10-CM

## 2020-10-16 DIAGNOSIS — G35 MULTIPLE SCLEROSIS: ICD-10-CM

## 2020-10-16 DIAGNOSIS — Z71.89 COUNSELING REGARDING GOALS OF CARE: ICD-10-CM

## 2020-10-16 DIAGNOSIS — F51.04 PSYCHOPHYSIOLOGICAL INSOMNIA: ICD-10-CM

## 2020-10-16 DIAGNOSIS — M79.2 NEUROPATHIC PAIN: ICD-10-CM

## 2020-10-16 DIAGNOSIS — M79.7 FIBROMYALGIA: ICD-10-CM

## 2020-10-16 LAB
BASOPHILS # BLD AUTO: 0.03 K/UL (ref 0–0.2)
BASOPHILS NFR BLD: 0.6 % (ref 0–1.9)
DIFFERENTIAL METHOD: NORMAL
EOSINOPHIL # BLD AUTO: 0.1 K/UL (ref 0–0.5)
EOSINOPHIL NFR BLD: 1.1 % (ref 0–8)
ERYTHROCYTE [DISTWIDTH] IN BLOOD BY AUTOMATED COUNT: 12.8 % (ref 11.5–14.5)
HCT VFR BLD AUTO: 41.3 % (ref 37–48.5)
HGB BLD-MCNC: 13.5 G/DL (ref 12–16)
IGA SERPL-MCNC: 417 MG/DL (ref 40–350)
IGG SERPL-MCNC: 1003 MG/DL (ref 650–1600)
IGM SERPL-MCNC: 62 MG/DL (ref 50–300)
IMM GRANULOCYTES # BLD AUTO: 0.01 K/UL (ref 0–0.04)
IMM GRANULOCYTES NFR BLD AUTO: 0.2 % (ref 0–0.5)
LYMPHOCYTES # BLD AUTO: 1.4 K/UL (ref 1–4.8)
LYMPHOCYTES NFR BLD: 29.3 % (ref 18–48)
MCH RBC QN AUTO: 30.6 PG (ref 27–31)
MCHC RBC AUTO-ENTMCNC: 32.7 G/DL (ref 32–36)
MCV RBC AUTO: 94 FL (ref 82–98)
MONOCYTES # BLD AUTO: 0.3 K/UL (ref 0.3–1)
MONOCYTES NFR BLD: 7.1 % (ref 4–15)
NEUTROPHILS # BLD AUTO: 2.9 K/UL (ref 1.8–7.7)
NEUTROPHILS NFR BLD: 61.7 % (ref 38–73)
NRBC BLD-RTO: 0 /100 WBC
PLATELET # BLD AUTO: 340 K/UL (ref 150–350)
PMV BLD AUTO: 9.6 FL (ref 9.2–12.9)
RBC # BLD AUTO: 4.41 M/UL (ref 4–5.4)
WBC # BLD AUTO: 4.67 K/UL (ref 3.9–12.7)

## 2020-10-16 PROCEDURE — 99215 PR OFFICE/OUTPT VISIT, EST, LEVL V, 40-54 MIN: ICD-10-PCS | Mod: S$GLB,,, | Performed by: PHYSICIAN ASSISTANT

## 2020-10-16 PROCEDURE — 85025 COMPLETE CBC W/AUTO DIFF WBC: CPT

## 2020-10-16 PROCEDURE — 3008F PR BODY MASS INDEX (BMI) DOCUMENTED: ICD-10-PCS | Mod: CPTII,S$GLB,, | Performed by: PHYSICIAN ASSISTANT

## 2020-10-16 PROCEDURE — 82784 ASSAY IGA/IGD/IGG/IGM EACH: CPT | Mod: 59

## 2020-10-16 PROCEDURE — 99215 OFFICE O/P EST HI 40 MIN: CPT | Mod: S$GLB,,, | Performed by: PHYSICIAN ASSISTANT

## 2020-10-16 PROCEDURE — 36415 COLL VENOUS BLD VENIPUNCTURE: CPT

## 2020-10-16 PROCEDURE — 99999 PR PBB SHADOW E&M-EST. PATIENT-LVL IV: ICD-10-PCS | Mod: PBBFAC,,, | Performed by: PHYSICIAN ASSISTANT

## 2020-10-16 PROCEDURE — 3008F BODY MASS INDEX DOCD: CPT | Mod: CPTII,S$GLB,, | Performed by: PHYSICIAN ASSISTANT

## 2020-10-16 PROCEDURE — 99999 PR PBB SHADOW E&M-EST. PATIENT-LVL IV: CPT | Mod: PBBFAC,,, | Performed by: PHYSICIAN ASSISTANT

## 2020-10-16 RX ORDER — FLUOXETINE HYDROCHLORIDE 20 MG/1
20 CAPSULE ORAL DAILY
Qty: 90 CAPSULE | Refills: 0 | Status: SHIPPED | OUTPATIENT
Start: 2020-10-16 | End: 2021-01-15

## 2020-10-16 RX ORDER — FLUOCINONIDE 0.5 MG/G
OINTMENT TOPICAL
COMMUNITY
Start: 2020-10-12 | End: 2021-11-22

## 2020-10-16 RX ORDER — FLUOXETINE 10 MG/1
10 TABLET ORAL DAILY
Qty: 90 TABLET | Refills: 0 | Status: SHIPPED | OUTPATIENT
Start: 2020-10-16 | End: 2021-01-15

## 2020-10-16 NOTE — PROGRESS NOTES
"Subjective:          Patient ID: Kasandra Garcia is a 50 y.o. female who presents today for a routine clinic visit for MS.  Last visit to [facility] in July 2020 with this provider.     MS HPI:  · DMT: dimethyl fumarate  · Side effects from DMT? No  · Taking vitamin D3 as recommended? Yes - alternating 5,000 and 10,000IU/d   · She is having dental issues at present due to "autoimmune reaction"-fighting off/on for years(about 5)-wonders if any of this has to do with Tecfidera  · Urologist =Dr. Terrell(took over after Dr. Yeager retired). Having about 3-4 UTIs a year  · From MS standpoint she feels stable.    Medications:  Current Outpatient Medications   Medication Sig    cholecalciferol, vitamin D3, (VITAMIN D3) 5,000 unit Tab Take 5,000 Units by mouth once daily. Alternate 5000 IU and 10,000 IU every other day.    cranberry extract 500 mg Cap Take by mouth.    estradiol 0.05 mg/24 hr td ptsw (VIVELLE-DOT) 0.05 mg/24 hr Place onto the skin.    FLUoxetine 10 MG Tab Take 1 tablet (10 mg total) by mouth once daily.    FLUoxetine 20 MG capsule Take 1 capsule (20 mg total) by mouth once daily.    fluticasone propionate (FLONASE) 50 mcg/actuation nasal spray fluticasone propionate 50 mcg/actuation nasal spray,suspension    cz-fcscwoxbpiwqschf-C36-hrb236 (RHEUMATE) 1-1-500 mg Cap Rheumate 1 mg-1 mg-500 mg capsule   TAKE ONE CAPSULE BY MOUTH DAILY    oxybutynin (DITROPAN) 5 MG Tab     pregabalin (LYRICA) 100 MG capsule Take 100 mg by mouth 2 (two) times daily.    rizatriptan (MAXALT) 10 MG tablet Take 1 tablet (10 mg total) by mouth daily as needed.    tamsulosin (FLOMAX) 0.4 mg Cap tamsulosin 0.4 mg capsule   TAKE 1 CAPSULE BY MOUTH AT BEDTIME    TECFIDERA 240 mg CpDR TAKE ONE CAPSULE BY MOUTH TWICE DAILY. STORE IN ORIGINAL CONTAINER AT ROOM TEMPERATURE.    zolpidem (AMBIEN) 5 MG Tab TAKE 1/2 TO 1 TABLET       NIGHTLY     No current facility-administered medications for this visit.        SOCIAL " HISTORY  Social History     Tobacco Use    Smoking status: Never Smoker    Smokeless tobacco: Never Used   Substance Use Topics    Alcohol use: No     Alcohol/week: 0.0 standard drinks    Drug use: No       Living arrangements - the patient lives with their family.    ROS:    REVIEW OF SYMPTOMS 10/14/2020   Do you feel abnormally tired on most days? Yes-has some good days and bad days   Do you feel you generally sleep well? No-due to dental issues she feels at the time. Does snore-wears mouth piece and has been unable to wear with dental issues.  Ambien 5mg nightly   Do you have difficulty controlling your bladder?  No   Do you have difficulty controlling your bowels?  No   Do you have frequent muscle cramps, tightness or spasms in your limbs?  No   Do you have new visual symptoms?  No   Do you have worsening difficulty with your memory or thinking? No   Do you have worsening symptoms of anxiety or depression?  No-Prozac 30mg-she feels Prozac has been helpful for her   For patients who walk, Do you have more difficulty walking?  No   Have you fallen since your last visit?  No   For patients who use wheelchairs: Do you have any skin wounds or breakdown? Not Applicable   Do you have difficulty using your hands?  No   Do you have shooting or burning pain? Yes-burning primarily trunk -Lyrica BID helps   Do you have difficulty with sexual function?  No   If you are sexually active, are you using birth control? Y/N  N/A No   Do you often choke when swallowing liquids or solid food?  No   Do you experience worsening symptoms when overheated? Yes   Do you need any new equipment such as a wheelchair, walker or shower chair? No   Do you receive co-pay financial assistance for your principal MS medicine? Yes   Would you be interested in participating in an MS research trial in the future? No   For patients on Gilenya, Tecfidera, Aubagio, Rituxan, Ocrevus, Tysabri, Lemtrada or Methotrexate, are you aware that you should NOT  receive live virus vaccines?  Yes   Do you feel you have adequate family/friend support?  Yes   Do you have health insurance?   Yes   Are you currently employed? No   Do you receive SSDI/SSI?  No   Do you use marijuana or cannabis products? No   Have you been diagnosed with a urinary tract infection since your last visit here? Yes   Have you been diagnosed with a respiratory tract infection since your last visit here? No   Have you been to the emergency room since your last visit here? No   Have you been hospitalized since your last visit here?  No                Objective:        1. 25 foot timed walk: 3.4s 2017  Timed 25 Foot Walk: 10/2/2019 10/16/2020   Did patient wear an AFO? No No   Was assistive device used? No No   Time for 25 Foot Walk (seconds) 3.05 3.4   Time for 25 Foot Walk (seconds) 3.45 3.5     Neurologic Exam     Mental Status   Oriented to person, place, and time.   Attention: normal.   Speech: speech is normal   Level of consciousness: alert  Normal comprehension.     Cranial Nerves     CN III, IV, VI   Extraocular motions are normal.     CN V   Facial sensation intact.     CN VII   Facial expression full, symmetric.     CN VIII   Hearing: intact (to conversation through video visit)    CN XI   Right trapezius strength: normal (full AROM noted)  Left trapezius strength: normal (full AROM noted)    CN XII   Tongue deviation: none    Motor Exam   Muscle bulk: normal  MMT not performed due to nature of video visit; however, observed full AROM of UE's and LE's     Sensory Exam   Right arm vibration: normal  Left arm vibration: normal  Right leg vibration: decreased from toes  Left leg vibration: decreased from toes    Gait, Coordination, and Reflexes     Gait  Gait: normal    Coordination   Heel to shin coordination: normal  Tandem walking coordination: normal  RSM intact  Patient able to perform heel/toe/tandem walk independently  Patient able to hop on each foot individually, full squat, and  demonstrate high-stepping         Imaging:     Results for orders placed during the hospital encounter of 04/12/19   MRI Brain W WO Contrast    Narrative EXAMINATION:  MRI BRAIN W WO CONTRAST    CLINICAL HISTORY:  Multiple sclerosisMS worsening symptoms;    TECHNIQUE:  Sagittal T1, T2 FLAIR.  Axial T1, T2, T2 FLAIR, DWI.  Axial and coronal T1 post contrast.    COMPARISON:  Previous MRI examinations of the brain from July 27, 2018 and July 29, 2016 were reviewed.    FINDINGS:  There is no restricted diffusion. Again demonstrated are multiple elliptical shaped foci of T2 FLAIR hyperintensity that radiate outward from the bodies of the lateral ventricles consistent with demyelinating plaques of multiple sclerosis.  Mild T2 FLAIR hyperintense signal about the temporal horn of the left lateral ventricle laterally.  Several of the lesions demonstrate central volume loss such as within the mid left corona radiata.  No abnormal enhancement.  These findings are unchanged compared to the prior 2 studies.  No knew such T2 FLAIR hyperintense lesions.    The ventricles and sulci are normal in size and configuration. Midline structures are normal. There are preserved arterial flow-voids on T2 weighted imaging. The paranasal sinuses and mastoid air cells are clear.    No abnormal marrow signal is identified.      Impression Persistent findings consistent with demyelinating disease and multiple sclerosis.  No abnormal enhancement as may occur with active demyelination.  No significant change compared to the prior to MRI examinations of the brain dating back to July 2016.      Electronically signed by: Wade Ramirez Jr., MD  Date:    04/12/2019  Time:    14:20     Results for orders placed during the hospital encounter of 04/12/19   MRI Cervical Spine W WO Cont    Narrative EXAMINATION:  MRI CERVICAL SPINE W WO CONTRAST    CLINICAL HISTORY:  Multiple sclerosisworsening MS symptoms;    TECHNIQUE:  TECHNIQUE: MR Cervical spine with  contrast. Sagittal T1, T2, PD, STIR.  Axial T1, T2. Post contrast Sagittal and axial T1.    COMPARISON:  Prior MRI of the cervical spine from July 29, 2016.    FINDINGS:  Again demonstrated is pronounced metal artifact within the mid cervical region relating to prior surgery.  This somewhat limits evaluation.  Persistent suggestion of patchy T2 hyperintensity within the dorsal cord at the level of C2 and within the left hemicord at the level of T1. No new abnormal cord signal within the limits of artifact.  No definite abnormal enhancement.      Impression 1. Limited exam due to metal artifact from to prior cervical spine surgery.  The cord is not well demonstrated from C3 inferiorly to C7.  2. Persistent abnormal T2 hyperintensity within the cord substance at the levels of C2 and T1 which may relate to demyelinating disease.  No definite new cord signal abnormalities.      Electronically signed by: Wade Ramirez Jr., MD  Date:    04/12/2019  Time:    14:35         Labs:     Lab Results   Component Value Date    [id number] 60 08/14/2020    AHEHAMRR96AC 56 02/15/2019    XVPJHDKM52OY 51 02/06/2018     No results found for: JCVINDEX, JCVANTIBODY  Lab Results   Component Value Date    BS4LJDTR 72.0 08/14/2020    ABSOLUTECD3 1323.0 08/14/2020    NS5HXZVG 15.0 08/14/2020    ABSOLUTECD8 275.0 08/14/2020    KS7XXPJN 56.4 08/14/2020    ABSOLUTECD4 1037.0 08/14/2020    LABCD48 3.77 (H) 08/14/2020     Lab Results   Component Value Date    WBC 4.67 10/16/2020    RBC 4.41 10/16/2020    HGB 13.5 10/16/2020    HCT 41.3 10/16/2020    MCV 94 10/16/2020    MCH 30.6 10/16/2020    MCHC 32.7 10/16/2020    RDW 12.8 10/16/2020     10/16/2020    MPV 9.6 10/16/2020    GRAN 2.9 10/16/2020    GRAN 61.7 10/16/2020    LYMPH 1.4 10/16/2020    LYMPH 29.3 10/16/2020    MONO 0.3 10/16/2020    MONO 7.1 10/16/2020    EOS 0.1 10/16/2020    BASO 0.03 10/16/2020    EOSINOPHIL 1.1 10/16/2020    BASOPHIL 0.6 10/16/2020     Sodium   Date Value Ref  Range Status   03/22/2019 139 136 - 145 mmol/L Final     Potassium   Date Value Ref Range Status   03/22/2019 4.5 3.5 - 5.1 mmol/L Final     Chloride   Date Value Ref Range Status   03/22/2019 103 95 - 110 mmol/L Final     CO2   Date Value Ref Range Status   03/22/2019 29 23 - 29 mmol/L Final     Glucose   Date Value Ref Range Status   03/22/2019 94 70 - 110 mg/dL Final     BUN, Bld   Date Value Ref Range Status   03/22/2019 15 6 - 20 mg/dL Final     Creatinine   Date Value Ref Range Status   03/22/2019 0.8 0.5 - 1.4 mg/dL Final     Calcium   Date Value Ref Range Status   03/22/2019 9.7 8.7 - 10.5 mg/dL Final     Total Protein   Date Value Ref Range Status   08/14/2020 7.2 6.0 - 8.4 g/dL Final     Albumin   Date Value Ref Range Status   08/14/2020 4.1 3.5 - 5.2 g/dL Final     Total Bilirubin   Date Value Ref Range Status   08/14/2020 0.5 0.1 - 1.0 mg/dL Final     Comment:     For infants and newborns, interpretation of results should be based  on gestational age, weight and in agreement with clinical  observations.  Premature Infant recommended reference ranges:  Up to 24 hours.............<8.0 mg/dL  Up to 48 hours............<12.0 mg/dL  3-5 days..................<15.0 mg/dL  6-29 days.................<15.0 mg/dL       Alkaline Phosphatase   Date Value Ref Range Status   08/14/2020 82 55 - 135 U/L Final     AST   Date Value Ref Range Status   08/14/2020 14 10 - 40 U/L Final     ALT   Date Value Ref Range Status   08/14/2020 10 10 - 44 U/L Final     Anion Gap   Date Value Ref Range Status   03/22/2019 7 (L) 8 - 16 mmol/L Final     eGFR if    Date Value Ref Range Status   03/22/2019 >60.0 >60 mL/min/1.73 m^2 Final     eGFR if non    Date Value Ref Range Status   03/22/2019 >60.0 >60 mL/min/1.73 m^2 Final     Comment:     Calculation used to obtain the estimated glomerular filtration  rate (eGFR) is the CKD-EPI equation.        No results found for: HEPBSAG, HEPBSAB, HEPBCAB        MS  Impression and Plan:     NEURO MULTIPLE SCLEROSIS IMPRESSION:   MS Status:     Number of relapses in the past year?:  0    Clinical Progression:  Clinically Stable    MRI Progression comment:  MRI brain ordered-will do at [place]-last MRI 4/2019(delayed due to COVID)  Plan:     DMT:  No change in management    DMT comment:  Will continue Tecfidera for now, will check Immunoglobulins and repeat CBC(CD8 normal) to assess level of immune suppression. Written information provided today regarding Ocrevus, will revisit after MRI and labs via portal.     Symptom Management:  Implement change in symptom management    Implement Change in Symptom Management:  Fatigue, Mood and Bladder ([id number]; refilled Prozac today; bladder irritant sheet given)     Next Imaging Due: 10/18/2020     Next Labs Due: 10/18/2020    Our visit today lasted 40 minutes, and 100% of this time was spent face to face with the patient. Over 50% of this visit included discussion of the treatment plan/medication changes/symptom management/exam findings/imaging/coordination of care.     Problem List Items Addressed This Visit        Neuro    Multiple sclerosis - Primary (Chronic)    Relevant Orders    CBC auto differential (Completed)    Immunoglobulins (IgG, IgA, IgM) Quantitative (Completed)    MRI Brain Demyelinating Without Contrast      Other Visit Diagnoses     Immunosuppression due to drug therapy        Relevant Orders    CBC auto differential (Completed)    Immunoglobulins (IgG, IgA, IgM) Quantitative (Completed)    Depression, unspecified depression type        Relevant Medications    FLUoxetine 20 MG capsule    FLUoxetine 10 MG Tab          Follow up in about 6 months (around 4/16/2021) for follow up with Dr. Matthews.  Patient agreed to POC today.    Attending, Dr. Matthews, was available during today's encounter.     Shakira West PA-C  [facility]       yes

## 2020-10-19 ENCOUNTER — TELEPHONE (OUTPATIENT)
Dept: NEUROLOGY | Facility: CLINIC | Age: 50
End: 2020-10-19

## 2020-10-19 NOTE — TELEPHONE ENCOUNTER
----- Message from Shakira West PA-C sent at 10/18/2020  4:17 PM CDT -----  Please schedule MRI at [place]

## 2020-11-02 ENCOUNTER — PATIENT MESSAGE (OUTPATIENT)
Dept: PSYCHIATRY | Facility: CLINIC | Age: 50
End: 2020-11-02

## 2020-11-15 ENCOUNTER — PATIENT MESSAGE (OUTPATIENT)
Dept: NEUROLOGY | Facility: CLINIC | Age: 50
End: 2020-11-15

## 2020-11-15 DIAGNOSIS — G43.101 MIGRAINE WITH AURA AND WITH STATUS MIGRAINOSUS, NOT INTRACTABLE: ICD-10-CM

## 2020-11-18 RX ORDER — RIZATRIPTAN BENZOATE 10 MG/1
10 TABLET ORAL DAILY PRN
Qty: 15 TABLET | Refills: 1 | Status: SHIPPED | OUTPATIENT
Start: 2020-11-18 | End: 2021-03-19

## 2021-01-18 ENCOUNTER — PATIENT MESSAGE (OUTPATIENT)
Dept: NEUROLOGY | Facility: CLINIC | Age: 51
End: 2021-01-18

## 2021-01-19 ENCOUNTER — PATIENT MESSAGE (OUTPATIENT)
Dept: NEUROLOGY | Facility: CLINIC | Age: 51
End: 2021-01-19

## 2021-02-05 ENCOUNTER — PATIENT MESSAGE (OUTPATIENT)
Dept: NEUROLOGY | Facility: CLINIC | Age: 51
End: 2021-02-05

## 2021-02-23 ENCOUNTER — TELEPHONE (OUTPATIENT)
Dept: NEUROLOGY | Facility: CLINIC | Age: 51
End: 2021-02-23

## 2021-02-24 ENCOUNTER — PATIENT MESSAGE (OUTPATIENT)
Dept: NEUROLOGY | Facility: CLINIC | Age: 51
End: 2021-02-24

## 2021-02-24 DIAGNOSIS — G35 MULTIPLE SCLEROSIS: Primary | ICD-10-CM

## 2021-02-24 DIAGNOSIS — Z79.899 IMMUNOSUPPRESSION DUE TO DRUG THERAPY: ICD-10-CM

## 2021-02-24 DIAGNOSIS — D84.821 IMMUNOSUPPRESSION DUE TO DRUG THERAPY: ICD-10-CM

## 2021-02-26 ENCOUNTER — LAB VISIT (OUTPATIENT)
Dept: LAB | Facility: HOSPITAL | Age: 51
End: 2021-02-26
Attending: PHYSICIAN ASSISTANT
Payer: COMMERCIAL

## 2021-02-26 DIAGNOSIS — D84.821 IMMUNOSUPPRESSION DUE TO DRUG THERAPY: ICD-10-CM

## 2021-02-26 DIAGNOSIS — Z79.899 IMMUNOSUPPRESSION DUE TO DRUG THERAPY: ICD-10-CM

## 2021-02-26 DIAGNOSIS — G35 MULTIPLE SCLEROSIS: ICD-10-CM

## 2021-02-26 LAB
BASOPHILS # BLD AUTO: 0.04 K/UL (ref 0–0.2)
BASOPHILS NFR BLD: 0.6 % (ref 0–1.9)
DIFFERENTIAL METHOD: ABNORMAL
EOSINOPHIL # BLD AUTO: 0.1 K/UL (ref 0–0.5)
EOSINOPHIL NFR BLD: 1.1 % (ref 0–8)
ERYTHROCYTE [DISTWIDTH] IN BLOOD BY AUTOMATED COUNT: 12.8 % (ref 11.5–14.5)
HCT VFR BLD AUTO: 40.9 % (ref 37–48.5)
HGB BLD-MCNC: 13.1 G/DL (ref 12–16)
IMM GRANULOCYTES # BLD AUTO: 0.02 K/UL (ref 0–0.04)
IMM GRANULOCYTES NFR BLD AUTO: 0.3 % (ref 0–0.5)
LYMPHOCYTES # BLD AUTO: 1.6 K/UL (ref 1–4.8)
LYMPHOCYTES NFR BLD: 25.5 % (ref 18–48)
MCH RBC QN AUTO: 30.6 PG (ref 27–31)
MCHC RBC AUTO-ENTMCNC: 32 G/DL (ref 32–36)
MCV RBC AUTO: 96 FL (ref 82–98)
MONOCYTES # BLD AUTO: 0.7 K/UL (ref 0.3–1)
MONOCYTES NFR BLD: 10.6 % (ref 4–15)
NEUTROPHILS # BLD AUTO: 3.9 K/UL (ref 1.8–7.7)
NEUTROPHILS NFR BLD: 61.9 % (ref 38–73)
NRBC BLD-RTO: 0 /100 WBC
PLATELET # BLD AUTO: 378 K/UL (ref 150–350)
PMV BLD AUTO: 10 FL (ref 9.2–12.9)
RBC # BLD AUTO: 4.28 M/UL (ref 4–5.4)
WBC # BLD AUTO: 6.24 K/UL (ref 3.9–12.7)

## 2021-02-26 PROCEDURE — 80076 HEPATIC FUNCTION PANEL: CPT

## 2021-02-26 PROCEDURE — 86359 T CELLS TOTAL COUNT: CPT

## 2021-02-26 PROCEDURE — 36415 COLL VENOUS BLD VENIPUNCTURE: CPT

## 2021-02-26 PROCEDURE — 86360 T CELL ABSOLUTE COUNT/RATIO: CPT

## 2021-02-26 PROCEDURE — 85025 COMPLETE CBC W/AUTO DIFF WBC: CPT

## 2021-02-27 LAB
ALBUMIN SERPL BCP-MCNC: 4.3 G/DL (ref 3.5–5.2)
ALP SERPL-CCNC: 83 U/L (ref 55–135)
ALT SERPL W/O P-5'-P-CCNC: 13 U/L (ref 10–44)
AST SERPL-CCNC: 18 U/L (ref 10–40)
BILIRUB DIRECT SERPL-MCNC: 0.1 MG/DL (ref 0.1–0.3)
BILIRUB SERPL-MCNC: 0.2 MG/DL (ref 0.1–1)
PROT SERPL-MCNC: 7.6 G/DL (ref 6–8.4)

## 2021-03-01 LAB
ABSOLUTE CD3: 1220 CELLS/UL (ref 700–2100)
ABSOLUTE CD8: 235 CELLS/UL (ref 200–900)
CD3%: 73.3 % (ref 55–83)
CD3+CD4+ CELLS # BLD: 979 CELLS/UL (ref 300–1400)
CD3+CD4+ CELLS NFR BLD: 58.8 % (ref 28–57)
CD4/CD8 RATIO: 4.16 (ref 0.9–3.6)
CD8 % SUPPRESSOR T CELL: 14.1 % (ref 10–39)

## 2021-03-10 ENCOUNTER — PATIENT MESSAGE (OUTPATIENT)
Dept: NEUROLOGY | Facility: CLINIC | Age: 51
End: 2021-03-10

## 2021-04-28 ENCOUNTER — PATIENT MESSAGE (OUTPATIENT)
Dept: NEUROLOGY | Facility: CLINIC | Age: 51
End: 2021-04-28

## 2021-04-28 DIAGNOSIS — F32.A DEPRESSION, UNSPECIFIED DEPRESSION TYPE: ICD-10-CM

## 2021-04-29 ENCOUNTER — PATIENT MESSAGE (OUTPATIENT)
Dept: NEUROLOGY | Facility: CLINIC | Age: 51
End: 2021-04-29

## 2021-04-29 RX ORDER — FLUOXETINE HYDROCHLORIDE 20 MG/1
20 CAPSULE ORAL DAILY
Qty: 30 CAPSULE | Refills: 0 | Status: SHIPPED | OUTPATIENT
Start: 2021-04-29 | End: 2021-05-27 | Stop reason: DRUGHIGH

## 2021-04-29 RX ORDER — FLUOXETINE 10 MG/1
10 TABLET ORAL DAILY
Qty: 30 TABLET | Refills: 0 | Status: SHIPPED | OUTPATIENT
Start: 2021-04-29 | End: 2021-05-27 | Stop reason: DRUGHIGH

## 2021-05-10 ENCOUNTER — PATIENT MESSAGE (OUTPATIENT)
Dept: NEUROLOGY | Facility: CLINIC | Age: 51
End: 2021-05-10

## 2021-05-10 ENCOUNTER — PATIENT MESSAGE (OUTPATIENT)
Dept: RESEARCH | Facility: HOSPITAL | Age: 51
End: 2021-05-10

## 2021-05-27 ENCOUNTER — OFFICE VISIT (OUTPATIENT)
Dept: NEUROLOGY | Facility: CLINIC | Age: 51
End: 2021-05-27
Payer: COMMERCIAL

## 2021-05-27 VITALS
HEIGHT: 66 IN | HEART RATE: 73 BPM | DIASTOLIC BLOOD PRESSURE: 75 MMHG | SYSTOLIC BLOOD PRESSURE: 111 MMHG | BODY MASS INDEX: 30.88 KG/M2 | WEIGHT: 192.13 LBS

## 2021-05-27 DIAGNOSIS — E55.9 VITAMIN D INSUFFICIENCY: ICD-10-CM

## 2021-05-27 DIAGNOSIS — Z79.899 IMMUNOSUPPRESSION DUE TO DRUG THERAPY: ICD-10-CM

## 2021-05-27 DIAGNOSIS — G35 MS (MULTIPLE SCLEROSIS): Primary | ICD-10-CM

## 2021-05-27 DIAGNOSIS — Z71.89 COUNSELING REGARDING GOALS OF CARE: ICD-10-CM

## 2021-05-27 DIAGNOSIS — F51.01 PRIMARY INSOMNIA: ICD-10-CM

## 2021-05-27 DIAGNOSIS — F40.240 CLAUSTROPHOBIA: ICD-10-CM

## 2021-05-27 DIAGNOSIS — G35 MULTIPLE SCLEROSIS: Chronic | ICD-10-CM

## 2021-05-27 DIAGNOSIS — D84.821 IMMUNOSUPPRESSION DUE TO DRUG THERAPY: ICD-10-CM

## 2021-05-27 DIAGNOSIS — R53.83 FATIGUE, UNSPECIFIED TYPE: ICD-10-CM

## 2021-05-27 DIAGNOSIS — F32.A DEPRESSION, UNSPECIFIED DEPRESSION TYPE: ICD-10-CM

## 2021-05-27 DIAGNOSIS — M79.7 FIBROMYALGIA: ICD-10-CM

## 2021-05-27 PROCEDURE — 99999 PR PBB SHADOW E&M-EST. PATIENT-LVL III: ICD-10-PCS | Mod: PBBFAC,,, | Performed by: PHYSICIAN ASSISTANT

## 2021-05-27 PROCEDURE — 99215 PR OFFICE/OUTPT VISIT, EST, LEVL V, 40-54 MIN: ICD-10-PCS | Mod: S$GLB,,, | Performed by: PHYSICIAN ASSISTANT

## 2021-05-27 PROCEDURE — 99215 OFFICE O/P EST HI 40 MIN: CPT | Mod: S$GLB,,, | Performed by: PHYSICIAN ASSISTANT

## 2021-05-27 PROCEDURE — 99999 PR PBB SHADOW E&M-EST. PATIENT-LVL III: CPT | Mod: PBBFAC,,, | Performed by: PHYSICIAN ASSISTANT

## 2021-05-27 RX ORDER — ZOLPIDEM TARTRATE 5 MG/1
TABLET ORAL
Qty: 90 TABLET | Refills: 1 | Status: SHIPPED | OUTPATIENT
Start: 2021-05-27 | End: 2022-12-29

## 2021-05-27 RX ORDER — FLUOXETINE HYDROCHLORIDE 40 MG/1
40 CAPSULE ORAL DAILY
Qty: 90 CAPSULE | Refills: 0 | Status: SHIPPED | OUTPATIENT
Start: 2021-05-27 | End: 2021-06-18 | Stop reason: SDUPTHER

## 2021-05-27 RX ORDER — DIAZEPAM 5 MG/1
TABLET ORAL
Qty: 2 TABLET | Refills: 0 | Status: SHIPPED | OUTPATIENT
Start: 2021-05-27 | End: 2021-11-22

## 2021-05-28 PROBLEM — F51.01 PRIMARY INSOMNIA: Status: ACTIVE | Noted: 2021-05-28

## 2021-05-31 ENCOUNTER — PATIENT MESSAGE (OUTPATIENT)
Dept: PSYCHIATRY | Facility: CLINIC | Age: 51
End: 2021-05-31

## 2021-06-16 ENCOUNTER — HOSPITAL ENCOUNTER (OUTPATIENT)
Dept: RADIOLOGY | Facility: HOSPITAL | Age: 51
Discharge: HOME | End: 2021-06-16
Attending: PHYSICIAN ASSISTANT
Payer: COMMERCIAL

## 2021-06-16 DIAGNOSIS — G35 MULTIPLE SCLEROSIS: ICD-10-CM

## 2021-06-16 PROCEDURE — 70551 MRI BRAIN STEM W/O DYE: CPT | Mod: 26,,, | Performed by: RADIOLOGY

## 2021-06-16 PROCEDURE — 70551 MRI BRAIN DEMYELINATING WITHOUT CONTRAST: ICD-10-PCS | Mod: 26,,, | Performed by: RADIOLOGY

## 2021-06-16 PROCEDURE — 70551 MRI BRAIN STEM W/O DYE: CPT | Mod: TC

## 2021-06-18 ENCOUNTER — PATIENT MESSAGE (OUTPATIENT)
Dept: NEUROLOGY | Facility: CLINIC | Age: 51
End: 2021-06-18

## 2021-06-18 DIAGNOSIS — G35 MS (MULTIPLE SCLEROSIS): ICD-10-CM

## 2021-06-18 DIAGNOSIS — R53.83 FATIGUE, UNSPECIFIED TYPE: ICD-10-CM

## 2021-06-18 DIAGNOSIS — F32.A DEPRESSION, UNSPECIFIED DEPRESSION TYPE: ICD-10-CM

## 2021-06-18 RX ORDER — FLUOXETINE HYDROCHLORIDE 40 MG/1
40 CAPSULE ORAL DAILY
Qty: 90 CAPSULE | Refills: 4 | Status: SHIPPED | OUTPATIENT
Start: 2021-06-18 | End: 2022-03-22 | Stop reason: SDUPTHER

## 2021-07-15 ENCOUNTER — LAB VISIT (OUTPATIENT)
Dept: LAB | Facility: HOSPITAL | Age: 51
End: 2021-07-15
Attending: PHYSICIAN ASSISTANT
Payer: COMMERCIAL

## 2021-07-15 DIAGNOSIS — G35 MS (MULTIPLE SCLEROSIS): ICD-10-CM

## 2021-07-15 DIAGNOSIS — E55.9 VITAMIN D INSUFFICIENCY: ICD-10-CM

## 2021-07-15 LAB
ALBUMIN SERPL BCP-MCNC: 3.9 G/DL (ref 3.5–5.2)
ALP SERPL-CCNC: 79 U/L (ref 55–135)
ALT SERPL W/O P-5'-P-CCNC: 18 U/L (ref 10–44)
AST SERPL-CCNC: 15 U/L (ref 10–40)
BASOPHILS # BLD AUTO: 0.04 K/UL (ref 0–0.2)
BASOPHILS NFR BLD: 0.7 % (ref 0–1.9)
BILIRUB DIRECT SERPL-MCNC: 0.2 MG/DL (ref 0.1–0.3)
BILIRUB SERPL-MCNC: 0.5 MG/DL (ref 0.1–1)
DIFFERENTIAL METHOD: ABNORMAL
EOSINOPHIL # BLD AUTO: 0.1 K/UL (ref 0–0.5)
EOSINOPHIL NFR BLD: 1.5 % (ref 0–8)
ERYTHROCYTE [DISTWIDTH] IN BLOOD BY AUTOMATED COUNT: 13.1 % (ref 11.5–14.5)
HCT VFR BLD AUTO: 39.7 % (ref 37–48.5)
HGB BLD-MCNC: 12.6 G/DL (ref 12–16)
IMM GRANULOCYTES # BLD AUTO: 0.03 K/UL (ref 0–0.04)
IMM GRANULOCYTES NFR BLD AUTO: 0.6 % (ref 0–0.5)
LYMPHOCYTES # BLD AUTO: 1.6 K/UL (ref 1–4.8)
LYMPHOCYTES NFR BLD: 30.5 % (ref 18–48)
MCH RBC QN AUTO: 29.6 PG (ref 27–31)
MCHC RBC AUTO-ENTMCNC: 31.7 G/DL (ref 32–36)
MCV RBC AUTO: 93 FL (ref 82–98)
MONOCYTES # BLD AUTO: 0.3 K/UL (ref 0.3–1)
MONOCYTES NFR BLD: 5.9 % (ref 4–15)
NEUTROPHILS # BLD AUTO: 3.3 K/UL (ref 1.8–7.7)
NEUTROPHILS NFR BLD: 60.8 % (ref 38–73)
NRBC BLD-RTO: 0 /100 WBC
PLATELET # BLD AUTO: 344 K/UL (ref 150–450)
PMV BLD AUTO: 9.4 FL (ref 9.2–12.9)
PROT SERPL-MCNC: 7 G/DL (ref 6–8.4)
RBC # BLD AUTO: 4.26 M/UL (ref 4–5.4)
WBC # BLD AUTO: 5.38 K/UL (ref 3.9–12.7)

## 2021-07-15 PROCEDURE — 82306 VITAMIN D 25 HYDROXY: CPT | Performed by: PHYSICIAN ASSISTANT

## 2021-07-15 PROCEDURE — 36415 COLL VENOUS BLD VENIPUNCTURE: CPT | Performed by: PHYSICIAN ASSISTANT

## 2021-07-15 PROCEDURE — 86359 T CELLS TOTAL COUNT: CPT | Performed by: PHYSICIAN ASSISTANT

## 2021-07-15 PROCEDURE — 85025 COMPLETE CBC W/AUTO DIFF WBC: CPT | Performed by: PHYSICIAN ASSISTANT

## 2021-07-15 PROCEDURE — 86360 T CELL ABSOLUTE COUNT/RATIO: CPT | Performed by: PHYSICIAN ASSISTANT

## 2021-07-15 PROCEDURE — 80076 HEPATIC FUNCTION PANEL: CPT | Performed by: PHYSICIAN ASSISTANT

## 2021-07-16 LAB
25(OH)D3+25(OH)D2 SERPL-MCNC: 53 NG/ML (ref 30–96)
ABSOLUTE CD3: 1122 CELLS/UL (ref 700–2100)
ABSOLUTE CD8: 190 CELLS/UL (ref 200–900)
CD3%: 74.7 % (ref 55–83)
CD3+CD4+ CELLS # BLD: 924 CELLS/UL (ref 300–1400)
CD3+CD4+ CELLS NFR BLD: 61.5 % (ref 28–57)
CD4/CD8 RATIO: 4.85 (ref 0.9–3.6)
CD8 % SUPPRESSOR T CELL: 12.7 % (ref 10–39)

## 2021-08-04 ENCOUNTER — PATIENT MESSAGE (OUTPATIENT)
Dept: NEUROLOGY | Facility: CLINIC | Age: 51
End: 2021-08-04

## 2021-08-24 ENCOUNTER — PATIENT MESSAGE (OUTPATIENT)
Dept: NEUROLOGY | Facility: CLINIC | Age: 51
End: 2021-08-24

## 2021-09-01 ENCOUNTER — PATIENT MESSAGE (OUTPATIENT)
Dept: PSYCHIATRY | Facility: CLINIC | Age: 51
End: 2021-09-01

## 2021-09-02 ENCOUNTER — PATIENT MESSAGE (OUTPATIENT)
Dept: PSYCHIATRY | Facility: CLINIC | Age: 51
End: 2021-09-02

## 2021-11-16 ENCOUNTER — TELEPHONE (OUTPATIENT)
Dept: NEUROLOGY | Facility: CLINIC | Age: 51
End: 2021-11-16
Payer: COMMERCIAL

## 2021-11-22 ENCOUNTER — OFFICE VISIT (OUTPATIENT)
Dept: NEUROLOGY | Facility: CLINIC | Age: 51
End: 2021-11-22
Payer: COMMERCIAL

## 2021-11-22 VITALS
HEIGHT: 66 IN | BODY MASS INDEX: 32.08 KG/M2 | HEART RATE: 69 BPM | DIASTOLIC BLOOD PRESSURE: 74 MMHG | SYSTOLIC BLOOD PRESSURE: 112 MMHG | WEIGHT: 199.63 LBS

## 2021-11-22 DIAGNOSIS — Z79.899 IMMUNOSUPPRESSION DUE TO DRUG THERAPY: ICD-10-CM

## 2021-11-22 DIAGNOSIS — N31.9 NEUROGENIC BLADDER: ICD-10-CM

## 2021-11-22 DIAGNOSIS — D84.821 IMMUNOSUPPRESSION DUE TO DRUG THERAPY: ICD-10-CM

## 2021-11-22 DIAGNOSIS — Z71.89 COUNSELING REGARDING GOALS OF CARE: ICD-10-CM

## 2021-11-22 DIAGNOSIS — G35 MS (MULTIPLE SCLEROSIS): Primary | ICD-10-CM

## 2021-11-22 PROCEDURE — 99214 OFFICE O/P EST MOD 30 MIN: CPT | Mod: S$GLB,,, | Performed by: PSYCHIATRY & NEUROLOGY

## 2021-11-22 PROCEDURE — 99214 PR OFFICE/OUTPT VISIT, EST, LEVL IV, 30-39 MIN: ICD-10-PCS | Mod: S$GLB,,, | Performed by: PSYCHIATRY & NEUROLOGY

## 2021-11-22 PROCEDURE — 99999 PR PBB SHADOW E&M-EST. PATIENT-LVL III: ICD-10-PCS | Mod: PBBFAC,,, | Performed by: PSYCHIATRY & NEUROLOGY

## 2021-11-22 PROCEDURE — 99999 PR PBB SHADOW E&M-EST. PATIENT-LVL III: CPT | Mod: PBBFAC,,, | Performed by: PSYCHIATRY & NEUROLOGY

## 2021-11-27 ENCOUNTER — PATIENT MESSAGE (OUTPATIENT)
Dept: NEUROLOGY | Facility: CLINIC | Age: 51
End: 2021-11-27
Payer: COMMERCIAL

## 2021-11-27 DIAGNOSIS — G35 MS (MULTIPLE SCLEROSIS): Primary | ICD-10-CM

## 2021-11-27 DIAGNOSIS — G47.00 INSOMNIA, UNSPECIFIED TYPE: ICD-10-CM

## 2021-12-02 RX ORDER — TRAZODONE HYDROCHLORIDE 50 MG/1
50 TABLET ORAL NIGHTLY
Qty: 30 TABLET | Refills: 11 | Status: SHIPPED | OUTPATIENT
Start: 2021-12-02 | End: 2021-12-30 | Stop reason: SDUPTHER

## 2021-12-21 ENCOUNTER — PATIENT MESSAGE (OUTPATIENT)
Dept: NEUROLOGY | Facility: CLINIC | Age: 51
End: 2021-12-21
Payer: COMMERCIAL

## 2021-12-30 ENCOUNTER — PATIENT MESSAGE (OUTPATIENT)
Dept: NEUROLOGY | Facility: CLINIC | Age: 51
End: 2021-12-30
Payer: COMMERCIAL

## 2021-12-30 DIAGNOSIS — G47.00 INSOMNIA, UNSPECIFIED TYPE: ICD-10-CM

## 2021-12-30 NOTE — TELEPHONE ENCOUNTER
Call [facility] pharmacy and spoke with Gavino to cancel trazodone rx as pt request to go to [facility] mail order

## 2022-01-04 ENCOUNTER — LAB VISIT (OUTPATIENT)
Dept: LAB | Facility: HOSPITAL | Age: 52
End: 2022-01-04
Payer: COMMERCIAL

## 2022-01-04 DIAGNOSIS — G35 MS (MULTIPLE SCLEROSIS): ICD-10-CM

## 2022-01-04 LAB
ALBUMIN SERPL BCP-MCNC: 4.3 G/DL (ref 3.5–5.2)
ALP SERPL-CCNC: 98 U/L (ref 55–135)
ALT SERPL W/O P-5'-P-CCNC: 15 U/L (ref 10–44)
AST SERPL-CCNC: 16 U/L (ref 10–40)
BASOPHILS # BLD AUTO: 0.04 K/UL (ref 0–0.2)
BASOPHILS NFR BLD: 0.6 % (ref 0–1.9)
BILIRUB DIRECT SERPL-MCNC: 0.1 MG/DL (ref 0.1–0.3)
BILIRUB SERPL-MCNC: 0.4 MG/DL (ref 0.1–1)
DIFFERENTIAL METHOD: ABNORMAL
EOSINOPHIL # BLD AUTO: 0.1 K/UL (ref 0–0.5)
EOSINOPHIL NFR BLD: 1.2 % (ref 0–8)
ERYTHROCYTE [DISTWIDTH] IN BLOOD BY AUTOMATED COUNT: 13 % (ref 11.5–14.5)
HCT VFR BLD AUTO: 43.4 % (ref 37–48.5)
HGB BLD-MCNC: 13.5 G/DL (ref 12–16)
IMM GRANULOCYTES # BLD AUTO: 0.01 K/UL (ref 0–0.04)
IMM GRANULOCYTES NFR BLD AUTO: 0.2 % (ref 0–0.5)
LYMPHOCYTES # BLD AUTO: 1.8 K/UL (ref 1–4.8)
LYMPHOCYTES NFR BLD: 26.7 % (ref 18–48)
MCH RBC QN AUTO: 30.1 PG (ref 27–31)
MCHC RBC AUTO-ENTMCNC: 31.1 G/DL (ref 32–36)
MCV RBC AUTO: 97 FL (ref 82–98)
MONOCYTES # BLD AUTO: 0.6 K/UL (ref 0.3–1)
MONOCYTES NFR BLD: 8.5 % (ref 4–15)
NEUTROPHILS # BLD AUTO: 4.1 K/UL (ref 1.8–7.7)
NEUTROPHILS NFR BLD: 62.8 % (ref 38–73)
NRBC BLD-RTO: 0 /100 WBC
PLATELET # BLD AUTO: 392 K/UL (ref 150–450)
PMV BLD AUTO: 10.1 FL (ref 9.2–12.9)
PROT SERPL-MCNC: 7.3 G/DL (ref 6–8.4)
RBC # BLD AUTO: 4.48 M/UL (ref 4–5.4)
WBC # BLD AUTO: 6.59 K/UL (ref 3.9–12.7)

## 2022-01-04 PROCEDURE — 80076 HEPATIC FUNCTION PANEL: CPT | Performed by: PSYCHIATRY & NEUROLOGY

## 2022-01-04 PROCEDURE — 36415 COLL VENOUS BLD VENIPUNCTURE: CPT | Performed by: PSYCHIATRY & NEUROLOGY

## 2022-01-04 PROCEDURE — 86360 T CELL ABSOLUTE COUNT/RATIO: CPT | Performed by: PSYCHIATRY & NEUROLOGY

## 2022-01-04 PROCEDURE — 85025 COMPLETE CBC W/AUTO DIFF WBC: CPT | Performed by: PSYCHIATRY & NEUROLOGY

## 2022-01-04 PROCEDURE — 86359 T CELLS TOTAL COUNT: CPT | Performed by: PSYCHIATRY & NEUROLOGY

## 2022-01-04 RX ORDER — TRAZODONE HYDROCHLORIDE 50 MG/1
50 TABLET ORAL NIGHTLY
Qty: 30 TABLET | Refills: 0 | Status: SHIPPED | OUTPATIENT
Start: 2022-01-04 | End: 2022-12-29

## 2022-01-04 RX ORDER — TRAZODONE HYDROCHLORIDE 50 MG/1
50 TABLET ORAL NIGHTLY
Qty: 30 TABLET | Refills: 11 | Status: SHIPPED | OUTPATIENT
Start: 2022-01-04 | End: 2022-12-29

## 2022-01-05 LAB
ABSOLUTE CD3: 1210 CELLS/UL (ref 700–2100)
ABSOLUTE CD8: 248 CELLS/UL (ref 200–900)
CD3%: 68.4 % (ref 55–83)
CD3+CD4+ CELLS # BLD: 955 CELLS/UL (ref 300–1400)
CD3+CD4+ CELLS NFR BLD: 54 % (ref 28–57)
CD4/CD8 RATIO: 3.85 (ref 0.9–3.6)
CD8 % SUPPRESSOR T CELL: 14 % (ref 10–39)

## 2022-01-19 ENCOUNTER — PATIENT MESSAGE (OUTPATIENT)
Dept: NEUROLOGY | Facility: CLINIC | Age: 52
End: 2022-01-19
Payer: COMMERCIAL

## 2022-02-28 ENCOUNTER — PATIENT MESSAGE (OUTPATIENT)
Dept: PSYCHIATRY | Facility: CLINIC | Age: 52
End: 2022-02-28
Payer: COMMERCIAL

## 2022-03-22 DIAGNOSIS — G35 MS (MULTIPLE SCLEROSIS): ICD-10-CM

## 2022-03-22 DIAGNOSIS — F32.A DEPRESSION, UNSPECIFIED DEPRESSION TYPE: ICD-10-CM

## 2022-03-22 DIAGNOSIS — R53.83 FATIGUE, UNSPECIFIED TYPE: ICD-10-CM

## 2022-03-23 RX ORDER — FLUOXETINE HYDROCHLORIDE 40 MG/1
40 CAPSULE ORAL DAILY
Qty: 90 CAPSULE | Refills: 4 | Status: SHIPPED | OUTPATIENT
Start: 2022-03-23 | End: 2023-05-15

## 2022-03-29 ENCOUNTER — PATIENT MESSAGE (OUTPATIENT)
Dept: NEUROLOGY | Facility: HOSPITAL | Age: 52
End: 2022-03-29
Payer: COMMERCIAL

## 2022-03-29 ENCOUNTER — TELEPHONE (OUTPATIENT)
Dept: NEUROLOGY | Facility: CLINIC | Age: 52
End: 2022-03-29
Payer: COMMERCIAL

## 2022-03-29 NOTE — TELEPHONE ENCOUNTER
----- Message from Cinthya Correa MA sent at 3/29/2022  4:09 PM CDT -----  Regarding: FW: Appt Access  Contact: [phone]  Please contact patient to assist with scheduling.  ----- Message -----  From: Therese Jane  Sent: 3/29/2022   4:06 PM CDT  To: Byron GARCIA Staff  Subject: Appt Access                                      Request Appt    Who Called: Kasandra Welch Type: F/U  Call Back Number:[phone]  Additional Information: The pt call to schedule the first available appt.

## 2022-06-08 ENCOUNTER — PATIENT MESSAGE (OUTPATIENT)
Dept: NEUROLOGY | Facility: CLINIC | Age: 52
End: 2022-06-08
Payer: COMMERCIAL

## 2022-06-08 DIAGNOSIS — G35 MS (MULTIPLE SCLEROSIS): Primary | ICD-10-CM

## 2022-06-10 ENCOUNTER — PATIENT MESSAGE (OUTPATIENT)
Dept: NEUROLOGY | Facility: CLINIC | Age: 52
End: 2022-06-10
Payer: COMMERCIAL

## 2022-06-10 RX ORDER — DIAZEPAM 5 MG/1
TABLET ORAL
Qty: 3 TABLET | Refills: 0 | Status: SHIPPED | OUTPATIENT
Start: 2022-06-10 | End: 2022-12-29

## 2022-06-13 ENCOUNTER — HOSPITAL ENCOUNTER (OUTPATIENT)
Dept: RADIOLOGY | Facility: HOSPITAL | Age: 52
Discharge: HOME | End: 2022-06-13
Attending: PSYCHIATRY & NEUROLOGY
Payer: COMMERCIAL

## 2022-06-13 DIAGNOSIS — G35 MS (MULTIPLE SCLEROSIS): ICD-10-CM

## 2022-06-13 PROCEDURE — 72156 MRI CERVICAL SPINE DEMYELINATING W W/O CONTRAST: ICD-10-PCS | Mod: 26,,, | Performed by: RADIOLOGY

## 2022-06-13 PROCEDURE — 70553 MRI BRAIN STEM W/O & W/DYE: CPT | Mod: 26,,, | Performed by: RADIOLOGY

## 2022-06-13 PROCEDURE — A9585 GADOBUTROL INJECTION: HCPCS | Performed by: PSYCHIATRY & NEUROLOGY

## 2022-06-13 PROCEDURE — 25500020 PHARM REV CODE 255: Performed by: PSYCHIATRY & NEUROLOGY

## 2022-06-13 PROCEDURE — 72156 MRI NECK SPINE W/O & W/DYE: CPT | Mod: TC

## 2022-06-13 PROCEDURE — 72156 MRI NECK SPINE W/O & W/DYE: CPT | Mod: 26,,, | Performed by: RADIOLOGY

## 2022-06-13 PROCEDURE — 70553 MRI BRAIN DEMYELINATING W/ WO CONTRAST: ICD-10-PCS | Mod: 26,,, | Performed by: RADIOLOGY

## 2022-06-13 PROCEDURE — 70553 MRI BRAIN STEM W/O & W/DYE: CPT | Mod: TC

## 2022-06-13 RX ORDER — GADOBUTROL 604.72 MG/ML
9 INJECTION INTRAVENOUS
Status: COMPLETED | OUTPATIENT
Start: 2022-06-13 | End: 2022-06-13

## 2022-06-13 RX ADMIN — GADOBUTROL 9 ML: 604.72 INJECTION INTRAVENOUS at 09:06

## 2022-06-15 ENCOUNTER — OFFICE VISIT (OUTPATIENT)
Dept: NEUROLOGY | Facility: CLINIC | Age: 52
End: 2022-06-15
Payer: COMMERCIAL

## 2022-06-15 ENCOUNTER — TELEPHONE (OUTPATIENT)
Dept: NEUROLOGY | Facility: CLINIC | Age: 52
End: 2022-06-15
Payer: COMMERCIAL

## 2022-06-15 DIAGNOSIS — G35 MS (MULTIPLE SCLEROSIS): Primary | ICD-10-CM

## 2022-06-15 DIAGNOSIS — Z71.89 COUNSELING REGARDING GOALS OF CARE: ICD-10-CM

## 2022-06-15 DIAGNOSIS — D84.9 IMMUNOSUPPRESSION: ICD-10-CM

## 2022-06-15 PROCEDURE — 99215 PR OFFICE/OUTPT VISIT, EST, LEVL V, 40-54 MIN: ICD-10-PCS | Mod: 95,,, | Performed by: PSYCHIATRY & NEUROLOGY

## 2022-06-15 PROCEDURE — 99215 OFFICE O/P EST HI 40 MIN: CPT | Mod: 95,,, | Performed by: PSYCHIATRY & NEUROLOGY

## 2022-06-15 NOTE — Clinical Note
Starting Ocrevus;  labs / ID soon;  paperwork on your desk --please upload and send to her portal so she can sign--we discussed all over VV.  Advised Covid booster soon. thanks

## 2022-06-15 NOTE — PROGRESS NOTES
The patient location is: home   The chief complaint leading to consultation is: MS     Visit type: audiovisual    Face to Face time with patient: 30  40 minutes of total time spent on the encounter, which includes face to face time and non-face to face time preparing to see the patient (eg, review of tests), Obtaining and/or reviewing separately obtained history, Documenting clinical information in the electronic or other health record, Independently interpreting results (not separately reported) and communicating results to the patient/family/caregiver, or Care coordination (not separately reported).     Each patient to whom he or she provides medical services by telemedicine is:  (1) informed of the relationship between the physician and patient and the respective role of any other health care provider with respect to management of the patient; and (2) notified that he or she may decline to receive medical services by telemedicine and may withdraw from such care at any time.      Subjective:          Patient ID: Kasandra Garcia is a 52 y.o. female who presents today for a routine virtual visit for MS.      MS HPI:  · DMT: None--stopped Tecfidera b/c tired of side effects  · Side effects from DMT? Yes -   · Taking vitamin D3 as recommended?    · PT recently developed pain in her left lumbar paraspinal area; this pain moved to left and then she developed numbness in the lateral, anterior and medial thigh.  She reports some difficulty lifting left leg at the hip due to both weakness and pain.  Her PCP treated her with prednisone x 5 days --helped at first, but then the pain returned. States she then developed tingling in the face and hands from the prednisone --better now.   · No bowel or bladder difficulty;   · She reports symptoms are stable, but far from baseline  · Pain ranges from 3/10 to 8/10  · States that UTIs have stopped since stopping Tecfidera   · She is vaxxed x 2  · She has appointment [facility]  Neurosurgery next week for her back --Dr. Ware at [facility]    Medications:  Current Outpatient Medications   Medication Sig    cholecalciferol, vitamin D3, (VITAMIN D3) 5,000 unit Tab Take 5,000 Units by mouth once daily. Alternate 5000 IU and 10,000 IU every other day.    cranberry extract 500 mg Cap Take by mouth.    diazePAM (VALIUM) 5 MG tablet Take 3 tabs po 45 min prior to MRI    dimethyl fumarate 240 mg CpDR TAKE 1 CAPSULE BY MOUTH TWICE DAILY. STORE IN ORIGINAL CONTAINER AT ROOM TEMP.    estradiol 0.05 mg/24 hr td ptsw (VIVELLE-DOT) 0.05 mg/24 hr Place onto the skin.    FLUoxetine 40 MG capsule Take 1 capsule (40 mg total) by mouth once daily.    fluticasone propionate (FLONASE) 50 mcg/actuation nasal spray fluticasone propionate 50 mcg/actuation nasal spray,suspension    zl-[id number] 1-1-500 mg Cap Rheumate 1 mg-1 mg-500 mg capsule   TAKE ONE CAPSULE BY MOUTH DAILY    oxybutynin (DITROPAN) 5 MG Tab     pregabalin (LYRICA) 100 MG capsule Take 100 mg by mouth 2 (two) times daily.    rizatriptan (MAXALT) 10 MG tablet TAKE 1 TABLET DAILY AS     NEEDED    tamsulosin (FLOMAX) 0.4 mg Cap tamsulosin 0.4 mg capsule   TAKE 1 CAPSULE BY MOUTH AT BEDTIME    traZODone (DESYREL) 50 MG tablet Take 1 tablet (50 mg total) by mouth every evening.    traZODone (DESYREL) 50 MG tablet Take 1 tablet (50 mg total) by mouth every evening.    zolpidem (AMBIEN) 5 MG Tab TAKE 1/2 TO 1 TABLET NIGHTLY     No current facility-administered medications for this visit.       SOCIAL HISTORY  Social History     Tobacco Use    Smoking status: Never Smoker    Smokeless tobacco: Never Used   Substance Use Topics    Alcohol use: No     Alcohol/week: 0.0 standard drinks    Drug use: No       Living arrangements - the patient lives with their family.             Objective:          Neurologic Exam  MS: fluent, normal comprehension and attention  CN: no dysarthria; no facial asymmetry  MOTOR:  moves all limbs well  COORD: normal FTN  GAIT: normal causal gait      Imaging:     Results for orders placed during the hospital encounter of 06/16/21    MRI Brain Demyelinating Without Contrast    Impression  Findings in the cerebral white matter which are typical for multiple sclerosis.  Stable appearance of plaque burden.  No new lesions.  No restricted diffusion.      Electronically signed by: Ruben Blanco DO  Date:    06/16/2021  Time:    13:48    No results found for this or any previous visit.    No results found for this or any previous visit.    Results for orders placed during the hospital encounter of 06/13/22    MRI Brain Demyelinating W W/O Contrast    Impression  Findings in the cerebral white matter which are in keeping with multiple sclerosis.  No new or enhancing lesions to suggest active disease.      Electronically signed by: Twin Cole  Date:    06/13/2022  Time:    18:03    Results for orders placed during the hospital encounter of 06/13/22    MRI Cervical Spine Demyelinating W W/O Contrast    Impression  Increased signal within the cervical spinal cord at C1-2 and T1.  The findings are not significantly changed when compared to the prior exam.    Please note evaluation is significantly limited by susceptibility artifacts from hardware within the cervical spine.      Electronically signed by: Twin Cole  Date:    06/13/2022  Time:    17:58    No results found for this or any previous visit.        Labs:     Lab Results   Component Value Date    [id number] 53 07/15/2021    GOCVUTAZ19YL 60 08/14/2020    GYRUCIWA72EE 56 02/15/2019     No results found for: JCVINDEX, JCVANTIBODY  Lab Results   Component Value Date    XV8EKHUR 68.4 01/04/2022    ABSOLUTECD3 1210 01/04/2022    HR8RAJIW 14.0 01/04/2022    ABSOLUTECD8 248 01/04/2022    TX8SBJRX 54.0 01/04/2022    ABSOLUTECD4 955 01/04/2022    LABCD48 3.85 (H) 01/04/2022     Lab Results   Component Value Date    WBC 7.50 06/17/2022    RBC 4.55  06/17/2022    HGB 13.9 06/17/2022    HCT 43.9 06/17/2022    MCV 97 06/17/2022    MCH 30.5 06/17/2022    MCHC 31.7 (L) 06/17/2022    RDW 12.8 06/17/2022     06/17/2022    MPV 10.3 06/17/2022    GRAN 5.2 06/17/2022    GRAN 69.5 06/17/2022    LYMPH 1.8 06/17/2022    LYMPH 23.3 06/17/2022    MONO 0.4 06/17/2022    MONO 5.1 06/17/2022    EOS 0.1 06/17/2022    BASO 0.03 06/17/2022    EOSINOPHIL 1.3 06/17/2022    BASOPHIL 0.4 06/17/2022     Sodium   Date Value Ref Range Status   07/15/2021 140 136 - 145 mmol/L Final     Potassium   Date Value Ref Range Status   07/15/2021 3.9 3.5 - 5.1 mmol/L Final     Chloride   Date Value Ref Range Status   07/15/2021 106 95 - 110 mmol/L Final     CO2   Date Value Ref Range Status   07/15/2021 24 23 - 29 mmol/L Final     Glucose   Date Value Ref Range Status   07/15/2021 128 (H) 70 - 110 mg/dL Final     BUN   Date Value Ref Range Status   07/15/2021 10 6 - 20 mg/dL Final     Creatinine   Date Value Ref Range Status   07/15/2021 0.7 0.5 - 1.4 mg/dL Final     Calcium   Date Value Ref Range Status   07/15/2021 8.6 (L) 8.7 - 10.5 mg/dL Final     Total Protein   Date Value Ref Range Status   01/04/2022 7.3 6.0 - 8.4 g/dL Final     Albumin   Date Value Ref Range Status   01/04/2022 4.3 3.5 - 5.2 g/dL Final     Total Bilirubin   Date Value Ref Range Status   01/04/2022 0.4 0.1 - 1.0 mg/dL Final     Comment:     For infants and newborns, interpretation of results should be based  on gestational age, weight and in agreement with clinical  observations.    Premature Infant recommended reference ranges:  Up to 24 hours.............<8.0 mg/dL  Up to 48 hours............<12.0 mg/dL  3-5 days..................<15.0 mg/dL  6-29 days.................<15.0 mg/dL       Alkaline Phosphatase   Date Value Ref Range Status   01/04/2022 98 55 - 135 U/L Final     AST   Date Value Ref Range Status   01/04/2022 16 10 - 40 U/L Final     ALT   Date Value Ref Range Status   01/04/2022 15 10 - 44 U/L Final      Anion Gap   Date Value Ref Range Status   07/15/2021 10 8 - 16 mmol/L Final     eGFR if    Date Value Ref Range Status   07/15/2021 >60 >60 mL/min/1.73 m^2 Final     eGFR if non    Date Value Ref Range Status   07/15/2021 >60 >60 mL/min/1.73 m^2 Final     Comment:     Calculation used to obtain the estimated glomerular filtration  rate (eGFR) is the CKD-EPI equation.        Lab Results   Component Value Date    HEPBSAG Negative 06/17/2022    HEPBSAB Positive 06/17/2022    HEPBCAB Negative 06/17/2022           MS Impression and Plan:     NEURO MULTIPLE SCLEROSIS IMPRESSION:   MS Status:     Number of relapses in the past year?:  0    Clinical Progression:  Clinically Stable    MRI Progression:  Stable  Plan:     DMT:  Implement Disease Modifying Therapy    DMT comment:  After shared decision making, will start Ocrevus for MS.  Refer to ID for vaccinations in consideration of chronically immunosuppressed state;  Labs this week.  The rationale, side effects, risks and expectations of this medication were discussed.  Advised COVID booster    Implement Disease Modifying Therapy:  Ocrelizumab    Symptom Management:  No change in symptom management     F/u [facility] in 3mo     She sees NS this week for her back pain         Problem List Items Addressed This Visit        Unprioritized    MS (multiple sclerosis) - Primary    Relevant Orders    CBC Auto Differential (Completed)    Hepatitis A antibody, IgG (Completed)    Hepatitis B Core Antibody, Total (Completed)    Hepatitis B Surface Ab, Qualitative (Completed)    Hepatitis C Antibody (Completed)    Hepatitis B Surface Antigen (Completed)    Quantiferon Gold TB    HIV 1/2 Ag/Ab (4th Gen) (Completed)    Ambulatory referral/consult to Infectious Disease    STRONGYLOIDES IGG ANTIBODIES (Completed)    RPR (Completed)    Varicella Zoster Antibody, IgG (Completed)    Counseling regarding goals of care      Other Visit Diagnoses      Immunosuppression        Relevant Orders    CBC Auto Differential (Completed)    Hepatitis A antibody, IgG (Completed)    Hepatitis B Core Antibody, Total (Completed)    Hepatitis B Surface Ab, Qualitative (Completed)    Hepatitis C Antibody (Completed)    Hepatitis B Surface Antigen (Completed)    Quantiferon Gold TB    HIV 1/2 Ag/Ab (4th Gen) (Completed)    Ambulatory referral/consult to Infectious Disease    STRONGYLOIDES IGG ANTIBODIES (Completed)    RPR (Completed)    Varicella Zoster Antibody, IgG (Completed)          Fauzia Matthews MD

## 2022-06-15 NOTE — TELEPHONE ENCOUNTER
----- Message from Fauzia Matthews MD sent at 6/15/2022 11:47 AM CDT -----  1. Refer to ID --virutal visit  2. Labs this week --Eder Nunez in 3 months thanks

## 2022-06-17 ENCOUNTER — LAB VISIT (OUTPATIENT)
Dept: LAB | Facility: HOSPITAL | Age: 52
End: 2022-06-17
Attending: INTERNAL MEDICINE
Payer: COMMERCIAL

## 2022-06-17 ENCOUNTER — TELEPHONE (OUTPATIENT)
Dept: NEUROLOGY | Facility: CLINIC | Age: 52
End: 2022-06-17

## 2022-06-17 DIAGNOSIS — G35 MS (MULTIPLE SCLEROSIS): ICD-10-CM

## 2022-06-17 DIAGNOSIS — D84.9 IMMUNOSUPPRESSION: ICD-10-CM

## 2022-06-17 LAB
BASOPHILS # BLD AUTO: 0.03 K/UL (ref 0–0.2)
BASOPHILS NFR BLD: 0.4 % (ref 0–1.9)
DIFFERENTIAL METHOD: ABNORMAL
EOSINOPHIL # BLD AUTO: 0.1 K/UL (ref 0–0.5)
EOSINOPHIL NFR BLD: 1.3 % (ref 0–8)
ERYTHROCYTE [DISTWIDTH] IN BLOOD BY AUTOMATED COUNT: 12.8 % (ref 11.5–14.5)
HCT VFR BLD AUTO: 43.9 % (ref 37–48.5)
HGB BLD-MCNC: 13.9 G/DL (ref 12–16)
IMM GRANULOCYTES # BLD AUTO: 0.03 K/UL (ref 0–0.04)
IMM GRANULOCYTES NFR BLD AUTO: 0.4 % (ref 0–0.5)
LYMPHOCYTES # BLD AUTO: 1.8 K/UL (ref 1–4.8)
LYMPHOCYTES NFR BLD: 23.3 % (ref 18–48)
MCH RBC QN AUTO: 30.5 PG (ref 27–31)
MCHC RBC AUTO-ENTMCNC: 31.7 G/DL (ref 32–36)
MCV RBC AUTO: 97 FL (ref 82–98)
MONOCYTES # BLD AUTO: 0.4 K/UL (ref 0.3–1)
MONOCYTES NFR BLD: 5.1 % (ref 4–15)
NEUTROPHILS # BLD AUTO: 5.2 K/UL (ref 1.8–7.7)
NEUTROPHILS NFR BLD: 69.5 % (ref 38–73)
NRBC BLD-RTO: 0 /100 WBC
PLATELET # BLD AUTO: 391 K/UL (ref 150–450)
PMV BLD AUTO: 10.3 FL (ref 9.2–12.9)
RBC # BLD AUTO: 4.55 M/UL (ref 4–5.4)
WBC # BLD AUTO: 7.5 K/UL (ref 3.9–12.7)

## 2022-06-17 PROCEDURE — 86787 VARICELLA-ZOSTER ANTIBODY: CPT | Performed by: PSYCHIATRY & NEUROLOGY

## 2022-06-17 PROCEDURE — 86790 VIRUS ANTIBODY NOS: CPT | Performed by: PSYCHIATRY & NEUROLOGY

## 2022-06-17 PROCEDURE — 86706 HEP B SURFACE ANTIBODY: CPT | Performed by: PSYCHIATRY & NEUROLOGY

## 2022-06-17 PROCEDURE — 86592 SYPHILIS TEST NON-TREP QUAL: CPT | Performed by: PSYCHIATRY & NEUROLOGY

## 2022-06-17 PROCEDURE — 36415 COLL VENOUS BLD VENIPUNCTURE: CPT | Performed by: PSYCHIATRY & NEUROLOGY

## 2022-06-17 PROCEDURE — 87340 HEPATITIS B SURFACE AG IA: CPT | Performed by: PSYCHIATRY & NEUROLOGY

## 2022-06-17 PROCEDURE — 86704 HEP B CORE ANTIBODY TOTAL: CPT | Performed by: PSYCHIATRY & NEUROLOGY

## 2022-06-17 PROCEDURE — 87389 HIV-1 AG W/HIV-1&-2 AB AG IA: CPT | Performed by: PSYCHIATRY & NEUROLOGY

## 2022-06-17 PROCEDURE — 86803 HEPATITIS C AB TEST: CPT | Performed by: PSYCHIATRY & NEUROLOGY

## 2022-06-17 PROCEDURE — 86682 HELMINTH ANTIBODY: CPT | Performed by: PSYCHIATRY & NEUROLOGY

## 2022-06-17 PROCEDURE — 85025 COMPLETE CBC W/AUTO DIFF WBC: CPT | Performed by: PSYCHIATRY & NEUROLOGY

## 2022-06-17 NOTE — TELEPHONE ENCOUNTER
----- Message from Fauzia Matthews MD sent at 6/15/2022 11:48 AM CDT -----  Starting Ocrevus;  labs / ID soon;  paperwork on your desk --please upload and send to her portal so she can sign--we discussed all over VV.  Advised Covid booster soon. thanks

## 2022-06-18 LAB
VARICELLA INTERPRETATION: POSITIVE
VARICELLA ZOSTER IGG: 3.25 ISR (ref 0–0.9)

## 2022-06-20 LAB
HAV IGG SER QL IA: NEGATIVE
HBV CORE AB SERPL QL IA: NEGATIVE
HBV SURFACE AB SER-ACNC: POSITIVE M[IU]/ML
HBV SURFACE AG SERPL QL IA: NEGATIVE
HIV 1+2 AB+HIV1 P24 AG SERPL QL IA: NEGATIVE
RPR SER QL: NORMAL
STRONGYLOIDES ANTIBODY IGG: NEGATIVE

## 2022-06-21 LAB — HCV AB SERPL QL IA: NEGATIVE

## 2022-06-24 ENCOUNTER — PATIENT MESSAGE (OUTPATIENT)
Dept: PSYCHIATRY | Facility: CLINIC | Age: 52
End: 2022-06-24
Payer: COMMERCIAL

## 2022-07-01 NOTE — TELEPHONE ENCOUNTER
Pt scheduled for TB Gold on 7/5.  Ocrevus therapy plan entered.   Ocrevus start form submitted online.

## 2022-07-05 ENCOUNTER — LAB VISIT (OUTPATIENT)
Dept: LAB | Facility: HOSPITAL | Age: 52
End: 2022-07-05
Attending: PSYCHIATRY & NEUROLOGY
Payer: COMMERCIAL

## 2022-07-05 DIAGNOSIS — G35 MS (MULTIPLE SCLEROSIS): ICD-10-CM

## 2022-07-05 DIAGNOSIS — D84.9 IMMUNOSUPPRESSION: ICD-10-CM

## 2022-07-05 PROCEDURE — 86480 TB TEST CELL IMMUN MEASURE: CPT | Performed by: PSYCHIATRY & NEUROLOGY

## 2022-07-06 LAB
GAMMA INTERFERON BACKGROUND BLD IA-ACNC: 0 IU/ML
M TB IFN-G CD4+ BCKGRND COR BLD-ACNC: 0 IU/ML
MITOGEN IGNF BCKGRD COR BLD-ACNC: 0.55 IU/ML
TB GOLD PLUS: NEGATIVE
TB2 - NIL: 0 IU/ML

## 2022-07-12 ENCOUNTER — OFFICE VISIT (OUTPATIENT)
Dept: INFECTIOUS DISEASES | Facility: CLINIC | Age: 52
End: 2022-07-12
Payer: COMMERCIAL

## 2022-07-12 DIAGNOSIS — G35 MS (MULTIPLE SCLEROSIS): ICD-10-CM

## 2022-07-12 DIAGNOSIS — D84.9 IMMUNOSUPPRESSION: ICD-10-CM

## 2022-07-12 PROCEDURE — 99202 PR OFFICE/OUTPT VISIT, NEW, LEVL II, 15-29 MIN: ICD-10-PCS | Mod: 95,,, | Performed by: INTERNAL MEDICINE

## 2022-07-12 PROCEDURE — 99202 OFFICE O/P NEW SF 15 MIN: CPT | Mod: 95,,, | Performed by: INTERNAL MEDICINE

## 2022-07-12 NOTE — PROGRESS NOTES
Subjective:   The patient location is: [place]  The chief complaint leading to consultation is: PreBiologic Assessment    Visit type: audiovisual    Face to Face time with patient: 15 minutes  30 minutes of total time spent on the encounter, which includes face to face time and non-face to face time preparing to see the patient (eg, review of tests), Obtaining and/or reviewing separately obtained history, Documenting clinical information in the electronic or other health record, Independently interpreting results (not separately reported) and communicating results to the patient/family/caregiver, or Care coordination (not separately reported).         Each patient to whom he or she provides medical services by telemedicine is:  (1) informed of the relationship between the physician and patient and the respective role of any other health care provider with respect to management of the patient; and (2) notified that he or she may decline to receive medical services by telemedicine and may withdraw from such care at any time.    Patient ID:Nicolas Garcia is a 52 y.o. female       Chief Complaint:   Chief Complaint   Patient presents with    PreBiologic Assessment       History of Present Illness    A 52-year-old woman with multiple sclerosis who is currently being evaluated prior to starting biologic response modifer (BRM) therapy with Ocrevus. Patient denies any recent fever, chills, or other signs and symptoms concerning for an infective illnesses.    Past Infectious History       YES NO  Sinus Infections  []  [x]   Dental Infections  [x]  []  Chicken Pox     [x]  []                   Shingles             []  [x]   Herpes                []  [x]   Genital Warts     [x]  [] surgically removed  Hepatitis             []  [x]   Syphilis              []  [x]   Gonorrhea          []  [x]   Chlamydia           []  [x]   Fungal Infections  [x]  [] ringworm  Blood Infection   []  [x]       Exposure History                            YES NO  Known tuberculosis exposure  []  [x]       Prior treatment?     States Lived in: [place]    International travel                          [x]  []       Countries visited: [place]       Travel Illness: none  Future International Travel   []  [x]     Pets?                        [x]  []       Animal and count: one dog      Cat litter?     []  [x]       Fish tank?     []  [x]                                   YES NO  Does the patient hike, camp, or spend time in the woods?   [x]  []    Does the patient garden or work with soil?                                          [x]  []    Does the patient consume raw or undercooked meat, fish, or shellfish? []  [x]           Current or past occupations:  Patient hobbies: hunting and fishing    Review of Systems   Constitutional: Negative for chills, decreased appetite, fever, malaise/fatigue, night sweats, weight gain and weight loss.   HENT: Negative for congestion, ear pain, hearing loss, hoarse voice, sore throat and tinnitus.    Eyes: Negative for blurred vision, redness and visual disturbance.   Cardiovascular: Negative for chest pain, leg swelling and palpitations.   Respiratory: Negative for cough, hemoptysis, shortness of breath, sputum production and wheezing.    Hematologic/Lymphatic: Negative for adenopathy. Does not bruise/bleed easily.   Skin: Negative for dry skin, itching, rash and suspicious lesions.   Musculoskeletal: Negative for back pain, joint pain, myalgias and neck pain.   Gastrointestinal: Negative for abdominal pain, constipation, diarrhea, heartburn, nausea and vomiting.   Genitourinary: Negative for dysuria, flank pain, frequency, hematuria, hesitancy and urgency.   Neurological: Negative for dizziness, headaches, numbness, paresthesias and weakness.   Psychiatric/Behavioral: Negative for depression and memory loss. The patient does not have insomnia and is not nervous/anxious.        Objective:     Physical Exam  Vitals and nursing  note reviewed.   Constitutional:       Appearance: She is well-developed.   HENT:      Head: Normocephalic and atraumatic.   Eyes:      General: No scleral icterus.        Right eye: No discharge.         Left eye: No discharge.      Conjunctiva/sclera: Conjunctivae normal.   Pulmonary:      Effort: Pulmonary effort is normal.   Musculoskeletal:         General: Normal range of motion.   Skin:     General: Skin is warm and dry.   Neurological:      Mental Status: She is alert and oriented to person, place, and time.   Psychiatric:         Behavior: Behavior normal.         Thought Content: Thought content normal.         Judgment: Judgment normal.           There is no immunization history on file for this patient.    Hepatitis A Antibody IgG   Date Value Ref Range Status   06/17/2022 Negative  Final     Comment:     IgG anti-HAV not detected.     Hep B S Ab   Date Value Ref Range Status   06/17/2022 Positive  Final     Comment:     Individual is considered immune to HBV infection.     Hepatitis B Surface Ag   Date Value Ref Range Status   06/17/2022 Negative Negative Final     Hep B Core Total Ab   Date Value Ref Range Status   06/17/2022 Negative  Final     Hepatitis C Ab   Date Value Ref Range Status   06/17/2022 Negative Negative Final     Varicella Zoster IgG   Date Value Ref Range Status   06/17/2022 3.25 (H) 0.00 - 0.90 ISR Final     Varicella Interpretation   Date Value Ref Range Status   06/17/2022 Positive (A) Negative Final     Comment:     <or=0.8    Negative        No detectable IgG antibody to Varicella zoster  by the DONNA test. Such individuals are presumed to be   uninfected with Varicella zoster and to be susceptible to   primary infection.    0.9-1.0    Equivocal    >or=1.1    Positive        Indicates presence of detectable IgG antibody to Varicella   zoster by the DONNA test. Indicative of previous or current   infection.       Strongyloides Ab IgG   Date Value Ref Range Status   06/17/2022  Negative Negative Final     Comment:     No detectable levels of IgG antibodies to Strongyloides.  Repeat testing in 1-2 weeks if clinically indicated.    Test Performed by:  [facility]  [address] [place] [address]  : Aman Sharp M.D. Ph.D.; CLIA# [id number]       TB Gold Plus   Date Value Ref Range Status   07/05/2022 Negative Negative Final     Comment:     M. tuberculosis infection NOT likely.     RPR   Date Value Ref Range Status   06/17/2022 Non-reactive Non-reactive Final       Assessment:       ICD-10-CM ICD-9-CM   1. MS (multiple sclerosis)  G35 340   2. Immunosuppression  D84.9 279.9       Plan:   I have reviewed clinic notes, laboratory, imaging tests, and/or pathology from the referring provider and other providers, as indicated for this visit, with my interpretation as documented below.     Patient will get her vaccines via her PCP. She has been advised that any vaccines should be done at least 2 weeks prior to starting Ocrevus.    Vaccine Recommendations:   Pneumonia (Prevnar-20)   Shingles recombinant (Shingrix)    Tetanus (Tdap)   Hepatitis A    Biologic Response Modifier Candidacy: Based on available information, there are no identified significant barriers to BRMs from an infectious disease standpoint pending acceptable serologies.  Final determination of BRM candidacy will be made once evaluation is complete and reviewed.    Counseling: I discussed  the risk for increased susceptibility to infections following BRM therapy including increased risk for infection. The patients has been counseled on the importance of vaccinations including but not limited to a yearly flu vaccine. Specific guidance has been provided to the patient regarding the patient's occupation, hobbies and activities to avoid future infectious complications including but not limited to avoiding undercooked meats and seafood, proper hygiene, and contact  with animals.

## 2022-07-12 NOTE — PATIENT INSTRUCTIONS
Vaccine Recommendations:  Pneumonia (Prevnar-20)  Shingles recombinant (Shingrix)   Tetanus (Tdap)  Hepatitis A      You are at increased risk for infections.   Please ensure all vaccines are up to date including but not limited to a yearly flu vaccine.   Ensure you have had a dental evaluation and routine dental care. Notify your dentist about your immunosuppressed status.   Wash your hands often. Wash your hands before eating, after bathroom use, after handling the trash, and after contact with people and/or pets.  Visitors should wash their hands before and after contact with you.  Avoid anyone who is sick or feeling unwell. They can visit when they are improved.   Avoid any soil or yard work. If you must do so then wear a respirator or mask which can filter out fine particles, however this may not protect you against infections found in soil and plant matter.  Do not smoke marijuana as this can expose you to a fungal infection known as aspergillus.  Avoid eating any raw or undercooked eggs, meat or seafood (including oysters).  Do not consume unpasteurized dairy products, honey or juices.  At restaurants, avoid salad bars, buffets, fresh fruit and raw vegetables as they may not have been handled properly.  Fresh fruits and raw vegetables are allowed at home-wash them thoroughly before eating.   If you have a pet make sure they are healthy and up to date on vaccinations.   You may pet your animal, but wash your hands afterwards.   Your pet should not sleep in bed with you.   Avoid cleaning any cat litter, fish tanks, or bird cages.   If an animal bites you seek care with your primary care physician.  If you plan to travel internationally, visit a travel clinic before you do so and discuss with your physicians.

## 2022-07-15 NOTE — TELEPHONE ENCOUNTER
Spoke with pt regarding plan for vaccines. Pt states she's unsure if she will move forward with Ocrevus. I will follow up with her in a few weeks.

## 2022-12-01 ENCOUNTER — PATIENT MESSAGE (OUTPATIENT)
Dept: PSYCHIATRY | Facility: CLINIC | Age: 52
End: 2022-12-01
Payer: COMMERCIAL

## 2022-12-29 ENCOUNTER — OFFICE VISIT (OUTPATIENT)
Dept: NEUROLOGY | Facility: CLINIC | Age: 52
End: 2022-12-29
Payer: COMMERCIAL

## 2022-12-29 VITALS
WEIGHT: 199.75 LBS | SYSTOLIC BLOOD PRESSURE: 121 MMHG | BODY MASS INDEX: 32.1 KG/M2 | HEIGHT: 66 IN | DIASTOLIC BLOOD PRESSURE: 78 MMHG | HEART RATE: 74 BPM

## 2022-12-29 DIAGNOSIS — H53.9 VISION CHANGES: Primary | ICD-10-CM

## 2022-12-29 DIAGNOSIS — R51.9 FREQUENT HEADACHES: ICD-10-CM

## 2022-12-29 DIAGNOSIS — G47.9 SLEEP DISTURBANCE: ICD-10-CM

## 2022-12-29 DIAGNOSIS — R53.82 CHRONIC FATIGUE: ICD-10-CM

## 2022-12-29 DIAGNOSIS — Z71.89 COUNSELING REGARDING GOALS OF CARE: ICD-10-CM

## 2022-12-29 DIAGNOSIS — G35 MULTIPLE SCLEROSIS: ICD-10-CM

## 2022-12-29 PROCEDURE — 99215 PR OFFICE/OUTPT VISIT, EST, LEVL V, 40-54 MIN: ICD-10-PCS | Mod: S$GLB,,, | Performed by: CLINICAL NURSE SPECIALIST

## 2022-12-29 PROCEDURE — 99215 OFFICE O/P EST HI 40 MIN: CPT | Mod: S$GLB,,, | Performed by: CLINICAL NURSE SPECIALIST

## 2022-12-29 PROCEDURE — 99999 PR PBB SHADOW E&M-EST. PATIENT-LVL IV: ICD-10-PCS | Mod: PBBFAC,,, | Performed by: CLINICAL NURSE SPECIALIST

## 2022-12-29 PROCEDURE — 99999 PR PBB SHADOW E&M-EST. PATIENT-LVL IV: CPT | Mod: PBBFAC,,, | Performed by: CLINICAL NURSE SPECIALIST

## 2022-12-29 RX ORDER — ROSUVASTATIN CALCIUM 10 MG/1
10 TABLET, COATED ORAL NIGHTLY
COMMUNITY
Start: 2022-12-11 | End: 2023-04-24

## 2022-12-29 RX ORDER — SULFAMETHOXAZOLE AND TRIMETHOPRIM 800; 160 MG/1; MG/1
1 TABLET ORAL 2 TIMES DAILY
COMMUNITY
Start: 2022-12-28 | End: 2023-04-24

## 2022-12-29 RX ORDER — NYSTATIN 100000 U/G
CREAM TOPICAL 2 TIMES DAILY
COMMUNITY
Start: 2022-10-24 | End: 2023-11-07

## 2022-12-29 RX ORDER — ME-TETRAHYDROFOLATE/B12/HRB236 1-1-500 MG
1 CAPSULE ORAL DAILY
COMMUNITY
Start: 2022-05-26 | End: 2023-07-24

## 2022-12-29 NOTE — PATIENT INSTRUCTIONS
Magnesium glycinate 400mg an hour before bedtime for sleep and headache prevention.       Message Mayra after the UTI is resolved to discuss fatigue.

## 2022-12-29 NOTE — PROGRESS NOTES
"Subjective:          Patient ID: Kasandra Garcia is a 52 y.o. female who presents today for a routine clinic visit for MS. She was last seen in June 2022. The history has been provided by the patient.      MS HPI:  DMT: None  Side effects from DMT? No  Taking vitamin D3 as recommended? No  She did not start Ocrevus after the last visit.   She saw neurosurgery for back pain. MRI showed a "slipped disc." She went through physical therapy and took muscle relaxers. Pain is improved, but not resolved. It's tolerable.   She does her exercises at home when her pain increases.   She denies any new or different MS symptoms.   She had COVID before Christmas. This was the second time she had it.   She had a UTI 6 weeks ago and another one currently.   She stopped Tecfidera in February. When she first got off the medication, she felt better. Lately, she has not felt as well.   She babysits her 6 month old grandson every day.   Fatigue is her most significant symptom. She has tried Adderall, amantadine, and Provigil in the past. She was very "uptight about MS" at that time, so she is not sure if these meds worked or not. Co-Q-10 was not helpful. Fatigue is worse when she has a UTI.   She has headaches quite often and takes Maxalt as needed. She denies any nausea/vomiting with headaches but has some mild light sensitivity. She describes these a stress/tension headache.     Medications:  Current Outpatient Medications   Medication Sig    cholecalciferol, vitamin D3, (VITAMIN D3) 5,000 unit Tab Take 5,000 Units by mouth once daily. Alternate 5000 IU and 10,000 IU every other day.    cranberry extract 500 mg Cap Take by mouth.    diazePAM (VALIUM) 5 MG tablet Take 3 tabs po 45 min prior to MRI    dimethyl fumarate 240 mg CpDR TAKE 1 CAPSULE BY MOUTH TWICE DAILY. STORE IN ORIGINAL CONTAINER AT ROOM TEMP.    estradiol 0.05 mg/24 hr td ptsw (VIVELLE-DOT) 0.05 mg/24 hr Place onto the skin.    FLUoxetine 40 MG capsule Take 1 capsule (40 " mg total) by mouth once daily.    fluticasone propionate (FLONASE) 50 mcg/actuation nasal spray fluticasone propionate 50 mcg/actuation nasal spray,suspension    [id number] 1-1-500 mg Cap Rheumate 1 mg-1 mg-500 mg capsule   TAKE ONE CAPSULE BY MOUTH DAILY    oxybutynin (DITROPAN) 5 MG Tab     pregabalin (LYRICA) 100 MG capsule Take 100 mg by mouth 2 (two) times daily.    rizatriptan (MAXALT) 10 MG tablet TAKE 1 TABLET DAILY AS     NEEDED    tamsulosin (FLOMAX) 0.4 mg Cap tamsulosin 0.4 mg capsule   TAKE 1 CAPSULE BY MOUTH AT BEDTIME    traZODone (DESYREL) 50 MG tablet Take 1 tablet (50 mg total) by mouth every evening.    traZODone (DESYREL) 50 MG tablet Take 1 tablet (50 mg total) by mouth every evening.    zolpidem (AMBIEN) 5 MG Tab TAKE 1/2 TO 1 TABLET NIGHTLY       SOCIAL HISTORY  Social History     Tobacco Use    Smoking status: Never    Smokeless tobacco: Never   Substance Use Topics    Alcohol use: No     Alcohol/week: 0.0 standard drinks    Drug use: No       Living arrangements - the patient lives with their family.    ROS:  REVIEW OF SYMPTOMS 12/26/2022   Do you feel abnormally tired on most days? Yes--she takes a nap most days   Do you feel you generally sleep well? Yes--she has insomnia after COVID; it is getting better now    Do you have difficulty controlling your bladder?  Yes--UTI currently; she has had more frequency and urgency lately and trouble emptying. She had some leakage yesterday.    Do you have difficulty controlling your bowels?  Yes--she has constipation; she takes Nixon Milk of Magnesia pills, which are helpful.   Do you have frequent muscle cramps, tightness or spasms in your limbs?  Yes--she has tightness in her neck and in between her shoulder blades, as well as lower back into hips    Do you have new visual symptoms?  No--she thinks vision changes are eye related. She does not have an eye doctor.   Do you have worsening difficulty with your memory or thinking?  "Yes--she feels like there has been short-term memory changes; she forget to pick her grandson up from school last week. She does not feel like her comprehension is different than last year, but it's not as good as it was when she was younger. She does not read as much as she used to.    Do you have worsening symptoms of anxiety or depression?  Yes--depressed because she is so tired all the time. She takes fluoxetine and feels like this is helpful.    For patients who walk, Do you have more difficulty walking?  No   Have you fallen since your last visit?  Yes--she has had 2-3 falls mostly with doing things out of her realm/routine. She states "i'm always trying to do something stupid."   For patients who use wheelchairs: Do you have any skin wounds or breakdown? No   Do you have difficulty using your hands?  No--plays the piano    Do you have shooting or burning pain? Yes--Lyrica is helpful for pain.    Do you have difficulty with sexual function?  Yes   If you are sexually active, are you using birth control? Y/N  N/A No   Do you often choke when swallowing liquids or solid food?  No   Do you experience worsening symptoms when overheated? No   Do you need any new equipment such as a wheelchair, walker or shower chair? No   Do you receive co-pay financial assistance for your principal MS medicine? Not Applicable   Would you be interested in participating in an MS research trial in the future? No   For patients on Gilenya, Tecfidera, Aubagio, Rituxan, Ocrevus, Tysabri, Lemtrada or Methotrexate, are you aware that you should NOT receive live virus vaccines?  Not Applicable   Do you feel you have adequate family/friend support?  Yes   Do you have health insurance?   Yes   Are you currently employed? No   Do you receive SSDI/SSI?  No   Do you use marijuana or cannabis products? No   Have you been diagnosed with a urinary tract infection since your last visit here? Yes   Have you been diagnosed with a respiratory tract " infection since your last visit here? Yes   Have you been to the emergency room since your last visit here? No   Have you been hospitalized since your last visit here?  No            Objective:        1. 25 foot timed walk:   Timed 25 Foot Walk: 2022   Did patient wear an AFO? No No   Was assistive device used? No No   Time for 25 Foot Walk (seconds) 3.4 3.5   Time for 25 Foot Walk (seconds) 3.7 3.6       Neurologic Exam     Mental Status   Oriented to person, place, and time.   Follows 3 step commands.   Speech: speech is normal   Level of consciousness: alert  Normal comprehension.     Cranial Nerves     CN II   Visual acuity: normal    CN III, IV, VI   Pupils are equal, round, and reactive to light.  Extraocular motions are normal.     CN V   Facial sensation intact.     CN VII   Facial expression full, symmetric.     CN VIII   Hearing: intact (finger rub)    CN IX, X   Palate: symmetric    CN XI   CN XI normal.     CN XII   Tongue deviation: none    Motor Exam   Muscle bulk: normal  Overall muscle tone: normal    Strength   Right neck flexion: 5/5  Left neck flexion: 5/5  Right neck extension: 5/5  Left neck extension: 5/5  Right deltoid: 5/5  Left deltoid: 5/5  Right biceps: 5/5  Left biceps: 5/5  Right triceps: 5/5  Left triceps: 5/5  Right wrist flexion: 5/5  Left wrist flexion: 5/5  Right wrist extension: 5/5  Left wrist extension: 5/5  Right interossei: 5/5  Left interossei: 5/5  Right iliopsoas: 5/5  Left iliopsoas: 5/5  Right quadriceps: 5/5  Left quadriceps: 5/5  Right hamstrin/5  Left hamstrin/5  Right anterior tibial: 5/5  Left anterior tibial: 5/5  Right gastroc: 5/5  Left gastroc: 5/5L HF 5-/5     Sensory Exam   Right arm light touch: tingling in fingers.  Left arm light touch: tingling in fingers.  Right leg light touch: tingling in toes.  Left leg light touch: tingling in toes.  Right arm vibration: normal  Left arm vibration: normal  Right leg vibration: decreased from  toes  Left leg vibration: decreased from toes         Gait, Coordination, and Reflexes     Gait  Gait: normal    Coordination   Romberg: negative  Finger-nose-finger test: slightly slowed on L UE.  Heel to shin coordination: normal  Tandem walking coordination: normal    Tremor   Resting tremor: absent  Action tremor: absent    Reflexes   Right brachioradialis: 2+  Left brachioradialis: 2+  Right biceps: 2+  Left biceps: 2+  Right triceps: 2+  Left triceps: 2+  Right patellar: 2+  Left patellar: 2+  Right achilles: 2+  Left achilles: 2+  Right plantar: equivocal  Left plantar: equivocal  Right ankle clonus: absent  Right pendular knee jerk: absent  Left pendular knee jerk: absent  Normal RSM in UE and LE   She can walk on toes and heels.   She can hop on right leg, but this is difficult on the left leg     Does not feel vibration in right leg  Balance is good   Imaging:     Results for orders placed during the hospital encounter of 06/16/21    MRI Brain Demyelinating Without Contrast    Impression  Findings in the cerebral white matter which are typical for multiple sclerosis.  Stable appearance of plaque burden.  No new lesions.  No restricted diffusion.      Electronically signed by: Ruben Blanco DO  Date:    06/16/2021  Time:    13:48    Results for orders placed during the hospital encounter of 06/13/22    MRI Brain Demyelinating W W/O Contrast    Impression  Findings in the cerebral white matter which are in keeping with multiple sclerosis.  No new or enhancing lesions to suggest active disease.      Electronically signed by: Twin Cole  Date:    06/13/2022  Time:    18:03    Results for orders placed during the hospital encounter of 06/13/22    MRI Cervical Spine Demyelinating W W/O Contrast    Impression  Increased signal within the cervical spinal cord at C1-2 and T1.  The findings are not significantly changed when compared to the prior exam.    Please note evaluation is significantly limited by  susceptibility artifacts from hardware within the cervical spine.      Electronically signed by: Twin Cole  Date:    06/13/2022  Time:    17:58    No results found for this or any previous visit.        Labs:     Lab Results   Component Value Date    [id number] 53 07/15/2021    KNJCERJU86KG 60 08/14/2020    [id number] 56 02/15/2019     No results found for: JCVINDEX, JCVANTIBODY  Lab Results   Component Value Date    DQ9NIAPU 68.4 01/04/2022    ABSOLUTECD3 1210 01/04/2022    NN2UNFJR 14.0 01/04/2022    ABSOLUTECD8 248 01/04/2022    SN6NQWHI 54.0 01/04/2022    ABSOLUTECD4 955 01/04/2022    LABCD48 3.85 (H) 01/04/2022     Lab Results   Component Value Date    WBC 7.50 06/17/2022    RBC 4.55 06/17/2022    HGB 13.9 06/17/2022    HCT 43.9 06/17/2022    MCV 97 06/17/2022    MCH 30.5 06/17/2022    MCHC 31.7 (L) 06/17/2022    RDW 12.8 06/17/2022     06/17/2022    MPV 10.3 06/17/2022    GRAN 5.2 06/17/2022    GRAN 69.5 06/17/2022    LYMPH 1.8 06/17/2022    LYMPH 23.3 06/17/2022    MONO 0.4 06/17/2022    MONO 5.1 06/17/2022    EOS 0.1 06/17/2022    BASO 0.03 06/17/2022    EOSINOPHIL 1.3 06/17/2022    BASOPHIL 0.4 06/17/2022     Sodium   Date Value Ref Range Status   07/15/2021 140 136 - 145 mmol/L Final     Potassium   Date Value Ref Range Status   07/15/2021 3.9 3.5 - 5.1 mmol/L Final     Chloride   Date Value Ref Range Status   07/15/2021 106 95 - 110 mmol/L Final     CO2   Date Value Ref Range Status   07/15/2021 24 23 - 29 mmol/L Final     Glucose   Date Value Ref Range Status   07/15/2021 128 (H) 70 - 110 mg/dL Final     BUN   Date Value Ref Range Status   07/15/2021 10 6 - 20 mg/dL Final     Creatinine   Date Value Ref Range Status   07/15/2021 0.7 0.5 - 1.4 mg/dL Final     Calcium   Date Value Ref Range Status   07/15/2021 8.6 (L) 8.7 - 10.5 mg/dL Final     Total Protein   Date Value Ref Range Status   01/04/2022 7.3 6.0 - 8.4 g/dL Final     Albumin   Date Value Ref Range Status   01/04/2022 4.3 3.5 - 5.2  g/dL Final     Total Bilirubin   Date Value Ref Range Status   01/04/2022 0.4 0.1 - 1.0 mg/dL Final     Comment:     For infants and newborns, interpretation of results should be based  on gestational age, weight and in agreement with clinical  observations.    Premature Infant recommended reference ranges:  Up to 24 hours.............<8.0 mg/dL  Up to 48 hours............<12.0 mg/dL  3-5 days..................<15.0 mg/dL  6-29 days.................<15.0 mg/dL       Alkaline Phosphatase   Date Value Ref Range Status   01/04/2022 98 55 - 135 U/L Final     AST   Date Value Ref Range Status   01/04/2022 16 10 - 40 U/L Final     ALT   Date Value Ref Range Status   01/04/2022 15 10 - 44 U/L Final     Anion Gap   Date Value Ref Range Status   07/15/2021 10 8 - 16 mmol/L Final     eGFR if    Date Value Ref Range Status   07/15/2021 >60 >60 mL/min/1.73 m^2 Final     eGFR if non    Date Value Ref Range Status   07/15/2021 >60 >60 mL/min/1.73 m^2 Final     Comment:     Calculation used to obtain the estimated glomerular filtration  rate (eGFR) is the CKD-EPI equation.        Lab Results   Component Value Date    HEPBSAG Negative 06/17/2022    HEPBSAB Positive 06/17/2022    HEPBCAB Negative 06/17/2022       MS Impression and Plan:     NEURO MULTIPLE SCLEROSIS IMPRESSION:   MS Status:     Clinical Progression:  Clinically Stable  Plan:     Symptom Management:  Implement change in symptom management    Implement Change in Symptom Management:  Vision, Pain and Sleep (Referral placed to optometry. Will consider modafinil for fatigue if it is still severe once UTI is resolved. DIscussed magnesium glycinate 400mg at bedtime for sleep and headache prevention.)       We discussed disease modifying therapy, including Kesimpta. She does not want to make a decision today.       MRI brain is due in June. She will follow up with Dr. Matthews in 3-4 months.     Total time spent with patient: 38 minutes  Total  time spent on encounter: 45 minutes       CARYN Jack-BC, MSCN      Problem List Items Addressed This Visit    None  Visit Diagnoses       Vision changes    -  Primary    Relevant Orders    Ambulatory referral/consult to Optometry        \    SHALOM Donohue, CNS

## 2023-01-11 ENCOUNTER — PATIENT MESSAGE (OUTPATIENT)
Dept: NEUROLOGY | Facility: CLINIC | Age: 53
End: 2023-01-11
Payer: COMMERCIAL

## 2023-01-24 ENCOUNTER — PATIENT MESSAGE (OUTPATIENT)
Dept: NEUROLOGY | Facility: CLINIC | Age: 53
End: 2023-01-24
Payer: COMMERCIAL

## 2023-02-20 ENCOUNTER — PATIENT MESSAGE (OUTPATIENT)
Dept: PSYCHIATRY | Facility: CLINIC | Age: 53
End: 2023-02-20
Payer: COMMERCIAL

## 2023-04-24 ENCOUNTER — LAB VISIT (OUTPATIENT)
Dept: LAB | Facility: HOSPITAL | Age: 53
End: 2023-04-24
Attending: PSYCHIATRY & NEUROLOGY
Payer: COMMERCIAL

## 2023-04-24 ENCOUNTER — OFFICE VISIT (OUTPATIENT)
Dept: NEUROLOGY | Facility: CLINIC | Age: 53
End: 2023-04-24
Payer: COMMERCIAL

## 2023-04-24 ENCOUNTER — CLINICAL SUPPORT (OUTPATIENT)
Dept: INFECTIOUS DISEASES | Facility: CLINIC | Age: 53
End: 2023-04-24
Payer: COMMERCIAL

## 2023-04-24 VITALS
DIASTOLIC BLOOD PRESSURE: 71 MMHG | BODY MASS INDEX: 32.19 KG/M2 | HEART RATE: 76 BPM | SYSTOLIC BLOOD PRESSURE: 105 MMHG | WEIGHT: 200.31 LBS | HEIGHT: 66 IN

## 2023-04-24 DIAGNOSIS — N31.9 NEUROGENIC BLADDER: ICD-10-CM

## 2023-04-24 DIAGNOSIS — D84.821 IMMUNOSUPPRESSION DUE TO DRUG THERAPY: ICD-10-CM

## 2023-04-24 DIAGNOSIS — Z79.899 IMMUNOSUPPRESSION DUE TO DRUG THERAPY: ICD-10-CM

## 2023-04-24 DIAGNOSIS — G35 MULTIPLE SCLEROSIS: Primary | ICD-10-CM

## 2023-04-24 DIAGNOSIS — G35 MULTIPLE SCLEROSIS: ICD-10-CM

## 2023-04-24 DIAGNOSIS — Z71.89 COUNSELING REGARDING GOALS OF CARE: ICD-10-CM

## 2023-04-24 LAB
BASOPHILS # BLD AUTO: 0.04 K/UL (ref 0–0.2)
BASOPHILS NFR BLD: 0.6 % (ref 0–1.9)
DIFFERENTIAL METHOD: ABNORMAL
EOSINOPHIL # BLD AUTO: 0 K/UL (ref 0–0.5)
EOSINOPHIL NFR BLD: 0.4 % (ref 0–8)
ERYTHROCYTE [DISTWIDTH] IN BLOOD BY AUTOMATED COUNT: 12.3 % (ref 11.5–14.5)
HBV CORE AB SERPL QL IA: NORMAL
HBV SURFACE AG SERPL QL IA: NORMAL
HCT VFR BLD AUTO: 44.6 % (ref 37–48.5)
HGB BLD-MCNC: 14.2 G/DL (ref 12–16)
IGA SERPL-MCNC: 477 MG/DL (ref 40–350)
IGG SERPL-MCNC: 1058 MG/DL (ref 650–1600)
IGM SERPL-MCNC: 69 MG/DL (ref 50–300)
IMM GRANULOCYTES # BLD AUTO: 0.03 K/UL (ref 0–0.04)
IMM GRANULOCYTES NFR BLD AUTO: 0.4 % (ref 0–0.5)
LYMPHOCYTES # BLD AUTO: 1.6 K/UL (ref 1–4.8)
LYMPHOCYTES NFR BLD: 23.9 % (ref 18–48)
MCH RBC QN AUTO: 29.9 PG (ref 27–31)
MCHC RBC AUTO-ENTMCNC: 31.8 G/DL (ref 32–36)
MCV RBC AUTO: 94 FL (ref 82–98)
MONOCYTES # BLD AUTO: 0.5 K/UL (ref 0.3–1)
MONOCYTES NFR BLD: 6.8 % (ref 4–15)
NEUTROPHILS # BLD AUTO: 4.6 K/UL (ref 1.8–7.7)
NEUTROPHILS NFR BLD: 67.9 % (ref 38–73)
NRBC BLD-RTO: 0 /100 WBC
PLATELET # BLD AUTO: 370 K/UL (ref 150–450)
PMV BLD AUTO: 9.6 FL (ref 9.2–12.9)
RBC # BLD AUTO: 4.75 M/UL (ref 4–5.4)
WBC # BLD AUTO: 6.74 K/UL (ref 3.9–12.7)

## 2023-04-24 PROCEDURE — 90750 HZV VACC RECOMBINANT IM: CPT | Mod: S$GLB,,, | Performed by: INTERNAL MEDICINE

## 2023-04-24 PROCEDURE — 36415 COLL VENOUS BLD VENIPUNCTURE: CPT | Performed by: PSYCHIATRY & NEUROLOGY

## 2023-04-24 PROCEDURE — 90715 TDAP VACCINE 7 YRS/> IM: CPT | Mod: S$GLB,,, | Performed by: INTERNAL MEDICINE

## 2023-04-24 PROCEDURE — 90715 TDAP VACCINE GREATER THAN OR EQUAL TO 7YO IM: ICD-10-PCS | Mod: S$GLB,,, | Performed by: INTERNAL MEDICINE

## 2023-04-24 PROCEDURE — 99215 OFFICE O/P EST HI 40 MIN: CPT | Mod: S$GLB,,, | Performed by: PSYCHIATRY & NEUROLOGY

## 2023-04-24 PROCEDURE — 90632 HEPA VACCINE ADULT IM: CPT | Mod: S$GLB,,, | Performed by: INTERNAL MEDICINE

## 2023-04-24 PROCEDURE — 85025 COMPLETE CBC W/AUTO DIFF WBC: CPT | Performed by: PSYCHIATRY & NEUROLOGY

## 2023-04-24 PROCEDURE — 87340 HEPATITIS B SURFACE AG IA: CPT | Performed by: PSYCHIATRY & NEUROLOGY

## 2023-04-24 PROCEDURE — 99999 PR PBB SHADOW E&M-EST. PATIENT-LVL IV: CPT | Mod: PBBFAC,,, | Performed by: PSYCHIATRY & NEUROLOGY

## 2023-04-24 PROCEDURE — 90471 IMMUNIZATION ADMIN: CPT | Mod: S$GLB,,, | Performed by: INTERNAL MEDICINE

## 2023-04-24 PROCEDURE — 90677 PNEUMOCOCCAL CONJUGATE VACCINE 20-VALENT: ICD-10-PCS | Mod: S$GLB,,, | Performed by: INTERNAL MEDICINE

## 2023-04-24 PROCEDURE — 82784 ASSAY IGA/IGD/IGG/IGM EACH: CPT | Performed by: PSYCHIATRY & NEUROLOGY

## 2023-04-24 PROCEDURE — 90472 ZOSTER RECOMBINANT VACCINE: ICD-10-PCS | Mod: S$GLB,,, | Performed by: INTERNAL MEDICINE

## 2023-04-24 PROCEDURE — 86704 HEP B CORE ANTIBODY TOTAL: CPT | Performed by: PSYCHIATRY & NEUROLOGY

## 2023-04-24 PROCEDURE — 90471 HEPATITIS A VACCINE ADULT IM: ICD-10-PCS | Mod: S$GLB,,, | Performed by: INTERNAL MEDICINE

## 2023-04-24 PROCEDURE — 99999 PR PBB SHADOW E&M-EST. PATIENT-LVL IV: ICD-10-PCS | Mod: PBBFAC,,, | Performed by: PSYCHIATRY & NEUROLOGY

## 2023-04-24 PROCEDURE — 90677 PCV20 VACCINE IM: CPT | Mod: S$GLB,,, | Performed by: INTERNAL MEDICINE

## 2023-04-24 PROCEDURE — 99215 PR OFFICE/OUTPT VISIT, EST, LEVL V, 40-54 MIN: ICD-10-PCS | Mod: S$GLB,,, | Performed by: PSYCHIATRY & NEUROLOGY

## 2023-04-24 PROCEDURE — 90472 IMMUNIZATION ADMIN EACH ADD: CPT | Mod: S$GLB,,, | Performed by: INTERNAL MEDICINE

## 2023-04-24 PROCEDURE — 90750 ZOSTER RECOMBINANT VACCINE: ICD-10-PCS | Mod: S$GLB,,, | Performed by: INTERNAL MEDICINE

## 2023-04-24 PROCEDURE — 90632 HEPATITIS A VACCINE ADULT IM: ICD-10-PCS | Mod: S$GLB,,, | Performed by: INTERNAL MEDICINE

## 2023-04-24 RX ORDER — METHENAMINE HIPPURATE 1000 MG/1
1 TABLET ORAL 2 TIMES DAILY
Qty: 60 TABLET | Refills: 5 | Status: SHIPPED | OUTPATIENT
Start: 2023-04-24 | End: 2023-07-24

## 2023-04-24 RX ORDER — TAMSULOSIN HYDROCHLORIDE 0.4 MG/1
1 CAPSULE ORAL DAILY
COMMUNITY
Start: 2023-03-24 | End: 2024-04-01

## 2023-04-24 RX ORDER — NITROFURANTOIN MACROCRYSTALS 50 MG/1
50 CAPSULE ORAL DAILY
COMMUNITY
Start: 2023-03-25 | End: 2023-11-07

## 2023-04-24 RX ORDER — ESTRADIOL 0.05 MG/D
FILM, EXTENDED RELEASE TRANSDERMAL
COMMUNITY
Start: 2023-02-06

## 2023-04-24 NOTE — Clinical Note
Pt now willing to proceed with Ocrevus; need to appeal the denial I suppose? Vaccines today and some updated labs too

## 2023-04-24 NOTE — PROGRESS NOTES
"Subjective:          Patient ID: Kasandra Garcia is a 52 y.o. female who presents today for a routine clinic visit for MS.      MS HPI:  DMT: None  Side effects from DMT? No  Taking vitamin D3 as recommended? Yes -   She continues to be ambivalent about starting DMT. She states she had a sense of "impending doom" with these DMTs -- concern about PML.    Stopped Lakesha last year, and felt better.   But, has felt more fatigue and pain in recent months.   She has fibromyalgia.  Lyrica helps, but she admits she's not been exercising or eating right lately.    She has a negative sleep study in 2016, but she admits she does snore heavily.   Has testing positive for insulin resistance and hoping to start a new medication and lose weight soon.   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 Terrell -- urologist;   No longer taking ditropan.    Feeling better since starting the daily antibiotic   Not interested in going back to daily shots; has taken them and had lipoatrophy with GA and flu like sx with Avonex.     Medications:  Current Outpatient Medications   Medication Sig    cholecalciferol, vitamin D3, (VITAMIN D3) 5,000 unit Tab Take 5,000 Units by mouth once daily. Alternate 5000 IU and 10,000 IU every other day.    cranberry extract 500 mg Cap Take by mouth.    estradiol 0.05 mg/24 hr td ptsw (VIVELLE-DOT) 0.05 mg/24 hr     fluticasone propionate (FLONASE) 50 mcg/actuation nasal spray fluticasone propionate 50 mcg/actuation nasal spray,suspension    levomefolate-B12-herb 236 (RHEUMATE) 1-1-500 mg Cap Take 1 capsule by mouth once daily.    nitrofurantoin (MACRODANTIN) 50 MG capsule Take 50 mg by mouth.    nystatin (MYCOSTATIN) cream Apply topically 2 (two) times daily.    pregabalin (LYRICA) 100 MG capsule Take 100 mg by mouth 2 (two) times daily.    rizatriptan (MAXALT) 10 MG tablet TAKE 1 TABLET DAILY AS     NEEDED    tamsulosin (FLOMAX) 0.4 mg Cap Take 1 capsule by mouth.    traZODone (DESYREL) " 50 MG tablet TAKE 1 TABLET EVERY EVENING    FLUoxetine 40 MG capsule Take 1 capsule (40 mg total) by mouth once daily.       SOCIAL HISTORY  Social History     Tobacco Use    Smoking status: Never    Smokeless tobacco: Never   Substance Use Topics    Alcohol use: No     Alcohol/week: 0.0 standard drinks    Drug use: No       Living arrangements - the patient lives with their family.    REVIEW OF SYMPTOMS 4/21/2023   Do you feel abnormally tired on most days? Yes   Do you feel you generally sleep well? Yes   Do you have difficulty controlling your bladder?  Yes   Do you have difficulty controlling your bowels?  No   Do you have frequent muscle cramps, tightness or spasms in your limbs?  Yes   Do you have new visual symptoms?  No   Do you have worsening difficulty with your memory or thinking? No   Do you have worsening symptoms of anxiety or depression?  Yes   For patients who walk, Do you have more difficulty walking?  No   Have you fallen since your last visit?  Yes   For patients who use wheelchairs: Do you have any skin wounds or breakdown? Not Applicable   Do you have difficulty using your hands?  No   Do you have shooting or burning pain? Yes   Do you have difficulty with sexual function?  Yes   If you are sexually active, are you using birth control? Y/N  N/A No   Do you often choke when swallowing liquids or solid food?  No   Do you experience worsening symptoms when overheated? Yes   Do you need any new equipment such as a wheelchair, walker or shower chair? No   Do you receive co-pay financial assistance for your principal MS medicine? Not Applicable   Would you be interested in participating in an MS research trial in the future? Yes   For patients on Gilenya, Tecfidera, Aubagio, Rituxan, Ocrevus, Tysabri, Lemtrada or Methotrexate, are you aware that you should NOT receive live virus vaccines?  Not Applicable   Do you feel you have adequate family/friend support?  Yes   Do you have health insurance?   Yes    Are you currently employed? No   Do you receive SSDI/SSI?  No   Do you use marijuana or cannabis products? No   Have you been diagnosed with a urinary tract infection since your last visit here? Yes   Have you been diagnosed with a respiratory tract infection since your last visit here? No   Have you been to the emergency room since your last visit here? No   Have you been hospitalized since your last visit here?  No                Objective:      Timed 25 Foot Walk: 12/29/2022 4/24/2023   Did patient wear an AFO? No No   Was assistive device used? No No   Time for 25 Foot Walk (seconds) 3.5 3.7   Time for 25 Foot Walk (seconds) 3.6 -     Neurologic Exam    MENTAL STATUS: grossly intact  CRANIAL NERVE EXAM: There is no internuclear ophthalmoplegia. Extraocular   muscles are intact.  No facial   asymmetry.There is no dysarthria.   MOTOR EXAM: Normal bulk and tone throughout UE and LE bilaterally. Rapid sequential movements are normal. Strength is 5/5 in all groups   in the lower extremities and upper extremities.   REFLEXES: Symmetric and 2+ throughout in all 4 extremities.   SENSORY EXAM: moderate decrease in vibration in distal LE mani  COORDINATION: Normal finger-to-nose exam.   GAIT: Narrow based and stable.      Imaging:     Results for orders placed during the hospital encounter of 06/16/21    MRI Brain Demyelinating Without Contrast    Impression  Findings in the cerebral white matter which are typical for multiple sclerosis.  Stable appearance of plaque burden.  No new lesions.  No restricted diffusion.      Electronically signed by: Ruben Blanco DO  Date:    06/16/2021  Time:    13:48    No results found for this or any previous visit.    No results found for this or any previous visit.    Results for orders placed during the hospital encounter of 06/13/22    MRI Brain Demyelinating W W/O Contrast    Impression  Findings in the cerebral white matter which are in keeping with multiple sclerosis.  No new or  enhancing lesions to suggest active disease.      Electronically signed by: Twin Cole  Date:    06/13/2022  Time:    18:03    Results for orders placed during the hospital encounter of 06/13/22    MRI Cervical Spine Demyelinating W W/O Contrast    Impression  Increased signal within the cervical spinal cord at C1-2 and T1.  The findings are not significantly changed when compared to the prior exam.    Please note evaluation is significantly limited by susceptibility artifacts from hardware within the cervical spine.      Electronically signed by: Twin Cole  Date:    06/13/2022  Time:    17:58    No results found for this or any previous visit.    Labs:     Lab Results   Component Value Date    NMNYTNSA80IG 53 07/15/2021    HIISUIAN76AB 60 08/14/2020    TAENSQQI63QY 56 02/15/2019     No results found for: JCVINDEX, JCVANTIBODY  Lab Results   Component Value Date    VD2QVYTO 68.4 01/04/2022    ABSOLUTECD3 1210 01/04/2022    LP5LWQDV 14.0 01/04/2022    ABSOLUTECD8 248 01/04/2022    HU2AIXGK 54.0 01/04/2022    ABSOLUTECD4 955 01/04/2022    LABCD48 3.85 (H) 01/04/2022     Lab Results   Component Value Date    WBC 6.74 04/24/2023    RBC 4.75 04/24/2023    HGB 14.2 04/24/2023    HCT 44.6 04/24/2023    MCV 94 04/24/2023    MCH 29.9 04/24/2023    MCHC 31.8 (L) 04/24/2023    RDW 12.3 04/24/2023     04/24/2023    MPV 9.6 04/24/2023    GRAN 4.6 04/24/2023    GRAN 67.9 04/24/2023    LYMPH 1.6 04/24/2023    LYMPH 23.9 04/24/2023    MONO 0.5 04/24/2023    MONO 6.8 04/24/2023    EOS 0.0 04/24/2023    BASO 0.04 04/24/2023    EOSINOPHIL 0.4 04/24/2023    BASOPHIL 0.6 04/24/2023     Sodium   Date Value Ref Range Status   07/15/2021 140 136 - 145 mmol/L Final     Potassium   Date Value Ref Range Status   07/15/2021 3.9 3.5 - 5.1 mmol/L Final     Chloride   Date Value Ref Range Status   07/15/2021 106 95 - 110 mmol/L Final     CO2   Date Value Ref Range Status   07/15/2021 24 23 - 29 mmol/L Final     Glucose   Date Value Ref  Range Status   07/15/2021 128 (H) 70 - 110 mg/dL Final     BUN   Date Value Ref Range Status   07/15/2021 10 6 - 20 mg/dL Final     Creatinine   Date Value Ref Range Status   07/15/2021 0.7 0.5 - 1.4 mg/dL Final     Calcium   Date Value Ref Range Status   07/15/2021 8.6 (L) 8.7 - 10.5 mg/dL Final     Total Protein   Date Value Ref Range Status   01/04/2022 7.3 6.0 - 8.4 g/dL Final     Albumin   Date Value Ref Range Status   01/04/2022 4.3 3.5 - 5.2 g/dL Final     Total Bilirubin   Date Value Ref Range Status   01/04/2022 0.4 0.1 - 1.0 mg/dL Final     Comment:     For infants and newborns, interpretation of results should be based  on gestational age, weight and in agreement with clinical  observations.    Premature Infant recommended reference ranges:  Up to 24 hours.............<8.0 mg/dL  Up to 48 hours............<12.0 mg/dL  3-5 days..................<15.0 mg/dL  6-29 days.................<15.0 mg/dL       Alkaline Phosphatase   Date Value Ref Range Status   01/04/2022 98 55 - 135 U/L Final     AST   Date Value Ref Range Status   01/04/2022 16 10 - 40 U/L Final     ALT   Date Value Ref Range Status   01/04/2022 15 10 - 44 U/L Final     Anion Gap   Date Value Ref Range Status   07/15/2021 10 8 - 16 mmol/L Final     eGFR if    Date Value Ref Range Status   07/15/2021 >60 >60 mL/min/1.73 m^2 Final     eGFR if non    Date Value Ref Range Status   07/15/2021 >60 >60 mL/min/1.73 m^2 Final     Comment:     Calculation used to obtain the estimated glomerular filtration  rate (eGFR) is the CKD-EPI equation.        Lab Results   Component Value Date    HEPBSAG Non-reactive 04/24/2023    HEPBSAB Positive 06/17/2022    HEPBCAB Non-reactive 04/24/2023           MS Impression and Plan:     NEURO MULTIPLE SCLEROSIS IMPRESSION:   MS Status:     Number of relapses in the past year?:  0    Clinical Progression:  Clinically Stable    MRI Progression:  Stable  Plan:     DMT:  Implement Disease  Modifying Therapy    Implement Disease Modifying Therapy:  None and ocrelizumab    Symptom Management:  Implement change in symptom management    Implement Change in Symptom Management:  Bladder (frequnt UTIs; start Hiprex and refer to Dr. Mendez , [facility])     After shared decision making, Kasandra has decided to proceed with Ocrevus.  The recommended vaccines from ID visit will be planned later today and we'll get updated labs.    F/u 3 mo Mayra Jacob [facility] virtually         Problem List Items Addressed This Visit          Unprioritized    Multiple sclerosis - Primary (Chronic)    Relevant Medications    methenamine (HIPREX) 1 gram Tab    Other Relevant Orders    Ambulatory referral/consult to Urology    CBC Auto Differential (Completed)    Hepatitis B Core Antibody, Total (Completed)    Hepatitis B Surface Antigen (Completed)    Immunoglobulins (IgG, IgA, IgM) Quantitative (Completed)    Neurogenic bladder    Relevant Medications    methenamine (HIPREX) 1 gram Tab    Other Relevant Orders    Ambulatory referral/consult to Urology    Counseling regarding goals of care    Immunosuppression due to drug therapy    Relevant Orders    CBC Auto Differential (Completed)    Hepatitis B Core Antibody, Total (Completed)    Hepatitis B Surface Antigen (Completed)    Immunoglobulins (IgG, IgA, IgM) Quantitative (Completed)       Fauzia Matthews MD    I spent a total of 40 minutes on the day of the visit.This includes face to face time and non-face to face time preparing to see the patient (eg, review of tests), obtaining and/or reviewing separately obtained history, documenting clinical information in the electronic or other health record, independently interpreting results and communicating results to the patient/family/caregiver, or care coordinator.

## 2023-04-24 NOTE — PROGRESS NOTES
Patient received zoster #1, Hep A #1 in the left deltoid and Prevnar 20 and Tdap vaccines in the right deltoid. Pt tolerated well. Pt asked to wait in the clinic 15 minutes after injection in the event of an allergic reaction. Pt verbalized understanding. Pt left in NAD.

## 2023-05-01 ENCOUNTER — PATIENT MESSAGE (OUTPATIENT)
Dept: NEUROLOGY | Facility: CLINIC | Age: 53
End: 2023-05-01
Payer: COMMERCIAL

## 2023-05-01 DIAGNOSIS — R76.8 ELEVATED ANTI-TISSUE TRANSGLUTAMINASE (TTG) IGA LEVEL: ICD-10-CM

## 2023-05-01 DIAGNOSIS — G35 MS (MULTIPLE SCLEROSIS): Primary | ICD-10-CM

## 2023-05-01 DIAGNOSIS — R89.9 ABNORMAL LABORATORY TEST: ICD-10-CM

## 2023-05-02 ENCOUNTER — TELEPHONE (OUTPATIENT)
Dept: NEUROLOGY | Facility: CLINIC | Age: 53
End: 2023-05-02
Payer: COMMERCIAL

## 2023-05-02 NOTE — TELEPHONE ENCOUNTER
----- Message from Fauzia Matthews MD sent at 4/24/2023  3:55 PM CDT -----  Pt now willing to proceed with Ocrevus; need to appeal the denial I suppose? Vaccines today and some updated labs too

## 2023-05-02 NOTE — TELEPHONE ENCOUNTER
Ocrevus new start    Therapy plan entered for re-work up    Ocrevus new start  4/24/23  WBC 6.74  ALC 1611  IgG 1058  IgM 69  IgA 477  HBsAg - anti-Hbc - , no current / past infection    7/5/22  TB Gold -     6/17/22  Anti-Hbs + - immune via vaccine  Hep C Ab -  Hep A Ab -  HIV -  Varicella Ab +  Strongyloides -    1/4/2022  AST 16, ALT 15    7/24/2013  JCV Ab +    Vaccines  Influenza:  Due 2023 -2024 Flu season  PCV 20: Immunized - received 4/24/2023  Tetanus (TdaP):  Immunized - received 4/24/2023 - due in 10 years  Hepatitis B:  Immune via labs from 6/17/22  Hepatitis A:  Received first dose on 4/24/23 - next appt 10/27/23  Shingrix: Received first dose on 4/24/23 - next appt 6/26/23  Covid:   Bivalent Boosters due. Will send recommendation via [facility] - Patient declines COVID vaccine per phone conversation on 5/29

## 2023-05-03 ENCOUNTER — DOCUMENTATION ONLY (OUTPATIENT)
Dept: NEUROLOGY | Facility: CLINIC | Age: 53
End: 2023-05-03
Payer: COMMERCIAL

## 2023-05-14 DIAGNOSIS — R53.83 FATIGUE, UNSPECIFIED TYPE: ICD-10-CM

## 2023-05-14 DIAGNOSIS — G35 MS (MULTIPLE SCLEROSIS): ICD-10-CM

## 2023-05-14 DIAGNOSIS — F32.A DEPRESSION, UNSPECIFIED DEPRESSION TYPE: ICD-10-CM

## 2023-05-15 RX ORDER — FLUOXETINE HYDROCHLORIDE 40 MG/1
CAPSULE ORAL
Qty: 90 CAPSULE | Refills: 3 | Status: SHIPPED | OUTPATIENT
Start: 2023-05-15

## 2023-05-29 NOTE — TELEPHONE ENCOUNTER
Ocrevus denied as plan prefers patient received Ocrevus through pharmacy benefit. Appeal written. Pending BB signature.     Spoke to patient and advised of the above. Will call with updates. Declines covid vaccines at this time.

## 2023-06-01 ENCOUNTER — PATIENT MESSAGE (OUTPATIENT)
Dept: NEUROLOGY | Facility: CLINIC | Age: 53
End: 2023-06-01
Payer: COMMERCIAL

## 2023-06-19 ENCOUNTER — OFFICE VISIT (OUTPATIENT)
Dept: OPHTHALMOLOGY | Facility: CLINIC | Age: 53
End: 2023-06-19
Payer: COMMERCIAL

## 2023-06-19 DIAGNOSIS — H53.9 VISION CHANGES: ICD-10-CM

## 2023-06-19 DIAGNOSIS — H52.223 REGULAR ASTIGMATISM WITH PRESBYOPIA, BILATERAL: ICD-10-CM

## 2023-06-19 DIAGNOSIS — G35 MS (MULTIPLE SCLEROSIS): Primary | ICD-10-CM

## 2023-06-19 DIAGNOSIS — H52.4 REGULAR ASTIGMATISM WITH PRESBYOPIA, BILATERAL: ICD-10-CM

## 2023-06-19 DIAGNOSIS — H25.13 NUCLEAR SCLEROSIS OF BOTH EYES: ICD-10-CM

## 2023-06-19 PROCEDURE — 92015 DETERMINE REFRACTIVE STATE: CPT | Mod: S$GLB,,, | Performed by: OPTOMETRIST

## 2023-06-19 PROCEDURE — 99999 PR PBB SHADOW E&M-EST. PATIENT-LVL III: ICD-10-PCS | Mod: PBBFAC,,, | Performed by: OPTOMETRIST

## 2023-06-19 PROCEDURE — 92015 PR REFRACTION: ICD-10-PCS | Mod: S$GLB,,, | Performed by: OPTOMETRIST

## 2023-06-19 PROCEDURE — 92004 COMPRE OPH EXAM NEW PT 1/>: CPT | Mod: S$GLB,,, | Performed by: OPTOMETRIST

## 2023-06-19 PROCEDURE — 99999 PR PBB SHADOW E&M-EST. PATIENT-LVL III: CPT | Mod: PBBFAC,,, | Performed by: OPTOMETRIST

## 2023-06-19 PROCEDURE — 92004 PR EYE EXAM, NEW PATIENT,COMPREHESV: ICD-10-PCS | Mod: S$GLB,,, | Performed by: OPTOMETRIST

## 2023-06-19 NOTE — PROGRESS NOTES
HPI     Hypertensive Eye Exam            Comments: Patient her she has had MS for the past 23 years and ws told   she should have an eye exam yearly. Patient states she has noticed her   near vision is worse and wears OTC readers +2.50.Patient distance is   staying blurry as well. Patient denies flashes and floaters.           Comments    1. Hypertension  2. MS           Last edited by Josey Greene on 6/19/2023  9:59 AM.            Assessment /Plan     For exam results, see Encounter Report.    MS (multiple sclerosis)  Baseline gOCT done today, normal  Optic nerve shows no signs of palor  Normal color vision   Continue per  Neurology   Reviewed s/s of optic neuritis.   RTC for baseline HVF 30-2 testing.      Nuclear sclerosis of both eyes  Cataracts are not visually significant and not affecting activities of daily living. Annual observation is recommended at this time. Patient to call or RTC with any significant change in vision prior to next visit.    Regular astigmatism with presbyopia, bilateral  Eyeglass Final Rx       Eyeglass Final Rx         Sphere Cylinder Axis Add    Right +0.25 +0.50 032 +2.00    Left [place] +0.50 109 +2.00      Type: PAL    Expiration Date: 6/19/2024                  RTC 3 weeks for HVF 30-2 and [facility] check with new PAL Rx sooner if any changes to vision or worsening symptoms.

## 2023-06-22 NOTE — TELEPHONE ENCOUNTER
Spoke to Lauri LUNA at [employer]. He states appeal was received on 6/1 and in process at of 6/3. He states to give [employer] 5 to 10 days to finish processing the appeal    Will f/u after on 7/5    Updated patient of the above

## 2023-07-07 NOTE — TELEPHONE ENCOUNTER
Appeal for Ocrevus denied through medical benefit. Must go through pharmacy benefit.    Will attempt to get Ocrevus approved through [facility].     Patient aware of the above

## 2023-07-12 ENCOUNTER — PATIENT MESSAGE (OUTPATIENT)
Dept: NEUROLOGY | Facility: CLINIC | Age: 53
End: 2023-07-12
Payer: COMMERCIAL

## 2023-07-12 NOTE — TELEPHONE ENCOUNTER
Orders for Ocrevus faxed to [facility] with cover sheet, recent labs, therapy plan for Ocrevus, patient demographics and insurance card    Patient aware of the above

## 2023-07-17 ENCOUNTER — TELEPHONE (OUTPATIENT)
Dept: NEUROLOGY | Facility: CLINIC | Age: 53
End: 2023-07-17
Payer: COMMERCIAL

## 2023-07-17 NOTE — TELEPHONE ENCOUNTER
----- Message from Cristal Logan RN sent at 7/17/2023 10:15 AM CDT -----  Regarding: FW: Advisement  Contact: @[phone]    ----- Message -----  From: Alonzo Canales  Sent: 7/17/2023   9:05 AM CDT  To: [facility] DONAHUE Staff  Subject: Advisement                                       [facility] is calling asking if this is the first time the patient would be taking Ocrevus. Please call and advise @[phone]

## 2023-07-21 ENCOUNTER — PATIENT MESSAGE (OUTPATIENT)
Dept: PSYCHIATRY | Facility: CLINIC | Age: 53
End: 2023-07-21
Payer: COMMERCIAL

## 2023-07-24 ENCOUNTER — OFFICE VISIT (OUTPATIENT)
Dept: NEUROLOGY | Facility: CLINIC | Age: 53
End: 2023-07-24
Payer: COMMERCIAL

## 2023-07-24 DIAGNOSIS — G35 MULTIPLE SCLEROSIS: Primary | ICD-10-CM

## 2023-07-24 DIAGNOSIS — M62.838 MUSCLE SPASM: ICD-10-CM

## 2023-07-24 DIAGNOSIS — Z79.899 HIGH RISK MEDICATION USE: ICD-10-CM

## 2023-07-24 DIAGNOSIS — M79.2 NEUROPATHIC PAIN: ICD-10-CM

## 2023-07-24 DIAGNOSIS — Z71.89 COUNSELING REGARDING GOALS OF CARE: ICD-10-CM

## 2023-07-24 DIAGNOSIS — Z29.89 PROPHYLACTIC IMMUNOTHERAPY: ICD-10-CM

## 2023-07-24 PROCEDURE — 99214 OFFICE O/P EST MOD 30 MIN: CPT | Mod: 95,,, | Performed by: CLINICAL NURSE SPECIALIST

## 2023-07-24 PROCEDURE — 99214 PR OFFICE/OUTPT VISIT, EST, LEVL IV, 30-39 MIN: ICD-10-PCS | Mod: 95,,, | Performed by: CLINICAL NURSE SPECIALIST

## 2023-07-24 RX ORDER — TIZANIDINE 4 MG/1
4 TABLET ORAL NIGHTLY
COMMUNITY
End: 2023-07-25 | Stop reason: SDUPTHER

## 2023-07-24 RX ORDER — SEMAGLUTIDE 1.34 MG/ML
INJECTION, SOLUTION SUBCUTANEOUS
COMMUNITY
End: 2024-04-01

## 2023-07-24 NOTE — PROGRESS NOTES
Subjective:          Patient ID: Kasandra Garcia is a 53 y.o. female who presents today for a routine virtual visit for MS.  She was last seen by Dr. Matthews in April 2023. The history has been provided by the patient.     The patient location is: her home   The chief complaint leading to consultation is: MS     Visit type: audiovisual    Face to Face time with patient: 22 minutes  38 minutes of total time spent on the encounter, which includes face to face time and non-face to face time preparing to see the patient (eg, review of tests), Obtaining and/or reviewing separately obtained history, Documenting clinical information in the electronic or other health record, Independently interpreting results (not separately reported) and communicating results to the patient/family/caregiver, or Care coordination (not separately reported).       Each patient to whom he or she provides medical services by telemedicine is:  (1) informed of the relationship between the physician and patient and the respective role of any other health care provider with respect to management of the patient; and (2) notified that he or she may decline to receive medical services by telemedicine and may withdraw from such care at any time.      MS HPI:  DMT: None- starting Ocrevus soon   Taking vitamin D3 as recommended? Alternating between 5000 and 10,000 units every other day   She will be going to [facility] in [place] for her initial infusions.   She requests that we prescribe Lyrica going forward. Her rheumatologist is doing it currently.   She has established care with a new urologist. She has not had any recent UTIs.   She denies any recent infections.   She is staying active, but is not exercising. She is going to start babysitting her 1 year-old grandson again in the fall.   She has been on Ozempic for 2.5 months and has lost about 20lbs.     Medications:  Current Outpatient Medications   Medication Sig    cholecalciferol, vitamin D3,  "(VITAMIN D3) 5,000 unit Tab Take 5,000 Units by mouth once daily. Alternate 5000 IU and 10,000 IU every other day.    cranberry extract 500 mg Cap Take by mouth.    estradiol 0.05 mg/24 hr td ptsw (VIVELLE-DOT) 0.05 mg/24 hr     FLUoxetine 40 MG capsule TAKE 1 CAPSULE ONCE DAILY    fluticasone propionate (FLONASE) 50 mcg/actuation nasal spray fluticasone propionate 50 mcg/actuation nasal spray,suspension    levomefolate-B12-herb 236 (RHEUMATE) 1-1-500 mg Cap Take 1 capsule by mouth once daily.    methenamine (HIPREX) 1 gram Tab Take 1 tablet (1 g total) by mouth 2 (two) times daily.    nitrofurantoin (MACRODANTIN) 50 MG capsule Take 50 mg by mouth.    nystatin (MYCOSTATIN) cream Apply topically 2 (two) times daily.    pregabalin (LYRICA) 100 MG capsule Take 100 mg by mouth 2 (two) times daily.    rizatriptan (MAXALT) 10 MG tablet TAKE 1 TABLET DAILY AS     NEEDED    tamsulosin (FLOMAX) 0.4 mg Cap Take 1 capsule by mouth.    traZODone (DESYREL) 50 MG tablet TAKE 1 TABLET EVERY EVENING     SOCIAL HISTORY  Social History     Tobacco Use    Smoking status: Never    Smokeless tobacco: Never   Substance Use Topics    Alcohol use: No     Alcohol/week: 0.0 standard drinks    Drug use: No       Living arrangements - the patient lives with her family     ROS:  REVIEW OF SYMPTOMS 7/24/23   Do you feel abnormally tired on most days? Yes   Do you feel you generally sleep well? Yes--she is sleeping well, but she is having trouble getting up in the morning.    Do you have difficulty controlling your bladder?  No--improved    Do you have difficulty controlling your bowels?  No   Do you have frequent muscle cramps, tightness or spasms in your limbs?  Yes--tizanidine helps    Do you have new visual symptoms?  No--wears glasses for reading    Do you have worsening difficulty with your memory or thinking? No--she reports that her memory is "terrible." She is managing high level functions at this time.    Do you have worsening symptoms " of anxiety or depression?  Yes--she feels depressed at times; she tries to push herself to do things. She feels like prozac helps with anxiety, but not so much depression. There is no rhyme or reason to her depression. She is not interested in counseling.    For patients who walk, Do you have more difficulty walking?  No   Have you fallen since your last visit?  No    For patients who use wheelchairs: Do you have any skin wounds or breakdown? Not Applicable   Do you have difficulty using your hands?  No   Do you have shooting or burning pain? Yes--has nerve pain in between her shoulder blades and down into her lower back; she also has MS hug. She gets relief from the Lyrica    Do you have difficulty with sexual function?  Yes--not assessed    If you are sexually active, are you using birth control? Y/N  N/A No   Do you often choke when swallowing liquids or solid food?  No   Do you experience worsening symptoms when overheated? Yes--has been avoiding the heat    Do you need any new equipment such as a wheelchair, walker or shower chair? No   Do you receive co-pay financial assistance for your principal MS medicine?    Would you be interested in participating in an MS research trial in the future? Yes   For patients on Gilenya, Tecfidera, Aubagio, Rituxan, Ocrevus, Tysabri, Lemtrada or Methotrexate, are you aware that you should NOT receive live virus vaccines?     Do you feel you have adequate family/friend support?  Yes   Do you have health insurance?   Yes   Are you currently employed? No   Do you receive SSDI/SSI?  No   Do you use marijuana or cannabis products? No   Have you been diagnosed with a urinary tract infection since your last visit here? Yes   Have you been diagnosed with a respiratory tract infection since your last visit here? No   Have you been to the emergency room since your last visit here? No   Have you been hospitalized since your last visit here?  No              Objective:        1. 25 foot  timed walk:  Timed 25 Foot Walk: 12/29/2022 4/24/2023   Did patient wear an AFO? No No   Was assistive device used? No No   Time for 25 Foot Walk (seconds) 3.5 3.7   Time for 25 Foot Walk (seconds) 3.6 -       Neurologic Exam    Deferred   Imaging:     Results for orders placed during the hospital encounter of 06/13/22    MRI Brain Demyelinating W W/O Contrast    Impression  Findings in the cerebral white matter which are in keeping with multiple sclerosis.  No new or enhancing lesions to suggest active disease.      Electronically signed by: Twin Cole  Date:    06/13/2022  Time:    18:03    Results for orders placed during the hospital encounter of 06/13/22    MRI Cervical Spine Demyelinating W W/O Contrast    Impression  Increased signal within the cervical spinal cord at C1-2 and T1.  The findings are not significantly changed when compared to the prior exam.    Please note evaluation is significantly limited by susceptibility artifacts from hardware within the cervical spine.      Electronically signed by: Twin Cole  Date:    06/13/2022  Time:    17:58          Labs:     Lab Results   Component Value Date    JFTVOQUG15DL 53 07/15/2021    DGGODUQM88FJ 60 08/14/2020    NWIWJGFL61BC 56 02/15/2019       Lab Results   Component Value Date    SR4QAGMZ 68.4 01/04/2022    ABSOLUTECD3 1210 01/04/2022    SD0BHGBS 14.0 01/04/2022    ABSOLUTECD8 248 01/04/2022    ZA4CZOLN 54.0 01/04/2022    ABSOLUTECD4 955 01/04/2022    LABCD48 3.85 (H) 01/04/2022     Lab Results   Component Value Date    WBC 6.74 04/24/2023    RBC 4.75 04/24/2023    HGB 14.2 04/24/2023    HCT 44.6 04/24/2023    MCV 94 04/24/2023    MCH 29.9 04/24/2023    MCHC 31.8 (L) 04/24/2023    RDW 12.3 04/24/2023     04/24/2023    MPV 9.6 04/24/2023    GRAN 4.6 04/24/2023    GRAN 67.9 04/24/2023    LYMPH 1.6 04/24/2023    LYMPH 23.9 04/24/2023    MONO 0.5 04/24/2023    MONO 6.8 04/24/2023    EOS 0.0 04/24/2023    BASO 0.04 04/24/2023    EOSINOPHIL 0.4  04/24/2023    BASOPHIL 0.6 04/24/2023     Sodium   Date Value Ref Range Status   07/15/2021 140 136 - 145 mmol/L Final     Potassium   Date Value Ref Range Status   07/15/2021 3.9 3.5 - 5.1 mmol/L Final     Chloride   Date Value Ref Range Status   07/15/2021 106 95 - 110 mmol/L Final     CO2   Date Value Ref Range Status   07/15/2021 24 23 - 29 mmol/L Final     Glucose   Date Value Ref Range Status   07/15/2021 128 (H) 70 - 110 mg/dL Final     BUN   Date Value Ref Range Status   07/15/2021 10 6 - 20 mg/dL Final     Creatinine   Date Value Ref Range Status   07/15/2021 0.7 0.5 - 1.4 mg/dL Final     Calcium   Date Value Ref Range Status   07/15/2021 8.6 (L) 8.7 - 10.5 mg/dL Final     Total Protein   Date Value Ref Range Status   01/04/2022 7.3 6.0 - 8.4 g/dL Final     Albumin   Date Value Ref Range Status   01/04/2022 4.3 3.5 - 5.2 g/dL Final     Total Bilirubin   Date Value Ref Range Status   01/04/2022 0.4 0.1 - 1.0 mg/dL Final     Comment:     For infants and newborns, interpretation of results should be based  on gestational age, weight and in agreement with clinical  observations.    Premature Infant recommended reference ranges:  Up to 24 hours.............<8.0 mg/dL  Up to 48 hours............<12.0 mg/dL  3-5 days..................<15.0 mg/dL  6-29 days.................<15.0 mg/dL       Alkaline Phosphatase   Date Value Ref Range Status   01/04/2022 98 55 - 135 U/L Final     AST   Date Value Ref Range Status   01/04/2022 16 10 - 40 U/L Final     ALT   Date Value Ref Range Status   01/04/2022 15 10 - 44 U/L Final     Anion Gap   Date Value Ref Range Status   07/15/2021 10 8 - 16 mmol/L Final     eGFR if    Date Value Ref Range Status   07/15/2021 >60 >60 mL/min/1.73 m^2 Final     eGFR if non    Date Value Ref Range Status   07/15/2021 >60 >60 mL/min/1.73 m^2 Final     Comment:     Calculation used to obtain the estimated glomerular filtration  rate (eGFR) is the CKD-EPI equation.         Lab Results   Component Value Date    HEPBSAG Non-reactive 04/24/2023    HEPBSAB Positive 06/17/2022    HEPBCAB Non-reactive 04/24/2023           MS Impression and Plan:     NEURO MULTIPLE SCLEROSIS IMPRESSION:   MS Status:     Clinical Progression:  Clinically Stable  Plan:     DMT:  No change in management    DMT comment:  She is set to start Ocrevus soon at [facility].     Symptom Management:  Implement change in symptom management    Implement Change in Symptom Management:  Pain, Spasticity and Mood (She has been taking tizanidine in the evening. Rx sent to pharmacy. Refill for Lyrica sent to pharmacy. May consider adding Abilify to fluoxetine for adjunct depression treatment.)    MRIs are planned in February. She will follow up with Dr. Matthews in February, but I will do a virtual visit with her in 3-4 months to check in post-Ocrevus.           SHALOM Donohue, CNS    Problem List Items Addressed This Visit       Muscle spasm    Relevant Medications    tiZANidine (ZANAFLEX) 4 MG tablet     Other Visit Diagnoses       Neuropathic pain    -  Primary    Relevant Medications    pregabalin (LYRICA) 100 MG capsule

## 2023-07-25 ENCOUNTER — PATIENT MESSAGE (OUTPATIENT)
Dept: NEUROLOGY | Facility: CLINIC | Age: 53
End: 2023-07-25
Payer: COMMERCIAL

## 2023-07-25 DIAGNOSIS — F32.A DEPRESSION, UNSPECIFIED DEPRESSION TYPE: Primary | ICD-10-CM

## 2023-07-25 RX ORDER — TIZANIDINE 4 MG/1
4 TABLET ORAL NIGHTLY
Qty: 90 TABLET | Refills: 1 | Status: SHIPPED | OUTPATIENT
Start: 2023-07-25 | End: 2024-01-25

## 2023-07-25 RX ORDER — PREGABALIN 100 MG/1
100 CAPSULE ORAL 2 TIMES DAILY
Qty: 180 CAPSULE | Refills: 0 | Status: SHIPPED | OUTPATIENT
Start: 2023-07-25 | End: 2023-11-06

## 2023-07-26 NOTE — TELEPHONE ENCOUNTER
Ocrevus infusion Dates:  300 mg - 7/27/23  300 mg - 8/10/23     Copay Assistance on file at [facility]. Member ID #[id number]

## 2023-07-28 RX ORDER — ARIPIPRAZOLE 2 MG/1
2 TABLET ORAL DAILY
Qty: 30 TABLET | Refills: 1 | Status: SHIPPED | OUTPATIENT
Start: 2023-07-28 | End: 2023-11-07

## 2023-09-14 ENCOUNTER — TELEPHONE (OUTPATIENT)
Dept: NEUROLOGY | Facility: CLINIC | Age: 53
End: 2023-09-14
Payer: COMMERCIAL

## 2023-09-14 NOTE — TELEPHONE ENCOUNTER
----- Message from Mayra Jacob, SHALOM, CNS sent at 7/25/2023  5:55 AM CDT -----  Virtual visit with me in 3-4 months

## 2023-10-20 ENCOUNTER — PATIENT MESSAGE (OUTPATIENT)
Dept: NEUROLOGY | Facility: CLINIC | Age: 53
End: 2023-10-20
Payer: COMMERCIAL

## 2023-11-04 DIAGNOSIS — M79.2 NEUROPATHIC PAIN: ICD-10-CM

## 2023-11-06 ENCOUNTER — PATIENT MESSAGE (OUTPATIENT)
Dept: NEUROLOGY | Facility: CLINIC | Age: 53
End: 2023-11-06
Payer: COMMERCIAL

## 2023-11-06 DIAGNOSIS — F32.A DEPRESSION, UNSPECIFIED DEPRESSION TYPE: Primary | ICD-10-CM

## 2023-11-06 RX ORDER — PREGABALIN 100 MG/1
100 CAPSULE ORAL 2 TIMES DAILY
Qty: 180 CAPSULE | Refills: 1 | Status: SHIPPED | OUTPATIENT
Start: 2023-11-06

## 2023-11-07 ENCOUNTER — PATIENT MESSAGE (OUTPATIENT)
Dept: NEUROLOGY | Facility: CLINIC | Age: 53
End: 2023-11-07

## 2023-11-07 ENCOUNTER — OFFICE VISIT (OUTPATIENT)
Dept: NEUROLOGY | Facility: CLINIC | Age: 53
End: 2023-11-07
Payer: COMMERCIAL

## 2023-11-07 DIAGNOSIS — Z71.89 COUNSELING REGARDING GOALS OF CARE: ICD-10-CM

## 2023-11-07 DIAGNOSIS — F32.A DEPRESSION, UNSPECIFIED DEPRESSION TYPE: ICD-10-CM

## 2023-11-07 DIAGNOSIS — Z79.899 HIGH RISK MEDICATION USE: ICD-10-CM

## 2023-11-07 DIAGNOSIS — G35 MULTIPLE SCLEROSIS: Primary | ICD-10-CM

## 2023-11-07 DIAGNOSIS — Z29.89 PROPHYLACTIC IMMUNOTHERAPY: ICD-10-CM

## 2023-11-07 PROCEDURE — 99213 PR OFFICE/OUTPT VISIT, EST, LEVL III, 20-29 MIN: ICD-10-PCS | Mod: 95,,, | Performed by: CLINICAL NURSE SPECIALIST

## 2023-11-07 PROCEDURE — 99213 OFFICE O/P EST LOW 20 MIN: CPT | Mod: 95,,, | Performed by: CLINICAL NURSE SPECIALIST

## 2023-11-07 RX ORDER — OXYBUTYNIN CHLORIDE 5 MG/1
5 TABLET, EXTENDED RELEASE ORAL
COMMUNITY

## 2023-11-07 NOTE — PROGRESS NOTES
Subjective:          Patient ID: Kasandra Garcia is a 53 y.o. female who presents today for a routine virtual visit for MS.  She was last seen in July 2023. The history has been provided by the patient.     The patient location is: her home   The chief complaint leading to consultation is: MS     Visit type: audiovisual    Face to Face time with patient: 17 minutes   25 minutes of total time spent on the encounter, which includes face to face time and non-face to face time preparing to see the patient (eg, review of tests), Obtaining and/or reviewing separately obtained history, Documenting clinical information in the electronic or other health record, Independently interpreting results (not separately reported) and communicating results to the patient/family/caregiver, or Care coordination (not separately reported).     Each patient to whom he or she provides medical services by telemedicine is:  (1) informed of the relationship between the physician and patient and the respective role of any other health care provider with respect to management of the patient; and (2) notified that he or she may decline to receive medical services by telemedicine and may withdraw from such care at any time.    MS HPI:  DMT: Ocrevus on 7/27 and 8/10; due in January   Side effects from DMT? No  Taking vitamin D3 as recommended? Alternates between 5000 and 10,000 units every other day   She has not started Abilify. She feels a sense of indifference/depression. She has fatigue and does not feel like pushing through it most of the time.   She denies any recent infections.   She denies new or different MS symptoms or signs of relapse.   She has lost 30lbs on Ozempic in the past 5 months or so. She is not exercising, but she is staying active watching her toddler grandson.   She has noticed that her hair is thinning; not significant.   She had some issues with diarrhea recently, so her urologist took her off of nitrofurantoin, and the  diarrhea resolved.   Oxybutynin and Flomax help with bladder function.   She denies any changes in mobility.   Tizanidine helps with muscle spasms.   She feels sensitive to cold.   She has a good support system.     Medications:  Current Outpatient Medications   Medication Sig    ARIPiprazole (ABILIFY) 2 MG Tab Take 1 tablet (2 mg total) by mouth once daily.    cholecalciferol, vitamin D3, (VITAMIN D3) 5,000 unit Tab Take 5,000 Units by mouth once daily. Alternate 5000 IU and 10,000 IU every other day.    cranberry extract 500 mg Cap Take by mouth.    estradiol 0.05 mg/24 hr td ptsw (VIVELLE-DOT) 0.05 mg/24 hr     FLUoxetine 40 MG capsule TAKE 1 CAPSULE ONCE DAILY    fluticasone propionate (FLONASE) 50 mcg/actuation nasal spray fluticasone propionate 50 mcg/actuation nasal spray,suspension    nitrofurantoin (MACRODANTIN) 50 MG capsule Take 50 mg by mouth once daily.    nystatin (MYCOSTATIN) cream Apply topically 2 (two) times daily. As needed    pregabalin (LYRICA) 100 MG capsule TAKE 1 CAPSULE TWICE DAILY    rizatriptan (MAXALT) 10 MG tablet TAKE 1 TABLET DAILY AS     NEEDED    semaglutide (OZEMPIC) 1 mg/dose (4 mg/3 mL) Inject into the skin every 7 days.    tamsulosin (FLOMAX) 0.4 mg Cap Take 1 capsule by mouth.    tiZANidine (ZANAFLEX) 4 MG tablet Take 1 tablet (4 mg total) by mouth every evening.    traZODone (DESYREL) 50 MG tablet TAKE 1 TABLET EVERY EVENING     SOCIAL HISTORY  Social History     Tobacco Use    Smoking status: Never    Smokeless tobacco: Never   Substance Use Topics    Alcohol use: No     Alcohol/week: 0.0 standard drinks of alcohol    Drug use: No       Living arrangements - the patient lives with her family         Objective:        1. 25 foot timed walk:      12/29/2022    12:01 AM 4/24/2023    12:01 AM   Timed 25 Foot Walk:   Did patient wear an AFO? No No   Was assistive device used? No No   Time for 25 Foot Walk (seconds) 3.5 3.7   Time for 25 Foot Walk (seconds) 3.6      Neurologic  Exam    Deferred   Imaging:       Results for orders placed during the hospital encounter of 06/13/22    MRI Brain Demyelinating W W/O Contrast    Impression  Findings in the cerebral white matter which are in keeping with multiple sclerosis.  No new or enhancing lesions to suggest active disease.      Electronically signed by: Twin Cole  Date:    06/13/2022  Time:    18:03    Results for orders placed during the hospital encounter of 06/13/22    MRI Cervical Spine Demyelinating W W/O Contrast    Impression  Increased signal within the cervical spinal cord at C1-2 and T1.  The findings are not significantly changed when compared to the prior exam.    Please note evaluation is significantly limited by susceptibility artifacts from hardware within the cervical spine.      Electronically signed by: Twin Cole  Date:    06/13/2022  Time:    17:58      Labs:     Lab Results   Component Value Date    XKWXGKHX80RX 53 07/15/2021    PLCYJCTE19RY 60 08/14/2020    MPGAKTTP91HQ 56 02/15/2019     Lab Results   Component Value Date    TI8JZERU 68.4 01/04/2022    ABSOLUTECD3 1210 01/04/2022    PS4BOSZJ 14.0 01/04/2022    ABSOLUTECD8 248 01/04/2022    NV0WIIKD 54.0 01/04/2022    ABSOLUTECD4 955 01/04/2022    LABCD48 3.85 (H) 01/04/2022     Lab Results   Component Value Date    WBC 6.74 04/24/2023    RBC 4.75 04/24/2023    HGB 14.2 04/24/2023    HCT 44.6 04/24/2023    MCV 94 04/24/2023    MCH 29.9 04/24/2023    MCHC 31.8 (L) 04/24/2023    RDW 12.3 04/24/2023     04/24/2023    MPV 9.6 04/24/2023    GRAN 4.6 04/24/2023    GRAN 67.9 04/24/2023    LYMPH 1.6 04/24/2023    LYMPH 23.9 04/24/2023    MONO 0.5 04/24/2023    MONO 6.8 04/24/2023    EOS 0.0 04/24/2023    BASO 0.04 04/24/2023    EOSINOPHIL 0.4 04/24/2023    BASOPHIL 0.6 04/24/2023     Sodium   Date Value Ref Range Status   07/15/2021 140 136 - 145 mmol/L Final     Potassium   Date Value Ref Range Status   07/15/2021 3.9 3.5 - 5.1 mmol/L Final     Chloride   Date Value  Ref Range Status   07/15/2021 106 95 - 110 mmol/L Final     CO2   Date Value Ref Range Status   07/15/2021 24 23 - 29 mmol/L Final     Glucose   Date Value Ref Range Status   07/15/2021 128 (H) 70 - 110 mg/dL Final     BUN   Date Value Ref Range Status   07/15/2021 10 6 - 20 mg/dL Final     Creatinine   Date Value Ref Range Status   07/15/2021 0.7 0.5 - 1.4 mg/dL Final     Calcium   Date Value Ref Range Status   07/15/2021 8.6 (L) 8.7 - 10.5 mg/dL Final     Total Protein   Date Value Ref Range Status   01/04/2022 7.3 6.0 - 8.4 g/dL Final     Albumin   Date Value Ref Range Status   01/04/2022 4.3 3.5 - 5.2 g/dL Final     Total Bilirubin   Date Value Ref Range Status   01/04/2022 0.4 0.1 - 1.0 mg/dL Final     Comment:     For infants and newborns, interpretation of results should be based  on gestational age, weight and in agreement with clinical  observations.    Premature Infant recommended reference ranges:  Up to 24 hours.............<8.0 mg/dL  Up to 48 hours............<12.0 mg/dL  3-5 days..................<15.0 mg/dL  6-29 days.................<15.0 mg/dL       Alkaline Phosphatase   Date Value Ref Range Status   01/04/2022 98 55 - 135 U/L Final     AST   Date Value Ref Range Status   01/04/2022 16 10 - 40 U/L Final     ALT   Date Value Ref Range Status   01/04/2022 15 10 - 44 U/L Final     Anion Gap   Date Value Ref Range Status   07/15/2021 10 8 - 16 mmol/L Final     eGFR if    Date Value Ref Range Status   07/15/2021 >60 >60 mL/min/1.73 m^2 Final     eGFR if non    Date Value Ref Range Status   07/15/2021 >60 >60 mL/min/1.73 m^2 Final     Comment:     Calculation used to obtain the estimated glomerular filtration  rate (eGFR) is the CKD-EPI equation.        Lab Results   Component Value Date    HEPBSAG Non-reactive 04/24/2023    HEPBSAB Positive 06/17/2022    HEPBCAB Non-reactive 04/24/2023       MS Impression and Plan:     NEURO MULTIPLE SCLEROSIS IMPRESSION:   MS Status:      Clinical Progression:  Clinically Stable  Plan:     DMT:  No change in management    DMT comment:  Continue Ocrevus and Vitamin D. Her next infusion is due in late January. We will check labs in December. She is aware of the risks associated with immunosuppressant therapy, including increased risk of infection.       Symptom Management:  Implement change in symptom management    Implement Change in Symptom Management:  Mood (She has decided to try Abilify.)       MRI brain planned for late December  She will follow up with Dr. Matthews in April 2024.         SHALOM Donohue, CNS    Problem List Items Addressed This Visit          Neurologic Problems    Multiple sclerosis - Primary (Chronic)    Overview     Stable on Tecfidera(RRMS)         Relevant Orders    CBC Auto Differential    Comprehensive Metabolic Panel    Hepatitis B Core Antibody, Total    Hepatitis B Surface Antigen    Immunoglobulins (IgG, IgA, IgM) Quantitative    Vitamin D    MRI Brain Demyelinating Without Contrast

## 2023-11-15 RX ORDER — ARIPIPRAZOLE 2 MG/1
2 TABLET ORAL DAILY
Qty: 30 TABLET | Refills: 5 | Status: SHIPPED | OUTPATIENT
Start: 2023-11-15 | End: 2023-11-16 | Stop reason: SDUPTHER

## 2023-11-16 RX ORDER — ARIPIPRAZOLE 2 MG/1
2 TABLET ORAL DAILY
Qty: 30 TABLET | Refills: 5 | Status: SHIPPED | OUTPATIENT
Start: 2023-11-16 | End: 2024-04-01

## 2023-11-21 ENCOUNTER — PATIENT MESSAGE (OUTPATIENT)
Dept: NEUROLOGY | Facility: CLINIC | Age: 53
End: 2023-11-21
Payer: COMMERCIAL

## 2023-11-21 ENCOUNTER — TELEPHONE (OUTPATIENT)
Dept: NEUROLOGY | Facility: CLINIC | Age: 53
End: 2023-11-21
Payer: COMMERCIAL

## 2023-11-21 NOTE — TELEPHONE ENCOUNTER
Spoke to pt and scheduled her labs on 12/21 at 10:30 AM at [facility]. I attempted to schedule pt for her MRI in December, but pt wanted to go to an MRI facility in . I informed pt that I cannot schedule MRIs at any of the  facilities, but I could provide her with the radiology number so she can call and schedule herself. Pt did not have a pen and paper readily available so I told pt I would send her a message through the portal with the  radiology number. Pt verbalized understanding. I also offered pt an April appt with BB on 4/1 at 9:40 AM and pt agreed. I told pt that BB's April schedule is not officially open right now. I explained she will get scheduled for this appt once 's schedule opens in December. Pt verbalized understanding.

## 2023-11-21 NOTE — TELEPHONE ENCOUNTER
----- Message from Myara Jacob, SHALOM, CNS sent at 11/7/2023  4:47 PM CST -----  Labs and MRI in December F/U with BB in April

## 2023-11-28 ENCOUNTER — PATIENT MESSAGE (OUTPATIENT)
Dept: NEUROLOGY | Facility: CLINIC | Age: 53
End: 2023-11-28
Payer: COMMERCIAL

## 2023-11-28 DIAGNOSIS — G35 MULTIPLE SCLEROSIS: Primary | ICD-10-CM

## 2023-12-01 RX ORDER — DIAZEPAM 5 MG/1
TABLET ORAL
Qty: 3 TABLET | Refills: 0 | Status: SHIPPED | OUTPATIENT
Start: 2023-12-01

## 2023-12-05 ENCOUNTER — TELEPHONE (OUTPATIENT)
Dept: NEUROLOGY | Facility: CLINIC | Age: 53
End: 2023-12-05
Payer: COMMERCIAL

## 2023-12-08 ENCOUNTER — HOSPITAL ENCOUNTER (OUTPATIENT)
Dept: RADIOLOGY | Facility: HOSPITAL | Age: 53
Discharge: HOME | End: 2023-12-08
Attending: CLINICAL NURSE SPECIALIST
Payer: COMMERCIAL

## 2023-12-08 DIAGNOSIS — G35 MULTIPLE SCLEROSIS: ICD-10-CM

## 2023-12-08 PROCEDURE — 70551 MRI BRAIN STEM W/O DYE: CPT | Mod: TC

## 2023-12-08 PROCEDURE — 70551 MRI BRAIN DEMYELINATING WITHOUT CONTRAST: ICD-10-PCS | Mod: 26,,, | Performed by: RADIOLOGY

## 2023-12-08 PROCEDURE — 70551 MRI BRAIN STEM W/O DYE: CPT | Mod: 26,,, | Performed by: RADIOLOGY

## 2023-12-13 ENCOUNTER — LAB VISIT (OUTPATIENT)
Dept: LAB | Facility: HOSPITAL | Age: 53
End: 2023-12-13
Attending: CLINICAL NURSE SPECIALIST
Payer: COMMERCIAL

## 2023-12-13 DIAGNOSIS — G35 MULTIPLE SCLEROSIS: ICD-10-CM

## 2023-12-13 LAB
25(OH)D3+25(OH)D2 SERPL-MCNC: 106 NG/ML (ref 30–96)
ALBUMIN SERPL BCP-MCNC: 3.9 G/DL (ref 3.5–5.2)
ALP SERPL-CCNC: 68 U/L (ref 55–135)
ALT SERPL W/O P-5'-P-CCNC: 12 U/L (ref 10–44)
ANION GAP SERPL CALC-SCNC: 10 MMOL/L (ref 8–16)
AST SERPL-CCNC: 16 U/L (ref 10–40)
BASOPHILS # BLD AUTO: 0.05 K/UL (ref 0–0.2)
BASOPHILS NFR BLD: 0.6 % (ref 0–1.9)
BILIRUB SERPL-MCNC: 0.4 MG/DL (ref 0.1–1)
BUN SERPL-MCNC: 15 MG/DL (ref 6–20)
CALCIUM SERPL-MCNC: 8.8 MG/DL (ref 8.7–10.5)
CHLORIDE SERPL-SCNC: 104 MMOL/L (ref 95–110)
CO2 SERPL-SCNC: 23 MMOL/L (ref 23–29)
CREAT SERPL-MCNC: 1 MG/DL (ref 0.5–1.4)
DIFFERENTIAL METHOD: NORMAL
EOSINOPHIL # BLD AUTO: 0.1 K/UL (ref 0–0.5)
EOSINOPHIL NFR BLD: 0.7 % (ref 0–8)
ERYTHROCYTE [DISTWIDTH] IN BLOOD BY AUTOMATED COUNT: 12.3 % (ref 11.5–14.5)
EST. GFR  (NO RACE VARIABLE): >60 ML/MIN/1.73 M^2
GLUCOSE SERPL-MCNC: 89 MG/DL (ref 70–110)
HBV CORE AB SERPL QL IA: NORMAL
HBV SURFACE AG SERPL QL IA: NORMAL
HCT VFR BLD AUTO: 42.4 % (ref 37–48.5)
HGB BLD-MCNC: 13.9 G/DL (ref 12–16)
IGA SERPL-MCNC: 456 MG/DL (ref 40–350)
IGG SERPL-MCNC: 948 MG/DL (ref 650–1600)
IGM SERPL-MCNC: 52 MG/DL (ref 50–300)
IMM GRANULOCYTES # BLD AUTO: 0.02 K/UL (ref 0–0.04)
IMM GRANULOCYTES NFR BLD AUTO: 0.2 % (ref 0–0.5)
LYMPHOCYTES # BLD AUTO: 1.5 K/UL (ref 1–4.8)
LYMPHOCYTES NFR BLD: 18.5 % (ref 18–48)
MCH RBC QN AUTO: 30.8 PG (ref 27–31)
MCHC RBC AUTO-ENTMCNC: 32.8 G/DL (ref 32–36)
MCV RBC AUTO: 94 FL (ref 82–98)
MONOCYTES # BLD AUTO: 0.6 K/UL (ref 0.3–1)
MONOCYTES NFR BLD: 7.8 % (ref 4–15)
NEUTROPHILS # BLD AUTO: 5.9 K/UL (ref 1.8–7.7)
NEUTROPHILS NFR BLD: 72.2 % (ref 38–73)
NRBC BLD-RTO: 0 /100 WBC
PLATELET # BLD AUTO: 393 K/UL (ref 150–450)
PMV BLD AUTO: 10.3 FL (ref 9.2–12.9)
POTASSIUM SERPL-SCNC: 3.9 MMOL/L (ref 3.5–5.1)
PROT SERPL-MCNC: 7.1 G/DL (ref 6–8.4)
RBC # BLD AUTO: 4.51 M/UL (ref 4–5.4)
SODIUM SERPL-SCNC: 137 MMOL/L (ref 136–145)
WBC # BLD AUTO: 8.17 K/UL (ref 3.9–12.7)

## 2023-12-13 PROCEDURE — 87340 HEPATITIS B SURFACE AG IA: CPT | Performed by: CLINICAL NURSE SPECIALIST

## 2023-12-13 PROCEDURE — 85025 COMPLETE CBC W/AUTO DIFF WBC: CPT | Performed by: CLINICAL NURSE SPECIALIST

## 2023-12-13 PROCEDURE — 82306 VITAMIN D 25 HYDROXY: CPT | Performed by: CLINICAL NURSE SPECIALIST

## 2023-12-13 PROCEDURE — 36415 COLL VENOUS BLD VENIPUNCTURE: CPT | Performed by: CLINICAL NURSE SPECIALIST

## 2023-12-13 PROCEDURE — 80053 COMPREHEN METABOLIC PANEL: CPT | Performed by: CLINICAL NURSE SPECIALIST

## 2023-12-13 PROCEDURE — 82784 ASSAY IGA/IGD/IGG/IGM EACH: CPT | Mod: 59 | Performed by: CLINICAL NURSE SPECIALIST

## 2023-12-13 PROCEDURE — 86704 HEP B CORE ANTIBODY TOTAL: CPT | Performed by: CLINICAL NURSE SPECIALIST

## 2023-12-17 ENCOUNTER — PATIENT MESSAGE (OUTPATIENT)
Dept: NEUROLOGY | Facility: CLINIC | Age: 53
End: 2023-12-17
Payer: COMMERCIAL

## 2023-12-26 NOTE — TELEPHONE ENCOUNTER
Labs   12/13/23  WBC 8.17  ALC 1511  AST 16, ALT 12  IgG 948 IgM 52 IgA 456  HBsAg - anti-Hbc  -, no current or past infection    Scheduled 1/19/24. Nothing needed from [facility]. Cleared by AP to infuse

## 2024-01-02 ENCOUNTER — PATIENT MESSAGE (OUTPATIENT)
Dept: NEUROLOGY | Facility: CLINIC | Age: 54
End: 2024-01-02
Payer: COMMERCIAL

## 2024-01-05 ENCOUNTER — LAB VISIT (OUTPATIENT)
Dept: LAB | Facility: HOSPITAL | Age: 54
End: 2024-01-05
Attending: PSYCHIATRY & NEUROLOGY
Payer: COMMERCIAL

## 2024-01-05 DIAGNOSIS — G35 MS (MULTIPLE SCLEROSIS): ICD-10-CM

## 2024-01-05 DIAGNOSIS — R89.9 ABNORMAL LABORATORY TEST: ICD-10-CM

## 2024-01-05 PROCEDURE — 36415 COLL VENOUS BLD VENIPUNCTURE: CPT | Performed by: PSYCHIATRY & NEUROLOGY

## 2024-01-05 PROCEDURE — 84165 PROTEIN E-PHORESIS SERUM: CPT | Mod: 26,,, | Performed by: PATHOLOGY

## 2024-01-05 PROCEDURE — 84165 PROTEIN E-PHORESIS SERUM: CPT | Performed by: PSYCHIATRY & NEUROLOGY

## 2024-01-08 LAB
ALBUMIN SERPL ELPH-MCNC: 3.99 G/DL (ref 3.35–5.55)
ALPHA1 GLOB SERPL ELPH-MCNC: 0.26 G/DL (ref 0.17–0.41)
ALPHA2 GLOB SERPL ELPH-MCNC: 0.66 G/DL (ref 0.43–0.99)
B-GLOBULIN SERPL ELPH-MCNC: 0.9 G/DL (ref 0.5–1.1)
GAMMA GLOB SERPL ELPH-MCNC: 0.88 G/DL (ref 0.67–1.58)
PROT SERPL-MCNC: 6.7 G/DL (ref 6–8.4)

## 2024-01-09 LAB — PATHOLOGIST INTERPRETATION SPE: NORMAL

## 2024-01-22 ENCOUNTER — PATIENT MESSAGE (OUTPATIENT)
Dept: PSYCHIATRY | Facility: CLINIC | Age: 54
End: 2024-01-22
Payer: COMMERCIAL

## 2024-01-23 DIAGNOSIS — M62.838 MUSCLE SPASM: ICD-10-CM

## 2024-01-25 RX ORDER — TIZANIDINE 4 MG/1
4 TABLET ORAL NIGHTLY
Qty: 90 TABLET | Refills: 1 | Status: SHIPPED | OUTPATIENT
Start: 2024-01-25

## 2024-03-07 ENCOUNTER — PATIENT MESSAGE (OUTPATIENT)
Dept: NEUROLOGY | Facility: CLINIC | Age: 54
End: 2024-03-07
Payer: COMMERCIAL

## 2024-03-07 DIAGNOSIS — L65.9 HAIR LOSS: Primary | ICD-10-CM

## 2024-03-31 DIAGNOSIS — G47.00 INSOMNIA, UNSPECIFIED TYPE: ICD-10-CM

## 2024-04-01 ENCOUNTER — OFFICE VISIT (OUTPATIENT)
Dept: NEUROLOGY | Facility: CLINIC | Age: 54
End: 2024-04-01
Payer: COMMERCIAL

## 2024-04-01 VITALS
DIASTOLIC BLOOD PRESSURE: 69 MMHG | SYSTOLIC BLOOD PRESSURE: 109 MMHG | BODY MASS INDEX: 27.88 KG/M2 | HEART RATE: 62 BPM | WEIGHT: 173.5 LBS | HEIGHT: 66 IN

## 2024-04-01 DIAGNOSIS — G35 MS (MULTIPLE SCLEROSIS): Primary | ICD-10-CM

## 2024-04-01 DIAGNOSIS — Z29.89 PROPHYLACTIC IMMUNOTHERAPY: ICD-10-CM

## 2024-04-01 DIAGNOSIS — F32.A DEPRESSION, UNSPECIFIED DEPRESSION TYPE: ICD-10-CM

## 2024-04-01 DIAGNOSIS — D84.9 IMMUNOCOMPROMISED: ICD-10-CM

## 2024-04-01 DIAGNOSIS — Z71.89 COUNSELING REGARDING GOALS OF CARE: ICD-10-CM

## 2024-04-01 PROCEDURE — 99215 OFFICE O/P EST HI 40 MIN: CPT | Mod: S$GLB,,, | Performed by: PSYCHIATRY & NEUROLOGY

## 2024-04-01 PROCEDURE — G2211 COMPLEX E/M VISIT ADD ON: HCPCS | Mod: S$GLB,,, | Performed by: PSYCHIATRY & NEUROLOGY

## 2024-04-01 PROCEDURE — 99999 PR PBB SHADOW E&M-EST. PATIENT-LVL IV: CPT | Mod: PBBFAC,,, | Performed by: PSYCHIATRY & NEUROLOGY

## 2024-04-01 RX ORDER — NITROFURANTOIN 25; 75 MG/1; MG/1
100 CAPSULE ORAL
COMMUNITY

## 2024-04-01 RX ORDER — ARIPIPRAZOLE 2 MG/1
2 TABLET ORAL DAILY
Qty: 90 TABLET | Refills: 3 | Status: SHIPPED | OUTPATIENT
Start: 2024-04-01 | End: 2025-04-01

## 2024-04-01 RX ORDER — TRAZODONE HYDROCHLORIDE 50 MG/1
TABLET ORAL
Qty: 90 TABLET | Refills: 1 | Status: SHIPPED | OUTPATIENT
Start: 2024-04-01

## 2024-04-01 RX ORDER — HYDROQUINONE 40 MG/G
CREAM TOPICAL
COMMUNITY
Start: 2023-12-08

## 2024-04-01 NOTE — PROGRESS NOTES
Subjective:          Patient ID: Kasandra Garcia is a 53 y.o. female who presents today for a routine clinic visit for MS.      MS HPI:  DMT: ocrelizumab - Jan / July  Side effects from DMT? Yes - hair thinning;   Taking vitamin D3 as recommended? Yes - 5,000 IU every other day b/c vit D was high in December   No sense of relapse or progressive decline;   Open to fast infusion;   Patient denies frequent, severe or unusual infections.  She started Abilify and liked it-- really helped her mood; but stopped it out of concern that it may be contributing to hair thinning; open to restarting it  Lyrica helps her pain; Tizanidine 2mg before bed;      Medications:  Current Outpatient Medications   Medication Sig    cholecalciferol, vitamin D3, (VITAMIN D3) 5,000 unit Tab Take 5,000 Units by mouth once daily. Alternate 5000 IU and 10,000 IU every other day.    COLLAGEN-BIOTIN-ASCORBIC ACID ORAL     diazePAM (VALIUM) 5 MG tablet Take 2 tablet by mouth 1 hour prior to MRI and 1 tablet at the time of the MRI if needed.    estradiol 0.05 mg/24 hr td ptsw (VIVELLE-DOT) 0.05 mg/24 hr     FLUoxetine 40 MG capsule TAKE 1 CAPSULE ONCE DAILY    hydroquinone 4 % Crea APPLY TO HANDS DAILY FOR DARK SPOTS    multivitamin with minerals (MULTI-VIT 55 PLUS ORAL)     nitrofurantoin, macrocrystal-monohydrate, (MACROBID) 100 MG capsule Take 100 mg by mouth.    oxybutynin (DITROPAN-XL) 5 MG TR24 Take 5 mg by mouth.    pregabalin (LYRICA) 100 MG capsule TAKE 1 CAPSULE TWICE DAILY    tiZANidine (ZANAFLEX) 4 MG tablet TAKE 1 TABLET EVERY EVENING    traZODone (DESYREL) 50 MG tablet TAKE 1 TABLET EVERY EVENING    rizatriptan (MAXALT) 10 MG tablet TAKE 1 TABLET DAILY AS     NEEDED (Patient not taking: Reported on 4/1/2024)     No current facility-administered medications for this visit.       SOCIAL HISTORY  Social History     Tobacco Use    Smoking status: Never    Smokeless tobacco: Never   Substance Use Topics    Alcohol use: No     Alcohol/week:  0.0 standard drinks of alcohol    Drug use: No       Living arrangements - the patient lives with their spouse.        3/30/2024     7:47 AM   REVIEW OF SYMPTOMS   Do you feel abnormally tired on most days? Yes - not new; pt states sleep study was neg   Do you feel you generally sleep well? Yes   Do you have difficulty controlling your bladder?  No   Do you have difficulty controlling your bowels?  No   Do you have frequent muscle cramps, tightness or spasms in your limbs?  Yes   Do you have new visual symptoms?  No   Do you have worsening difficulty with your memory or thinking? No   Do you have worsening symptoms of anxiety or depression?  No   For patients who walk, Do you have more difficulty walking?  No   Have you fallen since your last visit?  No   For patients who use wheelchairs: Do you have any skin wounds or breakdown? Not Applicable   Do you have difficulty using your hands?  No   Do you have shooting or burning pain? Yes   Do you have difficulty with sexual function?  No   If you are sexually active, are you using birth control? Y/N  N/A No   Do you often choke when swallowing liquids or solid food?  No   Do you experience worsening symptoms when overheated? No   Do you need any new equipment such as a wheelchair, walker or shower chair? No   Do you receive co-pay financial assistance for your principal MS medicine? Yes   Would you be interested in participating in an MS research trial in the future? No   For patients on Gilenya, Tecfidera, Aubagio, Rituxan, Ocrevus, Tysabri, Lemtrada or Methotrexate, are you aware that you should NOT receive live virus vaccines?  Yes   Do you feel you have adequate family/friend support?  Yes   Do you have health insurance?   Yes   Are you currently employed? No   Do you receive SSDI/SSI?  No   Do you use marijuana or cannabis products? No   Have you been diagnosed with a urinary tract infection since your last visit here? No   Have you been diagnosed with a  respiratory tract infection since your last visit here? No   Have you been to the emergency room since your last visit here? No   Have you been hospitalized since your last visit here?  No                Objective:            4/24/2023    12:01 AM 4/1/2024    12:01 AM   Timed 25 Foot Walk:   Did patient wear an AFO? No No   Was assistive device used? No No   Time for 25 Foot Walk (seconds) 3.7 3.4       Neurologic Exam    MENTAL STATUS: Grossly intact    CRANIAL NERVE EXAM: no SEMAJ; no dysarthria;     MOTOR EXAM: She has increased tone, but is mild in upper extremities. RSM nl; strength 5/5 all groups     REFLEXES: left KJ > right KJ    SENSORY EXAM: slight decrease vibration in distal LE    COORDINATION: She has dystaxia on the left finger-to-nose evaluation.     GAIT: narrow based and stable   Imaging:     Results for orders placed during the hospital encounter of 12/08/23    MRI Brain Demyelinating Without Contrast    Impression  Stable in noncontrast MRI of the brain.  Multiple white matter lesions consistent with demyelinating plaques of multiple sclerosis.  No new findings.      Electronically signed by: Aman Pastor MD  Date:    12/09/2023  Time:    09:24    No results found for this or any previous visit.    No results found for this or any previous visit.    Results for orders placed during the hospital encounter of 06/13/22    MRI Brain Demyelinating W W/O Contrast    Impression  Findings in the cerebral white matter which are in keeping with multiple sclerosis.  No new or enhancing lesions to suggest active disease.      Electronically signed by: Twin Cole  Date:    06/13/2022  Time:    18:03    Results for orders placed during the hospital encounter of 06/13/22    MRI Cervical Spine Demyelinating W W/O Contrast    Impression  Increased signal within the cervical spinal cord at C1-2 and T1.  The findings are not significantly changed when compared to the prior exam.    Please note evaluation is  "significantly limited by susceptibility artifacts from hardware within the cervical spine.      Electronically signed by: Twin Cole  Date:    06/13/2022  Time:    17:58    No results found for this or any previous visit.        Labs:     Lab Results   Component Value Date    IVQJZKOV68DY 106 (H) 12/13/2023    AQWPIKAA90CY 53 07/15/2021    CSACSVON25IY 60 08/14/2020     No results found for: "JCVINDEX", "JCVANTIBODY"  Lab Results   Component Value Date    CC2AQZRY 68.4 01/04/2022    ABSOLUTECD3 1210 01/04/2022    XM8GEPJE 14.0 01/04/2022    ABSOLUTECD8 248 01/04/2022    BD0YZGUS 54.0 01/04/2022    ABSOLUTECD4 955 01/04/2022    LABCD48 3.85 (H) 01/04/2022     Lab Results   Component Value Date    WBC 8.17 12/13/2023    RBC 4.51 12/13/2023    HGB 13.9 12/13/2023    HCT 42.4 12/13/2023    MCV 94 12/13/2023    MCH 30.8 12/13/2023    MCHC 32.8 12/13/2023    RDW 12.3 12/13/2023     12/13/2023    MPV 10.3 12/13/2023    GRAN 5.9 12/13/2023    GRAN 72.2 12/13/2023    LYMPH 1.5 12/13/2023    LYMPH 18.5 12/13/2023    MONO 0.6 12/13/2023    MONO 7.8 12/13/2023    EOS 0.1 12/13/2023    BASO 0.05 12/13/2023    EOSINOPHIL 0.7 12/13/2023    BASOPHIL 0.6 12/13/2023     Sodium   Date Value Ref Range Status   12/13/2023 137 136 - 145 mmol/L Final     Potassium   Date Value Ref Range Status   12/13/2023 3.9 3.5 - 5.1 mmol/L Final     Chloride   Date Value Ref Range Status   12/13/2023 104 95 - 110 mmol/L Final     CO2   Date Value Ref Range Status   12/13/2023 23 23 - 29 mmol/L Final     Glucose   Date Value Ref Range Status   12/13/2023 89 70 - 110 mg/dL Final     BUN   Date Value Ref Range Status   12/13/2023 15 6 - 20 mg/dL Final     Creatinine   Date Value Ref Range Status   12/13/2023 1.0 0.5 - 1.4 mg/dL Final     Calcium   Date Value Ref Range Status   12/13/2023 8.8 8.7 - 10.5 mg/dL Final     Total Protein   Date Value Ref Range Status   12/13/2023 7.1 6.0 - 8.4 g/dL Final     Albumin   Date Value Ref Range Status "   12/13/2023 3.9 3.5 - 5.2 g/dL Final     Total Bilirubin   Date Value Ref Range Status   12/13/2023 0.4 0.1 - 1.0 mg/dL Final     Comment:     For infants and newborns, interpretation of results should be based  on gestational age, weight and in agreement with clinical  observations.    Premature Infant recommended reference ranges:  Up to 24 hours.............<8.0 mg/dL  Up to 48 hours............<12.0 mg/dL  3-5 days..................<15.0 mg/dL  6-29 days.................<15.0 mg/dL       Alkaline Phosphatase   Date Value Ref Range Status   12/13/2023 68 55 - 135 U/L Final     AST   Date Value Ref Range Status   12/13/2023 16 10 - 40 U/L Final     ALT   Date Value Ref Range Status   12/13/2023 12 10 - 44 U/L Final     Anion Gap   Date Value Ref Range Status   12/13/2023 10 8 - 16 mmol/L Final     eGFR if    Date Value Ref Range Status   07/15/2021 >60 >60 mL/min/1.73 m^2 Final     eGFR if non    Date Value Ref Range Status   07/15/2021 >60 >60 mL/min/1.73 m^2 Final     Comment:     Calculation used to obtain the estimated glomerular filtration  rate (eGFR) is the CKD-EPI equation.        Lab Results   Component Value Date    HEPBSAG Non-reactive 12/13/2023    HEPBSAB Positive 06/17/2022    HEPBCAB Non-reactive 12/13/2023           MS Impression and Plan:     NEURO MULTIPLE SCLEROSIS IMPRESSION:   Number of relapses in the past year?:  0  Clinical Progression:  Clinically Stable  MRI Progression:  Stable  MS Classification:  Relapsing-Remitting MS  DMT:  No change in management  Symptom Management:  No change in symptom management (discussed repeat referral to sleep med; she defers for now;)   Doing well and tolerating Ocrevus  Labs in June  Ocrevus in July   F/u in 6 mo [facility]          Problem List Items Addressed This Visit          Unprioritized    MS (multiple sclerosis) - Primary    Relevant Orders    CBC Auto Differential    Comprehensive Metabolic Panel     Hepatitis B Core Antibody, Total    Hepatitis B Surface Antigen    Immunoglobulins (IgG, IgA, IgM) Quantitative    Counseling regarding goals of care     Other Visit Diagnoses       Depression, unspecified depression type        Relevant Medications    ARIPiprazole (ABILIFY) 2 MG Tab    Immunocompromised        Relevant Orders    CBC Auto Differential    Comprehensive Metabolic Panel    Hepatitis B Core Antibody, Total    Hepatitis B Surface Antigen    Immunoglobulins (IgG, IgA, IgM) Quantitative    Prophylactic immunotherapy                Fauzia Matthews MD    I spent a total of 40 minutes on the day of the visit.This includes face to face time and non-face to face time preparing to see the patient (eg, review of tests), obtaining and/or reviewing separately obtained history, documenting clinical information in the electronic or other health record, independently interpreting results and communicating results to the patient/family/caregiver, or care coordinator.  Visit today included increased complexity associated with the care of the episodic problem : sleep, fatigue, immunotherapy  addressed and managing the longitudinal care of the patient due to the serious and/or complex managed problem(s) MS.

## 2024-04-09 ENCOUNTER — PATIENT MESSAGE (OUTPATIENT)
Dept: NEUROLOGY | Facility: CLINIC | Age: 54
End: 2024-04-09
Payer: COMMERCIAL

## 2024-04-09 ENCOUNTER — TELEPHONE (OUTPATIENT)
Dept: NEUROLOGY | Facility: CLINIC | Age: 54
End: 2024-04-09
Payer: COMMERCIAL

## 2024-04-09 ENCOUNTER — OFFICE VISIT (OUTPATIENT)
Dept: DERMATOLOGY | Facility: CLINIC | Age: 54
End: 2024-04-09
Payer: COMMERCIAL

## 2024-04-09 DIAGNOSIS — L65.9 ALOPECIA: ICD-10-CM

## 2024-04-09 PROCEDURE — 99204 OFFICE O/P NEW MOD 45 MIN: CPT | Mod: 95,,, | Performed by: STUDENT IN AN ORGANIZED HEALTH CARE EDUCATION/TRAINING PROGRAM

## 2024-04-09 RX ORDER — KETOCONAZOLE 20 MG/ML
SHAMPOO, SUSPENSION TOPICAL WEEKLY
Qty: 120 ML | Refills: 5 | Status: SHIPPED | OUTPATIENT
Start: 2024-04-09

## 2024-04-09 NOTE — TELEPHONE ENCOUNTER
----- Message from Milagro Magana RN sent at 4/1/2024  3:53 PM CDT -----    ----- Message -----  From: Fauzia Matthews MD  Sent: 4/1/2024  10:16 AM CDT  To: Milagro Magana RN    On track for July Ocrevus -- outside infusion center;  labs in [place]

## 2024-04-09 NOTE — PROGRESS NOTES
Patient Information  Name: Kasandra Garcia  : 1970  MRN: [id number]     Referring Physician:  Dr. Jacob   Primary Care Physician:  Lauri Mahmood MD   Date of Visit: 2024      Subjective:       Kasandra aGrcia is a 53 y.o. female who presents for hair loss    HPI  The patient has a history of of MS, here to establish care. She is currently on Ocrevis for her MS. She reports since the medication, she has been experiencing shedding. She denies any itching of her scalp. She was recommended to take oral collagen for which she reports slight improvement but continues to experience hair loss.    Patient was last seen in Dermatology: Visit date not found.    Prior notes by myself reviewed.   Clinical documentation obtained by nursing staff reviewed.    Review of Systems   Skin:  Negative for itching and rash.        Objective:    Physical Exam   Constitutional: She appears well-developed and well-nourished. No distress.   Neurological: She is alert and oriented to person, place, and time. She is not disoriented.   Psychiatric: She has a normal mood and affect.   Skin:   Areas Examined (abnormalities noted in diagram):   Scalp / Hair Palpated and Inspected              Diagram Legend     Erythematous scaling macule/papule c/w actinic keratosis       Vascular papule c/w angioma      Pigmented verrucoid papule/plaque c/w seborrheic keratosis      Yellow umbilicated papule c/w sebaceous hyperplasia      Irregularly shaped tan macule c/w lentigo     1-2 mm smooth white papules consistent with Milia      Movable subcutaneous cyst with punctum c/w epidermal inclusion cyst      Subcutaneous movable cyst c/w pilar cyst      Firm pink to brown papule c/w dermatofibroma      Pedunculated fleshy papule(s) c/w skin tag(s)      Evenly pigmented macule c/w junctional nevus     Mildly variegated pigmented, slightly irregular-bordered macule c/w mildly atypical nevus      Flesh colored to evenly pigmented papule c/w  intradermal nevus       Pink pearly papule/plaque c/w basal cell carcinoma      Erythematous hyperkeratotic cursted plaque c/w SCC      Surgical scar with no sign of skin cancer recurrence      Open and closed comedones      Inflammatory papules and pustules      Verrucoid papule consistent consistent with wart     Erythematous eczematous patches and plaques     Dystrophic onycholytic nail with subungual debris c/w onychomycosis     Umbilicated papule    Erythematous-base heme-crusted tan verrucoid plaque consistent with inflamed seborrheic keratosis     Erythematous Silvery Scaling Plaque c/w Psoriasis     See annotation            [] Data reviewed    [] Prior external notes reviewed    [] Independent review of test    [] Management discussed with another provider    [] Independent historian    Assessment / Plan:        Alopecia  -     Suspect telogen effluvium 2/2 Ocrevis  - Recommend hair supplements, ketoconazole shampoo, topical rogaine 5% foam  -     ketoconazole (NIZORAL) 2 % shampoo; Apply topically once a week. Lather in for 5-10 min before rinsing  Dispense: 120 mL; Refill: 5  - Reviewed vitamin D, CBC--stable             The patient location is: [place]  The chief complaint leading to consultation is: hair loss    Visit type: audiovisual    Face to Face time with patient: 8 minutes  11 minutes of total time spent on the encounter, which includes face to face time and non-face to face time preparing to see the patient (eg, review of tests), Obtaining and/or reviewing separately obtained history, Documenting clinical information in the electronic or other health record, Independently interpreting results (not separately reported) and communicating results to the patient/family/caregiver, or Care coordination (not separately reported).         Each patient to whom he or she provides medical services by telemedicine is:  (1) informed of the relationship between the physician and patient and the respective role of  any other health care provider with respect to management of the patient; and (2) notified that he or she may decline to receive medical services by telemedicine and may withdraw from such care at any time.          LOS NUMBER AND COMPLEXITY OF PROBLEMS    COMPLEXITY OF DATA RISK TOTAL TIME (m)   47175  [id number] [] 1 self-limited or minor problem [] Minimal to none [] No treatment recommended or patient to monitor. Reassurance.  15-29  10-19   46264  07061 Low  [] 2 or more self limited or minor problems  [] 1 stable chronic illness  [] 1 acute, uncomplicated illness or injury Limited (2)  [] Prior external notes from each unique source  [x] Review result of each unique test  [] Order each unique test  OR [] Independent historian Low  []  OTC medications   []  Discussed/Decision for minor skin surgery (no risk factors) 30-44  20-29   49831  [id number] Moderate  [x]  1 or more chronic unstable illness (not at goal or progression or exacerbation) or SE of treatment  []  2 or more stable chronic illnesses  []  1 acute illness with systemic symptoms  []  1 acute complicated injury  []  1 undiagnosed new problem with uncertain prognosis Moderate (1/3 below)  []  3 or more data items        *Now includes independent historian  []  Independent interpretation of a test  []  Discuss management/test with another provider Moderate  [x]  Prescription drug mgmt  []  Discussed/Decision for Minor surgery with risk factors  []  Mgmt limited by social determinates 45-59  30-39   96598  [id number] High  []  1 or more chronic illness with severe exacerbation, progression or SE of treatment  []  1 acute or chronic illness/injury that poses a threat to life or bodily function Extensive (2/3 below)  []  3 or more data items        *Now includes independent historian.  []  Independent interpretation of a test  []  Discuss management/test with another provider High  []  Major surgery with risk discussed  []  Drug therapy requiring intensive monitoring  for toxicity  []  Hospitalization  []  Decision for DNR 60-74  40-54

## 2024-04-11 ENCOUNTER — OFFICE VISIT (OUTPATIENT)
Dept: NEUROLOGY | Facility: CLINIC | Age: 54
End: 2024-04-11
Payer: COMMERCIAL

## 2024-04-11 ENCOUNTER — PATIENT MESSAGE (OUTPATIENT)
Dept: NEUROLOGY | Facility: CLINIC | Age: 54
End: 2024-04-11

## 2024-04-11 DIAGNOSIS — Z79.899 HIGH RISK MEDICATION USE: ICD-10-CM

## 2024-04-11 DIAGNOSIS — Z71.89 COUNSELING REGARDING GOALS OF CARE: ICD-10-CM

## 2024-04-11 DIAGNOSIS — G35 MULTIPLE SCLEROSIS: Primary | ICD-10-CM

## 2024-04-11 DIAGNOSIS — L65.9 HAIR THINNING: ICD-10-CM

## 2024-04-11 DIAGNOSIS — Z29.89 PROPHYLACTIC IMMUNOTHERAPY: ICD-10-CM

## 2024-04-11 PROCEDURE — 99214 OFFICE O/P EST MOD 30 MIN: CPT | Mod: 95,,, | Performed by: CLINICAL NURSE SPECIALIST

## 2024-04-11 NOTE — PROGRESS NOTES
Subjective:          Patient ID: Kasandra Garcia is a 53 y.o. female who presents today for a fit-in virtual visit for MS.  She was last seen on 4/1/24 by Dr. Matthews. The history has been provided by the patient.     The patient location is: her home   The chief complaint leading to consultation is: MS     Visit type: audiovisual    Face to Face time with patient: 23 minutes   35 minutes of total time spent on the encounter, which includes face to face time and non-face to face time preparing to see the patient (eg, review of tests), Obtaining and/or reviewing separately obtained history, Documenting clinical information in the electronic or other health record, Independently interpreting results (not separately reported) and communicating results to the patient/family/caregiver, or Care coordination (not separately reported).       Each patient to whom he or she provides medical services by telemedicine is:  (1) informed of the relationship between the physician and patient and the respective role of any other health care provider with respect to management of the patient; and (2) notified that he or she may decline to receive medical services by telemedicine and may withdraw from such care at any time.      MS HPI:  DMT: Ocrevus--due in July   Side effects from DMT? No  Taking vitamin D3 as recommended? Yes  She has had hair thinning in the last several months that has gradually gotten worse. She noticed it starting to happen after starting Abilify, but then stopped Abilify and saw no regrowth. She restarted Abilify last week. She is concerned that Ocrevus may be contributing to hair loss.   She saw a dermatologist earlier this week with [facility] who recommended some hair growth supplements and prescription medications.     Medications:  Current Outpatient Medications   Medication Sig    ARIPiprazole (ABILIFY) 2 MG Tab Take 1 tablet (2 mg total) by mouth once daily.    cholecalciferol, vitamin D3, (VITAMIN D3)  5,000 unit Tab Take 5,000 Units by mouth once daily. Alternate 5000 IU and 10,000 IU every other day.    COLLAGEN-BIOTIN-ASCORBIC ACID ORAL     diazePAM (VALIUM) 5 MG tablet Take 2 tablet by mouth 1 hour prior to MRI and 1 tablet at the time of the MRI if needed.    estradiol 0.05 mg/24 hr td ptsw (VIVELLE-DOT) 0.05 mg/24 hr     FLUoxetine 40 MG capsule TAKE 1 CAPSULE ONCE DAILY    hydroquinone 4 % Crea APPLY TO HANDS DAILY FOR DARK SPOTS    ketoconazole (NIZORAL) 2 % shampoo Apply topically once a week. Lather in for 5-10 min before rinsing    multivitamin with minerals (MULTI-VIT 55 PLUS ORAL)     nitrofurantoin, macrocrystal-monohydrate, (MACROBID) 100 MG capsule Take 100 mg by mouth.    oxybutynin (DITROPAN-XL) 5 MG TR24 Take 5 mg by mouth.    pregabalin (LYRICA) 100 MG capsule TAKE 1 CAPSULE TWICE DAILY    tiZANidine (ZANAFLEX) 4 MG tablet TAKE 1 TABLET EVERY EVENING    traZODone (DESYREL) 50 MG tablet TAKE 1 TABLET EVERY EVENING       SOCIAL HISTORY  Social History     Tobacco Use    Smoking status: Never    Smokeless tobacco: Never   Substance Use Topics    Alcohol use: No     Alcohol/week: 0.0 standard drinks of alcohol    Drug use: No       Living arrangements - the patient lives with her               Objective:        1. 25 foot timed walk:      4/24/2023    12:01 AM 4/1/2024    12:01 AM   Timed 25 Foot Walk:   Did patient wear an AFO? No No   Was assistive device used? No No   Time for 25 Foot Walk (seconds) 3.7 3.4       Neurologic Exam    Deferred   Imaging:     Results for orders placed during the hospital encounter of 12/08/23    MRI Brain Demyelinating Without Contrast    Impression  Stable in noncontrast MRI of the brain.  Multiple white matter lesions consistent with demyelinating plaques of multiple sclerosis.  No new findings.      Electronically signed by: Aman Pastor MD  Date:    12/09/2023  Time:    09:24      Labs:     Lab Results   Component Value Date    YBDIEMXF27CK 106 (H)  12/13/2023    JOZTBAFF98FZ 53 07/15/2021    BYUOZBED53NQ 60 08/14/2020     Lab Results   Component Value Date    MO5ICKFR 68.4 01/04/2022    ABSOLUTECD3 1210 01/04/2022    TJ5MYWDI 14.0 01/04/2022    ABSOLUTECD8 248 01/04/2022    HJ3AXNHB 54.0 01/04/2022    ABSOLUTECD4 955 01/04/2022    LABCD48 3.85 (H) 01/04/2022     Lab Results   Component Value Date    WBC 8.17 12/13/2023    RBC 4.51 12/13/2023    HGB 13.9 12/13/2023    HCT 42.4 12/13/2023    MCV 94 12/13/2023    MCH 30.8 12/13/2023    MCHC 32.8 12/13/2023    RDW 12.3 12/13/2023     12/13/2023    MPV 10.3 12/13/2023    GRAN 5.9 12/13/2023    GRAN 72.2 12/13/2023    LYMPH 1.5 12/13/2023    LYMPH 18.5 12/13/2023    MONO 0.6 12/13/2023    MONO 7.8 12/13/2023    EOS 0.1 12/13/2023    BASO 0.05 12/13/2023    EOSINOPHIL 0.7 12/13/2023    BASOPHIL 0.6 12/13/2023     Sodium   Date Value Ref Range Status   12/13/2023 137 136 - 145 mmol/L Final     Potassium   Date Value Ref Range Status   12/13/2023 3.9 3.5 - 5.1 mmol/L Final     Chloride   Date Value Ref Range Status   12/13/2023 104 95 - 110 mmol/L Final     CO2   Date Value Ref Range Status   12/13/2023 23 23 - 29 mmol/L Final     Glucose   Date Value Ref Range Status   12/13/2023 89 70 - 110 mg/dL Final     BUN   Date Value Ref Range Status   12/13/2023 15 6 - 20 mg/dL Final     Creatinine   Date Value Ref Range Status   12/13/2023 1.0 0.5 - 1.4 mg/dL Final     Calcium   Date Value Ref Range Status   12/13/2023 8.8 8.7 - 10.5 mg/dL Final     Total Protein   Date Value Ref Range Status   12/13/2023 7.1 6.0 - 8.4 g/dL Final     Albumin   Date Value Ref Range Status   12/13/2023 3.9 3.5 - 5.2 g/dL Final     Total Bilirubin   Date Value Ref Range Status   12/13/2023 0.4 0.1 - 1.0 mg/dL Final     Comment:     For infants and newborns, interpretation of results should be based  on gestational age, weight and in agreement with clinical  observations.    Premature Infant recommended reference ranges:  Up to 24  hours.............<8.0 mg/dL  Up to 48 hours............<12.0 mg/dL  3-5 days..................<15.0 mg/dL  6-29 days.................<15.0 mg/dL       Alkaline Phosphatase   Date Value Ref Range Status   12/13/2023 68 55 - 135 U/L Final     AST   Date Value Ref Range Status   12/13/2023 16 10 - 40 U/L Final     ALT   Date Value Ref Range Status   12/13/2023 12 10 - 44 U/L Final     Anion Gap   Date Value Ref Range Status   12/13/2023 10 8 - 16 mmol/L Final     eGFR if    Date Value Ref Range Status   07/15/2021 >60 >60 mL/min/1.73 m^2 Final     eGFR if non    Date Value Ref Range Status   07/15/2021 >60 >60 mL/min/1.73 m^2 Final     Comment:     Calculation used to obtain the estimated glomerular filtration  rate (eGFR) is the CKD-EPI equation.        Lab Results   Component Value Date    HEPBSAG Non-reactive 12/13/2023    HEPBSAB Positive 06/17/2022    HEPBCAB Non-reactive 12/13/2023           MS Impression and Plan:     NEURO MULTIPLE SCLEROSIS IMPRESSION:   DMT comment:  Next Ocrevus is due in July. At this time, with her concern that Ocrevus may be contributing to significant hair thinning, we have discussed other options. Zeposia and Mavenclad were discussed today, detailing their mechanisms of actions, potential risks, potential side effects, and required pre-screenings. I will send her literature on both of these medications. She would like to take the supplements and medications prescribed by dermatology and evaluate if these are helpful. We will plan to see her back in June to decide about proceeding with Ocrevus vs switching DMT to one of these other options.         SHALOM Donohue, CNS

## 2024-04-17 ENCOUNTER — TELEPHONE (OUTPATIENT)
Dept: NEUROLOGY | Facility: CLINIC | Age: 54
End: 2024-04-17
Payer: COMMERCIAL

## 2024-04-17 NOTE — TELEPHONE ENCOUNTER
----- Message from SHALOM Gaitan, CNS sent at 4/11/2024  2:00 PM CDT -----  F/U with me in 2 months, virtual is ok

## 2024-05-02 ENCOUNTER — PATIENT MESSAGE (OUTPATIENT)
Dept: PSYCHIATRY | Facility: CLINIC | Age: 54
End: 2024-05-02
Payer: COMMERCIAL

## 2024-05-08 DIAGNOSIS — F32.A DEPRESSION, UNSPECIFIED DEPRESSION TYPE: ICD-10-CM

## 2024-05-08 DIAGNOSIS — R53.83 FATIGUE, UNSPECIFIED TYPE: ICD-10-CM

## 2024-05-08 DIAGNOSIS — G35 MS (MULTIPLE SCLEROSIS): ICD-10-CM

## 2024-05-08 RX ORDER — FLUOXETINE HYDROCHLORIDE 40 MG/1
CAPSULE ORAL
Qty: 90 CAPSULE | Refills: 3 | Status: SHIPPED | OUTPATIENT
Start: 2024-05-08

## 2024-05-27 DIAGNOSIS — M79.2 NEUROPATHIC PAIN: ICD-10-CM

## 2024-05-27 RX ORDER — PREGABALIN 100 MG/1
100 CAPSULE ORAL 2 TIMES DAILY
Qty: 180 CAPSULE | Refills: 1 | Status: SHIPPED | OUTPATIENT
Start: 2024-05-27

## 2024-05-28 ENCOUNTER — PATIENT MESSAGE (OUTPATIENT)
Dept: NEUROLOGY | Facility: CLINIC | Age: 54
End: 2024-05-28
Payer: COMMERCIAL

## 2024-05-28 ENCOUNTER — LAB VISIT (OUTPATIENT)
Dept: LAB | Facility: HOSPITAL | Age: 54
End: 2024-05-28
Attending: PSYCHIATRY & NEUROLOGY
Payer: COMMERCIAL

## 2024-05-28 DIAGNOSIS — D84.9 IMMUNOCOMPROMISED: ICD-10-CM

## 2024-05-28 DIAGNOSIS — G35 MS (MULTIPLE SCLEROSIS): ICD-10-CM

## 2024-05-28 LAB
ALBUMIN SERPL BCP-MCNC: 4 G/DL (ref 3.5–5.2)
ALP SERPL-CCNC: 78 U/L (ref 55–135)
ALT SERPL W/O P-5'-P-CCNC: 12 U/L (ref 10–44)
ANION GAP SERPL CALC-SCNC: 7 MMOL/L (ref 8–16)
AST SERPL-CCNC: 18 U/L (ref 10–40)
BASOPHILS # BLD AUTO: 0.05 K/UL (ref 0–0.2)
BASOPHILS NFR BLD: 0.9 % (ref 0–1.9)
BILIRUB SERPL-MCNC: 0.4 MG/DL (ref 0.1–1)
BUN SERPL-MCNC: 11 MG/DL (ref 6–20)
CALCIUM SERPL-MCNC: 9.8 MG/DL (ref 8.7–10.5)
CHLORIDE SERPL-SCNC: 103 MMOL/L (ref 95–110)
CO2 SERPL-SCNC: 27 MMOL/L (ref 23–29)
CREAT SERPL-MCNC: 0.9 MG/DL (ref 0.5–1.4)
DIFFERENTIAL METHOD BLD: ABNORMAL
EOSINOPHIL # BLD AUTO: 0.2 K/UL (ref 0–0.5)
EOSINOPHIL NFR BLD: 3.1 % (ref 0–8)
ERYTHROCYTE [DISTWIDTH] IN BLOOD BY AUTOMATED COUNT: 12 % (ref 11.5–14.5)
EST. GFR  (NO RACE VARIABLE): >60 ML/MIN/1.73 M^2
GLUCOSE SERPL-MCNC: 90 MG/DL (ref 70–110)
HBV CORE AB SERPL QL IA: NORMAL
HBV SURFACE AG SERPL QL IA: NORMAL
HCT VFR BLD AUTO: 45.7 % (ref 37–48.5)
HGB BLD-MCNC: 14.6 G/DL (ref 12–16)
IGA SERPL-MCNC: 513 MG/DL (ref 40–350)
IGG SERPL-MCNC: 1017 MG/DL (ref 650–1600)
IGM SERPL-MCNC: 56 MG/DL (ref 50–300)
IMM GRANULOCYTES # BLD AUTO: 0.01 K/UL (ref 0–0.04)
IMM GRANULOCYTES NFR BLD AUTO: 0.2 % (ref 0–0.5)
LYMPHOCYTES # BLD AUTO: 1.6 K/UL (ref 1–4.8)
LYMPHOCYTES NFR BLD: 27.5 % (ref 18–48)
MCH RBC QN AUTO: 29.8 PG (ref 27–31)
MCHC RBC AUTO-ENTMCNC: 31.9 G/DL (ref 32–36)
MCV RBC AUTO: 93 FL (ref 82–98)
MONOCYTES # BLD AUTO: 0.4 K/UL (ref 0.3–1)
MONOCYTES NFR BLD: 7.1 % (ref 4–15)
NEUTROPHILS # BLD AUTO: 3.6 K/UL (ref 1.8–7.7)
NEUTROPHILS NFR BLD: 61.2 % (ref 38–73)
NRBC BLD-RTO: 0 /100 WBC
PLATELET # BLD AUTO: 399 K/UL (ref 150–450)
PMV BLD AUTO: 10 FL (ref 9.2–12.9)
POTASSIUM SERPL-SCNC: 4.6 MMOL/L (ref 3.5–5.1)
PROT SERPL-MCNC: 7.5 G/DL (ref 6–8.4)
RBC # BLD AUTO: 4.9 M/UL (ref 4–5.4)
SODIUM SERPL-SCNC: 137 MMOL/L (ref 136–145)
WBC # BLD AUTO: 5.81 K/UL (ref 3.9–12.7)

## 2024-05-28 PROCEDURE — 80053 COMPREHEN METABOLIC PANEL: CPT | Performed by: PSYCHIATRY & NEUROLOGY

## 2024-05-28 PROCEDURE — 82784 ASSAY IGA/IGD/IGG/IGM EACH: CPT | Mod: 59 | Performed by: PSYCHIATRY & NEUROLOGY

## 2024-05-28 PROCEDURE — 85025 COMPLETE CBC W/AUTO DIFF WBC: CPT | Performed by: PSYCHIATRY & NEUROLOGY

## 2024-05-28 PROCEDURE — 87340 HEPATITIS B SURFACE AG IA: CPT | Performed by: PSYCHIATRY & NEUROLOGY

## 2024-05-28 PROCEDURE — 86704 HEP B CORE ANTIBODY TOTAL: CPT | Performed by: PSYCHIATRY & NEUROLOGY

## 2024-05-28 PROCEDURE — 36415 COLL VENOUS BLD VENIPUNCTURE: CPT | Performed by: PSYCHIATRY & NEUROLOGY

## 2024-05-28 RX ORDER — PREGABALIN 100 MG/1
100 CAPSULE ORAL 2 TIMES DAILY
Qty: 180 CAPSULE | Refills: 1 | OUTPATIENT
Start: 2024-05-28

## 2024-06-07 NOTE — PROGRESS NOTES
"Subjective:          Patient ID: Kasandra Garcia is a 54 y.o. female who presents today for a fit-in virtual visit for MS.  She was last seen in April 2024. The history has been provided by the patient.     The patient location is: her home   The chief complaint leading to consultation is: MS     Visit type: audiovisual    Face to Face time with patient: 20 minutes   30 minutes of total time spent on the encounter, which includes face to face time and non-face to face time preparing to see the patient (eg, review of tests), Obtaining and/or reviewing separately obtained history, Documenting clinical information in the electronic or other health record, Independently interpreting results (not separately reported) and communicating results to the patient/family/caregiver, or Care coordination (not separately reported).       Each patient to whom he or she provides medical services by telemedicine is:  (1) informed of the relationship between the physician and patient and the respective role of any other health care provider with respect to management of the patient; and (2) notified that he or she may decline to receive medical services by telemedicine and may withdraw from such care at any time.      MS HPI:  DMT: Ocrevus--scheduled July 26  Side effects from DMT? No  Taking vitamin D3 as recommended? Yes   She is having cosmetic surgery on July 3rd. She is having liposuction and tummy tuck and removal of scar tissue. She will have limited activity for a couple of weeks. She denies any past issues with general anesthesia.   Her hair has stopped falling out to the extent that it was. She is starting to see some hair regrowth as well. She is taking Nutrafol and using Nizoral shampoo.   She denies any recent infections.   She denies any new MS symptoms.  Her mood has been much better. She is tolerating Abilify well.  It has been "life changing."   She is exercising "a little."   She avoids the heat for long periods of " time.   She has started Linzess for constipation, but is not sure if it is helping.     Medications:  Current Outpatient Medications   Medication Sig    ARIPiprazole (ABILIFY) 2 MG Tab Take 1 tablet (2 mg total) by mouth once daily.    cholecalciferol, vitamin D3, (VITAMIN D3) 5,000 unit Tab Take 5,000 Units by mouth once daily.    estradiol 0.05 mg/24 hr td ptsw (VIVELLE-DOT) 0.05 mg/24 hr     FLUoxetine 40 MG capsule TAKE 1 CAPSULE ONCE DAILY    ketoconazole (NIZORAL) 2 % shampoo Apply topically once a week. Lather in for 5-10 min before rinsing    nitrofurantoin, macrocrystal-monohydrate, (MACROBID) 100 MG capsule Take 100 mg by mouth. Daily for UTI prevention    oxybutynin (DITROPAN-XL) 5 MG TR24 Take 5 mg by mouth.    pregabalin (LYRICA) 100 MG capsule TAKE 1 CAPSULE TWICE DAILY    tiZANidine (ZANAFLEX) 4 MG tablet TAKE 1 TABLET EVERY EVENING    traZODone (DESYREL) 50 MG tablet TAKE 1 TABLET EVERY EVENING    COLLAGEN-BIOTIN-ASCORBIC ACID ORAL  (Patient not taking: Reported on 6/10/2024)    hydroquinone 4 % Crea APPLY TO HANDS DAILY FOR DARK SPOTS (Patient not taking: Reported on 6/10/2024)    linaCLOtide (LINZESS) 72 mcg Cap capsule Take 1 capsule by mouth every morning. (Patient not taking: Reported on 6/10/2024)    multivitamin with minerals (MULTI-VIT 55 PLUS ORAL)  (Patient not taking: Reported on 6/10/2024)     No current facility-administered medications for this visit.       SOCIAL HISTORY  Social History     Tobacco Use    Smoking status: Never    Smokeless tobacco: Never   Substance Use Topics    Alcohol use: No     Alcohol/week: 0.0 standard drinks of alcohol    Drug use: No       Living arrangements - the patient lives with her family     ROS:      6/10/24    REVIEW OF SYMPTOMS   Do you feel abnormally tired on most days? Yes--improved since starting Abilify    Do you feel you generally sleep well? Yes--wakes up once at night, but can get back to sleep easily    Do you have difficulty controlling  your bladder?  No--takes oxybutynin, which helps. Also takes macrobid for UTI prevention.    Do you have difficulty controlling your bowels?  No--trying Linzess    Do you have frequent muscle cramps, tightness or spasms in your limbs?  Yes--manageable; tries to stretch regularly and takes tizanidine    Do you have new visual symptoms?  No--age-related; sees eye doctor annually    Do you have worsening difficulty with your memory or thinking? No--manages medications and finances    Do you have worsening symptoms of anxiety or depression?  No--improved; takes fluoxetine and Abilify    For patients who walk, Do you have more difficulty walking?  No   Have you fallen since your last visit?  No   For patients who use wheelchairs: Do you have any skin wounds or breakdown? Not Applicable   Do you have difficulty using your hands?  No   Do you have shooting or burning pain? Yes--some pain in between her shoulder blades; better with stretching    Do you have difficulty with sexual function?  No   If you are sexually active, are you using birth control? Y/N  N/A No   Do you often choke when swallowing liquids or solid food?  No   Do you experience worsening symptoms when overheated? No--discussed    Do you need any new equipment such as a wheelchair, walker or shower chair? No   Do you receive co-pay financial assistance for your principal MS medicine? Yes   Would you be interested in participating in an MS research trial in the future? No   For patients on Gilenya, Tecfidera, Aubagio, Rituxan, Ocrevus, Tysabri, Lemtrada or Methotrexate, are you aware that you should NOT receive live virus vaccines?  Yes   Do you feel you have adequate family/friend support?  Yes   Do you have health insurance?   Yes   Are you currently employed? No   Do you receive SSDI/SSI?  No   Do you use marijuana or cannabis products? No   Have you been diagnosed with a urinary tract infection since your last visit here? No   Have you been diagnosed  with a respiratory tract infection since your last visit here? No   Have you been to the emergency room since your last visit here? No   Have you been hospitalized since your last visit here?  No            Objective:        1. 25 foot timed walk:      4/24/2023    12:01 AM 4/1/2024    12:01 AM   Timed 25 Foot Walk:   Did patient wear an AFO? No No   Was assistive device used? No No   Time for 25 Foot Walk (seconds) 3.7 3.4       Neurologic Exam    Deferred   Imaging:     Results for orders placed during the hospital encounter of 12/08/23    MRI Brain Demyelinating Without Contrast    Impression  Stable in noncontrast MRI of the brain.  Multiple white matter lesions consistent with demyelinating plaques of multiple sclerosis.  No new findings.      Electronically signed by: Aman Pastor MD  Date:    12/09/2023  Time:    09:24          Labs:     Lab Results   Component Value Date    BVWVDENO98UT 106 (H) 12/13/2023    QWPXIPZU06CA 53 07/15/2021    ENLJEOOS06WR 60 08/14/2020     Lab Results   Component Value Date    LP5NIIMD 68.4 01/04/2022    ABSOLUTECD3 1210 01/04/2022    VG3MYCUK 14.0 01/04/2022    ABSOLUTECD8 248 01/04/2022    CW4QNOEN 54.0 01/04/2022    ABSOLUTECD4 955 01/04/2022    LABCD48 3.85 (H) 01/04/2022     Lab Results   Component Value Date    WBC 5.81 05/28/2024    RBC 4.90 05/28/2024    HGB 14.6 05/28/2024    HCT 45.7 05/28/2024    MCV 93 05/28/2024    MCH 29.8 05/28/2024    MCHC 31.9 (L) 05/28/2024    RDW 12.0 05/28/2024     05/28/2024    MPV 10.0 05/28/2024    GRAN 3.6 05/28/2024    GRAN 61.2 05/28/2024    LYMPH 1.6 05/28/2024    LYMPH 27.5 05/28/2024    MONO 0.4 05/28/2024    MONO 7.1 05/28/2024    EOS 0.2 05/28/2024    BASO 0.05 05/28/2024    EOSINOPHIL 3.1 05/28/2024    BASOPHIL 0.9 05/28/2024     Sodium   Date Value Ref Range Status   05/28/2024 137 136 - 145 mmol/L Final     Potassium   Date Value Ref Range Status   05/28/2024 4.6 3.5 - 5.1 mmol/L Final     Chloride   Date Value Ref  Range Status   05/28/2024 103 95 - 110 mmol/L Final     CO2   Date Value Ref Range Status   05/28/2024 27 23 - 29 mmol/L Final     Glucose   Date Value Ref Range Status   05/28/2024 90 70 - 110 mg/dL Final     BUN   Date Value Ref Range Status   05/28/2024 11 6 - 20 mg/dL Final     Creatinine   Date Value Ref Range Status   05/28/2024 0.9 0.5 - 1.4 mg/dL Final     Calcium   Date Value Ref Range Status   05/28/2024 9.8 8.7 - 10.5 mg/dL Final     Total Protein   Date Value Ref Range Status   05/28/2024 7.5 6.0 - 8.4 g/dL Final     Albumin   Date Value Ref Range Status   05/28/2024 4.0 3.5 - 5.2 g/dL Final     Total Bilirubin   Date Value Ref Range Status   05/28/2024 0.4 0.1 - 1.0 mg/dL Final     Comment:     For infants and newborns, interpretation of results should be based  on gestational age, weight and in agreement with clinical  observations.    Premature Infant recommended reference ranges:  Up to 24 hours.............<8.0 mg/dL  Up to 48 hours............<12.0 mg/dL  3-5 days..................<15.0 mg/dL  6-29 days.................<15.0 mg/dL       Alkaline Phosphatase   Date Value Ref Range Status   05/28/2024 78 55 - 135 U/L Final     AST   Date Value Ref Range Status   05/28/2024 18 10 - 40 U/L Final     ALT   Date Value Ref Range Status   05/28/2024 12 10 - 44 U/L Final     Anion Gap   Date Value Ref Range Status   05/28/2024 7 (L) 8 - 16 mmol/L Final     eGFR if    Date Value Ref Range Status   07/15/2021 >60 >60 mL/min/1.73 m^2 Final     eGFR if non    Date Value Ref Range Status   07/15/2021 >60 >60 mL/min/1.73 m^2 Final     Comment:     Calculation used to obtain the estimated glomerular filtration  rate (eGFR) is the CKD-EPI equation.        Lab Results   Component Value Date    HEPBSAG Non-reactive 05/28/2024    HEPBSAB Positive 06/17/2022    HEPBCAB Non-reactive 05/28/2024           MS Impression and Plan:     NEURO MULTIPLE SCLEROSIS IMPRESSION:   Number of relapses  in the past year?:  0  Clinical Progression:  Clinically Stable  MRI Progression:  Stable  MS Classification:  Relapsing-Remitting MS  DMT:  No change in management  DMT comment:  She would like to continue Ocrevus since hair loss seems to have leveled out.  Her infusion is scheduled on 7/26. This is 3 weeks after her surgery, so she is ok to proceed with infusion then. She will let us know if any complications arise after surgery. She is aware of the risks associated with immunosuppressant therapy, including increased risk of infection.     Supplements:  Vit D (Continue vitamin D.)     MRI brain is due in December.   She will follow up with Dr. Matthews in 6 months.   The visit today is associated with current or anticipated ongoing medical care related to this patient's single serious condition/complex condition of multiple sclerosis.          SHALOM Donohue, CNS    Problem List Items Addressed This Visit          Neurologic Problems    Multiple sclerosis - Primary (Chronic)    Relevant Orders    MRI Brain Demyelinating Without Contrast

## 2024-06-10 ENCOUNTER — OFFICE VISIT (OUTPATIENT)
Dept: NEUROLOGY | Facility: CLINIC | Age: 54
End: 2024-06-10
Payer: COMMERCIAL

## 2024-06-10 ENCOUNTER — PATIENT MESSAGE (OUTPATIENT)
Dept: NEUROLOGY | Facility: CLINIC | Age: 54
End: 2024-06-10

## 2024-06-10 DIAGNOSIS — G35 MULTIPLE SCLEROSIS: Primary | ICD-10-CM

## 2024-06-10 PROCEDURE — G2211 COMPLEX E/M VISIT ADD ON: HCPCS | Mod: 95,,, | Performed by: CLINICAL NURSE SPECIALIST

## 2024-06-10 PROCEDURE — 99214 OFFICE O/P EST MOD 30 MIN: CPT | Mod: 95,,, | Performed by: CLINICAL NURSE SPECIALIST

## 2024-06-12 ENCOUNTER — TELEPHONE (OUTPATIENT)
Dept: NEUROLOGY | Facility: CLINIC | Age: 54
End: 2024-06-12
Payer: COMMERCIAL

## 2024-06-12 NOTE — TELEPHONE ENCOUNTER
Ocrevus scheduled 7/26/24. Faxed orders for Ocrevus to [facility] on 6/19    Labs   5/28/24  WBC 5.81  ALC 1598  AST 18, ALT 12  IgG 1017 IgM 56 IgA 513  HBsAg - anti-Hbc -, no current or past infection

## 2024-07-11 ENCOUNTER — TELEPHONE (OUTPATIENT)
Dept: NEUROLOGY | Facility: CLINIC | Age: 54
End: 2024-07-11
Payer: COMMERCIAL

## 2024-07-11 NOTE — TELEPHONE ENCOUNTER
----- Message from Milagro Magana RN sent at 7/11/2024 10:10 AM CDT -----  Regarding: MATTIE: Trish  Contact: [phone] (option 4)    ----- Message -----  From: Jessica Ro  Sent: 7/11/2024   8:35 AM CDT  To: Byron GARCIA Staff  Subject: Trish maravilla [facility] is calling to speak to nurse or someone from office in reference to a referral they received for pt's Occrevus infusion. She states they are needing additional documentation. Please give [facility] a call.     Yessneia: [phone] (option 4)

## 2024-07-25 ENCOUNTER — PATIENT MESSAGE (OUTPATIENT)
Dept: PSYCHIATRY | Facility: CLINIC | Age: 54
End: 2024-07-25
Payer: COMMERCIAL

## 2024-07-26 ENCOUNTER — TELEPHONE (OUTPATIENT)
Dept: NEUROLOGY | Facility: CLINIC | Age: 54
End: 2024-07-26
Payer: COMMERCIAL

## 2024-07-26 NOTE — TELEPHONE ENCOUNTER
----- Message from Milagro Magana RN sent at 7/25/2024  4:38 PM CDT -----  Regarding: FW: pharmacy advise  Contact: Pharmacy @  [phone] ext [phone]  wow  ----- Message -----  From: Brown Pimentel  Sent: 7/25/2024   4:36 PM CDT  To: Cecil SALAZAR Staff  Subject: pharmacy advise                                  Pharmacy is calling to be advised of the routes,doses, quantity, and frequency for premeds for Rx 0.9% NaCl SolP 500 mL with ocrelizumab 30 mg/mL Soln. Please call to advise further. Thank you for all you doing.

## 2024-08-05 ENCOUNTER — PATIENT MESSAGE (OUTPATIENT)
Dept: PSYCHIATRY | Facility: CLINIC | Age: 54
End: 2024-08-05
Payer: COMMERCIAL

## 2024-08-21 DIAGNOSIS — M62.838 MUSCLE SPASM: ICD-10-CM

## 2024-08-22 RX ORDER — TIZANIDINE 4 MG/1
4 TABLET ORAL NIGHTLY
Qty: 90 TABLET | Refills: 1 | Status: SHIPPED | OUTPATIENT
Start: 2024-08-22

## 2024-08-30 ENCOUNTER — DOCUMENTATION ONLY (OUTPATIENT)
Dept: NEUROLOGY | Facility: CLINIC | Age: 54
End: 2024-08-30
Payer: COMMERCIAL

## 2024-10-01 ENCOUNTER — OFFICE VISIT (OUTPATIENT)
Dept: NEUROLOGY | Facility: CLINIC | Age: 54
End: 2024-10-01
Payer: COMMERCIAL

## 2024-10-01 DIAGNOSIS — Z79.899 HIGH RISK MEDICATION USE: ICD-10-CM

## 2024-10-01 DIAGNOSIS — M79.7 FIBROMYALGIA: ICD-10-CM

## 2024-10-01 DIAGNOSIS — Z71.89 COUNSELING REGARDING GOALS OF CARE: ICD-10-CM

## 2024-10-01 DIAGNOSIS — G35 MULTIPLE SCLEROSIS: Primary | ICD-10-CM

## 2024-10-01 DIAGNOSIS — Z29.89 PROPHYLACTIC IMMUNOTHERAPY: ICD-10-CM

## 2024-10-01 PROCEDURE — 99213 OFFICE O/P EST LOW 20 MIN: CPT | Mod: 95,,, | Performed by: CLINICAL NURSE SPECIALIST

## 2024-10-01 PROCEDURE — G2211 COMPLEX E/M VISIT ADD ON: HCPCS | Mod: 95,,, | Performed by: CLINICAL NURSE SPECIALIST

## 2024-10-01 RX ORDER — PREGABALIN 75 MG/1
75 CAPSULE ORAL 3 TIMES DAILY
Qty: 90 CAPSULE | Refills: 1 | Status: SHIPPED | OUTPATIENT
Start: 2024-10-01 | End: 2025-04-01

## 2024-10-01 NOTE — PROGRESS NOTES
"Subjective:          Patient ID: Kasandra Garcia is a 54 y.o. female who presents today for a routine clinic visit for MS.  She was last seen in June 2024. The history has been provided by the patient.     The patient location is: her home   The chief complaint leading to consultation is: MS     Visit type: audiovisual    Face to Face time with patient: 11 minutes   20 minutes of total time spent on the encounter, which includes face to face time and non-face to face time preparing to see the patient (eg, review of tests), Obtaining and/or reviewing separately obtained history, Documenting clinical information in the electronic or other health record, Independently interpreting results (not separately reported) and communicating results to the patient/family/caregiver, or Care coordination (not separately reported).         Each patient to whom he or she provides medical services by telemedicine is:  (1) informed of the relationship between the physician and patient and the respective role of any other health care provider with respect to management of the patient; and (2) notified that he or she may decline to receive medical services by telemedicine and may withdraw from such care at any time.      MS HPI:  DMT: Ocrevus--scheduled 8/24/24; due in February 2025   Side effects from DMT? Felt "yucky" for a few days after infusion; feeling more tired in the last month since infusion   Taking vitamin D3 as recommended? Yes -  Dose: 5000 units daily   She did well with cosmetic surgery over the summer.   She has been more tired in the last few weeks. She is feeling better today. She denies any recent infections.   She is not doing any formal exercise, but stays active.   She denies any new symptoms or concerns for relapse.     Medications:  Current Outpatient Medications   Medication Sig    0.9% NaCl SolP 500 mL with ocrelizumab 30 mg/mL Soln Infuse Ocrevus 600mg in 500mL of 0.9% NaCl IV every 24 weeks. Pre-meds: " EXAMINATION TYPE: XR chest 1V portable

 

DATE OF EXAM: 5/12/2017 7:08 AM

 

COMPARISON: 5/11/2017

 

HISTORY: Postop

 

TECHNIQUE: Single frontal view of the chest is obtained.

 

FINDINGS:  There is now a small less than 10% right apical pneumothorax with subcutaneous emphysema. 
Chest tube stable. Bilateral areas of subsegmental consolidation. Limited inspiration. 

 

IMPRESSION: 

1. Stable development of a less than 10% right apical pneumothorax. Solumedrol 100mg IVPB, Benadryl 50mg IVPB, Tylenol 1000mg PO, Pepcid 20mg IVPB    ARIPiprazole (ABILIFY) 2 MG Tab Take 1 tablet (2 mg total) by mouth once daily.    cholecalciferol, vitamin D3, (VITAMIN D3) 5,000 unit Tab Take 5,000 Units by mouth once daily.    COLLAGEN-BIOTIN-ASCORBIC ACID ORAL  (Patient not taking: Reported on 6/10/2024)    estradiol 0.05 mg/24 hr td ptsw (VIVELLE-DOT) 0.05 mg/24 hr     FLUoxetine 40 MG capsule TAKE 1 CAPSULE ONCE DAILY    hydroquinone 4 % Crea APPLY TO HANDS DAILY FOR DARK SPOTS (Patient not taking: Reported on 6/10/2024)    ketoconazole (NIZORAL) 2 % shampoo Apply topically once a week. Lather in for 5-10 min before rinsing    linaCLOtide (LINZESS) 72 mcg Cap capsule Take 1 capsule by mouth every morning. (Patient not taking: Reported on 6/10/2024)    multivitamin with minerals (MULTI-VIT 55 PLUS ORAL)  (Patient not taking: Reported on 6/10/2024)    nitrofurantoin, macrocrystal-monohydrate, (MACROBID) 100 MG capsule Take 100 mg by mouth. Daily for UTI prevention    oxybutynin (DITROPAN-XL) 5 MG TR24 Take 5 mg by mouth.    pregabalin (LYRICA) 100 MG capsule TAKE 1 CAPSULE TWICE DAILY    tiZANidine (ZANAFLEX) 4 MG tablet TAKE 1 TABLET EVERY EVENING    traZODone (DESYREL) 50 MG tablet TAKE 1 TABLET EVERY EVENING     No current facility-administered medications for this visit.       SOCIAL HISTORY  Social History     Tobacco Use    Smoking status: Never    Smokeless tobacco: Never   Substance Use Topics    Alcohol use: No     Alcohol/week: 0.0 standard drinks of alcohol    Drug use: No       Living arrangements - the patient lives with her      ROS:      3/30/2024     7:47 AM   REVIEW OF SYMPTOMS   Do you feel abnormally tired on most days? Yes    Do you feel you generally sleep well? Yes    Do you have difficulty controlling your bladder?  No --takes Macrobid for UTI prevention; takes oxybutynin   Do you have difficulty controlling your bowels?  No --takes Miralax    Do you  have frequent muscle cramps, tightness or spasms in your limbs?  Yes --takes tizanidine at bedtime; has muscle soreness and tightness throughout the day   Do you have new visual symptoms?  No --sees eye doctor annually   Do you have worsening difficulty with your memory or thinking? No --not worse    Do you have worsening symptoms of anxiety or depression?  No --not feeling depressed    For patients who walk, Do you have more difficulty walking?  No    Have you fallen since your last visit?  No ---balance is not worse; denies falls    For patients who use wheelchairs: Do you have any skin wounds or breakdown? Not Applicable    Do you have difficulty using your hands?  No    Do you have shooting or burning pain? Yes --tingling in hands at times that comes and goes    Do you have difficulty with sexual function?  No    If you are sexually active, are you using birth control? Y/N  N/A No    Do you often choke when swallowing liquids or solid food?  No    Do you experience worsening symptoms when overheated? No --avoids the heat; the winter is worse for her    Do you need any new equipment such as a wheelchair, walker or shower chair? No    Do you receive co-pay financial assistance for your principal MS medicine? Yes    Would you be interested in participating in an MS research trial in the future? No    For patients on Gilenya, Tecfidera, Aubagio, Rituxan, Ocrevus, Tysabri, Lemtrada or Methotrexate, are you aware that you should NOT receive live virus vaccines?  Yes    Do you feel you have adequate family/friend support?  Yes    Do you have health insurance?   Yes    Are you currently employed? No    Do you receive SSDI/SSI?  No    Do you use marijuana or cannabis products? No    Have you been diagnosed with a urinary tract infection since your last visit here? No    Have you been diagnosed with a respiratory tract infection since your last visit here? No    Have you been to the emergency room since your last visit  here? No    Have you been hospitalized since your last visit here?  No        Patient-reported            Objective:        1. 25 foot timed walk:      4/24/2023    10:25 AM 4/1/2024    10:12 AM   Timed 25 Foot Walk:   Did patient wear an AFO? No No   Was assistive device used? No No   Time for 25 Foot Walk (seconds) 3.7 3.4     Neurological Exam    Deferred   Imaging:     Results for orders placed during the hospital encounter of 12/08/23    MRI Brain Demyelinating Without Contrast    Impression  Stable in noncontrast MRI of the brain.  Multiple white matter lesions consistent with demyelinating plaques of multiple sclerosis.  No new findings.      Electronically signed by: Aman Pastor MD  Date:    12/09/2023  Time:    09:24    Labs:     Lab Results   Component Value Date    [id number] 106 (H) 12/13/2023    LOBBIVBJ67TR 53 07/15/2021    LWMGRBYC13EV 60 08/14/2020       Lab Results   Component Value Date    WBC 5.81 05/28/2024    RBC 4.90 05/28/2024    HGB 14.6 05/28/2024    HCT 45.7 05/28/2024    MCV 93 05/28/2024    MCH 29.8 05/28/2024    MCHC 31.9 (L) 05/28/2024    RDW 12.0 05/28/2024     05/28/2024    MPV 10.0 05/28/2024    GRAN 3.6 05/28/2024    GRAN 61.2 05/28/2024    LYMPH 1.6 05/28/2024    LYMPH 27.5 05/28/2024    MONO 0.4 05/28/2024    MONO 7.1 05/28/2024    EOS 0.2 05/28/2024    BASO 0.05 05/28/2024    EOSINOPHIL 3.1 05/28/2024    BASOPHIL 0.9 05/28/2024     Sodium   Date Value Ref Range Status   05/28/2024 137 136 - 145 mmol/L Final     Potassium   Date Value Ref Range Status   05/28/2024 4.6 3.5 - 5.1 mmol/L Final     Chloride   Date Value Ref Range Status   05/28/2024 103 95 - 110 mmol/L Final     CO2   Date Value Ref Range Status   05/28/2024 27 23 - 29 mmol/L Final     Glucose   Date Value Ref Range Status   05/28/2024 90 70 - 110 mg/dL Final     BUN   Date Value Ref Range Status   05/28/2024 11 6 - 20 mg/dL Final     Creatinine   Date Value Ref Range Status   05/28/2024 0.9 0.5 - 1.4  mg/dL Final     Calcium   Date Value Ref Range Status   05/28/2024 9.8 8.7 - 10.5 mg/dL Final     Total Protein   Date Value Ref Range Status   05/28/2024 7.5 6.0 - 8.4 g/dL Final     Albumin   Date Value Ref Range Status   05/28/2024 4.0 3.5 - 5.2 g/dL Final     Total Bilirubin   Date Value Ref Range Status   05/28/2024 0.4 0.1 - 1.0 mg/dL Final     Comment:     For infants and newborns, interpretation of results should be based  on gestational age, weight and in agreement with clinical  observations.    Premature Infant recommended reference ranges:  Up to 24 hours.............<8.0 mg/dL  Up to 48 hours............<12.0 mg/dL  3-5 days..................<15.0 mg/dL  6-29 days.................<15.0 mg/dL       Alkaline Phosphatase   Date Value Ref Range Status   05/28/2024 78 55 - 135 U/L Final     AST   Date Value Ref Range Status   05/28/2024 18 10 - 40 U/L Final     ALT   Date Value Ref Range Status   05/28/2024 12 10 - 44 U/L Final     Anion Gap   Date Value Ref Range Status   05/28/2024 7 (L) 8 - 16 mmol/L Final     eGFR if    Date Value Ref Range Status   07/15/2021 >60 >60 mL/min/1.73 m^2 Final     eGFR if non    Date Value Ref Range Status   07/15/2021 >60 >60 mL/min/1.73 m^2 Final     Comment:     Calculation used to obtain the estimated glomerular filtration  rate (eGFR) is the CKD-EPI equation.        Lab Results   Component Value Date    HEPBSAG Non-reactive 05/28/2024    HEPBSAB Positive 06/17/2022    HEPBCAB Non-reactive 05/28/2024           MS Impression and Plan:     NEURO MULTIPLE SCLEROSIS IMPRESSION:   Number of relapses in the past year?:  0  Clinical Progression:  Clinically Stable  MS Classification:  Relapsing-Remitting MS  DMT:  No change in management  DMT comment:  Continue Ocrevus and Vitamin D. Her next infusion is due in February. We will check safety labs in January. She is aware of the risks associated with immunosuppressant therapy, including increased  risk of infection.     Symptom Management:  Implement change in symptom management  Implement Change in Symptom Management:  Pain (Muscle soreness/tightness might be more fibromyalgia than MS pain. She will discontinue Lyrica 100mg twice daily and try 75mg three times daily. New rx sent to pharmacy.)  MRI brain is due in December  She will follow up with Dr. Matthews in February in person.   The visit today is associated with current or anticipated ongoing medical care related to this patient's single serious condition/complex condition of multiple sclerosis.        SHALOM Donohue, CNS    Problem List Items Addressed This Visit          Neurologic Problems    Multiple sclerosis (Chronic)    Relevant Medications    pregabalin (LYRICA) 75 MG capsule    Other Relevant Orders    CBC Auto Differential    Comprehensive Metabolic Panel    Hepatitis B Core Antibody, Total    Hepatitis B Surface Antigen    Immunoglobulins (IgG, IgA, IgM) Quantitative     Other Visit Diagnoses       Fibromyalgia    -  Primary    Relevant Medications    pregabalin (LYRICA) 75 MG capsule

## 2024-10-04 ENCOUNTER — TELEPHONE (OUTPATIENT)
Dept: NEUROLOGY | Facility: CLINIC | Age: 54
End: 2024-10-04
Payer: COMMERCIAL

## 2024-10-04 NOTE — TELEPHONE ENCOUNTER
Spoke with pt in regards to getting pt scheduled for MRI, labs, and f/u. Pt has been given the number to [facility] to get scheduled for MRIs, pt is scheduled on 1/6/25 to have labs done and 2/10/25 for a f/u with [facility].

## 2024-10-10 DIAGNOSIS — G47.00 INSOMNIA, UNSPECIFIED TYPE: ICD-10-CM

## 2024-10-10 RX ORDER — TRAZODONE HYDROCHLORIDE 50 MG/1
TABLET ORAL
Qty: 90 TABLET | Refills: 1 | Status: SHIPPED | OUTPATIENT
Start: 2024-10-10

## 2024-11-10 DIAGNOSIS — L65.9 ALOPECIA: ICD-10-CM

## 2024-11-12 RX ORDER — KETOCONAZOLE 20 MG/ML
SHAMPOO, SUSPENSION TOPICAL
Qty: 120 ML | Refills: 5 | Status: SHIPPED | OUTPATIENT
Start: 2024-11-12

## 2024-12-16 ENCOUNTER — PATIENT MESSAGE (OUTPATIENT)
Dept: PSYCHIATRY | Facility: CLINIC | Age: 54
End: 2024-12-16
Payer: COMMERCIAL

## 2024-12-23 ENCOUNTER — PATIENT MESSAGE (OUTPATIENT)
Dept: NEUROLOGY | Facility: CLINIC | Age: 54
End: 2024-12-23
Payer: COMMERCIAL

## 2024-12-23 DIAGNOSIS — G35 MULTIPLE SCLEROSIS: ICD-10-CM

## 2024-12-23 RX ORDER — DIAZEPAM 5 MG/1
TABLET ORAL
Qty: 3 TABLET | Refills: 0 | Status: SHIPPED | OUTPATIENT
Start: 2024-12-23

## 2024-12-24 DIAGNOSIS — M79.2 NEUROPATHIC PAIN: ICD-10-CM

## 2024-12-26 ENCOUNTER — HOSPITAL ENCOUNTER (OUTPATIENT)
Dept: RADIOLOGY | Facility: HOSPITAL | Age: 54
Discharge: HOME | End: 2024-12-26
Attending: CLINICAL NURSE SPECIALIST
Payer: COMMERCIAL

## 2024-12-26 ENCOUNTER — PATIENT MESSAGE (OUTPATIENT)
Dept: NEUROLOGY | Facility: CLINIC | Age: 54
End: 2024-12-26
Payer: COMMERCIAL

## 2024-12-26 DIAGNOSIS — G35 MULTIPLE SCLEROSIS: ICD-10-CM

## 2024-12-26 PROCEDURE — 70551 MRI BRAIN STEM W/O DYE: CPT | Mod: 26,,, | Performed by: RADIOLOGY

## 2024-12-26 PROCEDURE — 70551 MRI BRAIN STEM W/O DYE: CPT | Mod: TC,PN

## 2024-12-27 RX ORDER — PREGABALIN 100 MG/1
100 CAPSULE ORAL 2 TIMES DAILY
Qty: 180 CAPSULE | Refills: 1 | Status: SHIPPED | OUTPATIENT
Start: 2024-12-27

## 2025-01-06 ENCOUNTER — TELEPHONE (OUTPATIENT)
Dept: NEUROLOGY | Facility: CLINIC | Age: 55
End: 2025-01-06
Payer: COMMERCIAL

## 2025-01-06 NOTE — TELEPHONE ENCOUNTER
Ocrevus due 2/20/25. Lab appt 1/17/25. Patient infuses at home with [facility] (no longer in network). Will f/u on orders after lab appt    Spoke to [facility] (Phone # - [phone], CIT team - core of excellence Team), rep June regarding Ocrevus. Received fax for Ocrevus PA Key #[id number] however PA stating PA not required - PA has been resolved. June Faxing nursing orders over for Ocrevus. Advised June patient has labs on 1/17/25 and once labs are completed nursing orders + Ocrevus orders can be faxed over. Call completed on 1/8/25    Spoke to the patient and advised she will now have to use [facility]

## 2025-01-10 ENCOUNTER — TELEPHONE (OUTPATIENT)
Dept: NEUROLOGY | Facility: CLINIC | Age: 55
End: 2025-01-10
Payer: COMMERCIAL

## 2025-01-10 NOTE — TELEPHONE ENCOUNTER
Gwen Taveras at [facility] once labs are completed nursing orders will be sent over

## 2025-01-10 NOTE — TELEPHONE ENCOUNTER
----- Message from Becky sent at 1/10/2025 10:22 AM CST -----  Regarding: [facility] states still [facility]vent received [facility] order for Ocrevus  Name of Who is Calling:        What is the request in detail:[facility] states still [facility]vent [facility] Nursing order for Ocrevus. Please advise           Can the clinic reply by [facility]:No        What Number to Call Back if not in [facility]: [phone]

## 2025-01-17 ENCOUNTER — LAB VISIT (OUTPATIENT)
Dept: LAB | Facility: HOSPITAL | Age: 55
End: 2025-01-17
Attending: CLINICAL NURSE SPECIALIST
Payer: COMMERCIAL

## 2025-01-17 DIAGNOSIS — G35 MULTIPLE SCLEROSIS: ICD-10-CM

## 2025-01-17 PROCEDURE — 36415 COLL VENOUS BLD VENIPUNCTURE: CPT | Mod: PN | Performed by: CLINICAL NURSE SPECIALIST

## 2025-01-17 PROCEDURE — 80053 COMPREHEN METABOLIC PANEL: CPT | Performed by: CLINICAL NURSE SPECIALIST

## 2025-01-18 LAB
ALBUMIN SERPL BCP-MCNC: 3.8 G/DL (ref 3.5–5.2)
ALP SERPL-CCNC: 82 U/L (ref 40–150)
ALT SERPL W/O P-5'-P-CCNC: 14 U/L (ref 10–44)
ANION GAP SERPL CALC-SCNC: 11 MMOL/L (ref 8–16)
AST SERPL-CCNC: 19 U/L (ref 10–40)
BILIRUB SERPL-MCNC: 0.3 MG/DL (ref 0.1–1)
BUN SERPL-MCNC: 11 MG/DL (ref 6–20)
CALCIUM SERPL-MCNC: 9.5 MG/DL (ref 8.7–10.5)
CHLORIDE SERPL-SCNC: 103 MMOL/L (ref 95–110)
CO2 SERPL-SCNC: 25 MMOL/L (ref 23–29)
CREAT SERPL-MCNC: 0.9 MG/DL (ref 0.5–1.4)
EST. GFR  (NO RACE VARIABLE): >60 ML/MIN/1.73 M^2
GLUCOSE SERPL-MCNC: 97 MG/DL (ref 70–110)
POTASSIUM SERPL-SCNC: 4.6 MMOL/L (ref 3.5–5.1)
PROT SERPL-MCNC: 7.7 G/DL (ref 6–8.4)
SODIUM SERPL-SCNC: 139 MMOL/L (ref 136–145)

## 2025-01-22 ENCOUNTER — TELEPHONE (OUTPATIENT)
Dept: NEUROLOGY | Facility: CLINIC | Age: 55
End: 2025-01-22
Payer: COMMERCIAL

## 2025-01-22 ENCOUNTER — PATIENT MESSAGE (OUTPATIENT)
Dept: NEUROLOGY | Facility: CLINIC | Age: 55
End: 2025-01-22
Payer: COMMERCIAL

## 2025-01-22 NOTE — TELEPHONE ENCOUNTER
"----- Message from SHERLEY Humphrey sent at 1/22/2025  2:13 PM CST -----  Regarding: FW: Pt advice  Contact: [phone] option 2    ----- Message -----  From: Octavia Robles  Sent: 1/22/2025  12:42 PM CST  To: Cecil SALAZAR Staff  Subject: Pt advice                                        .Name Of Caller: [facility]      Contact Preference?:[phone] option 2     What is the nature of the call?: in reference to status of request orders nursing for  pt medication OCREVUS that was sent over. Pls call       FAX [phone]     Additional Notes:  "Thank you for all that you do for our patients"  "

## 2025-01-23 DIAGNOSIS — G35 MULTIPLE SCLEROSIS: Primary | ICD-10-CM

## 2025-01-24 ENCOUNTER — LAB VISIT (OUTPATIENT)
Dept: LAB | Facility: HOSPITAL | Age: 55
End: 2025-01-24
Attending: CLINICAL NURSE SPECIALIST
Payer: COMMERCIAL

## 2025-01-24 DIAGNOSIS — G35 MULTIPLE SCLEROSIS: ICD-10-CM

## 2025-01-24 PROCEDURE — 36415 COLL VENOUS BLD VENIPUNCTURE: CPT | Mod: PN | Performed by: CLINICAL NURSE SPECIALIST

## 2025-01-24 PROCEDURE — 82784 ASSAY IGA/IGD/IGG/IGM EACH: CPT | Mod: 59 | Performed by: CLINICAL NURSE SPECIALIST

## 2025-01-24 PROCEDURE — 86704 HEP B CORE ANTIBODY TOTAL: CPT | Performed by: CLINICAL NURSE SPECIALIST

## 2025-01-24 PROCEDURE — 87340 HEPATITIS B SURFACE AG IA: CPT | Performed by: CLINICAL NURSE SPECIALIST

## 2025-01-24 PROCEDURE — 82306 VITAMIN D 25 HYDROXY: CPT | Performed by: CLINICAL NURSE SPECIALIST

## 2025-01-24 PROCEDURE — 85025 COMPLETE CBC W/AUTO DIFF WBC: CPT | Performed by: CLINICAL NURSE SPECIALIST

## 2025-01-25 LAB
25(OH)D3+25(OH)D2 SERPL-MCNC: 54 NG/ML (ref 30–96)
BASOPHILS # BLD AUTO: 0.05 K/UL (ref 0–0.2)
BASOPHILS NFR BLD: 0.8 % (ref 0–1.9)
DIFFERENTIAL METHOD BLD: NORMAL
EOSINOPHIL # BLD AUTO: 0.1 K/UL (ref 0–0.5)
EOSINOPHIL NFR BLD: 1.3 % (ref 0–8)
ERYTHROCYTE [DISTWIDTH] IN BLOOD BY AUTOMATED COUNT: 12.9 % (ref 11.5–14.5)
HBV CORE AB SERPL QL IA: NORMAL
HBV SURFACE AG SERPL QL IA: NORMAL
HCT VFR BLD AUTO: 41 % (ref 37–48.5)
HGB BLD-MCNC: 13.3 G/DL (ref 12–16)
IGA SERPL-MCNC: 464 MG/DL (ref 40–350)
IGG SERPL-MCNC: 967 MG/DL (ref 650–1600)
IGM SERPL-MCNC: 53 MG/DL (ref 50–300)
IMM GRANULOCYTES # BLD AUTO: 0.02 K/UL (ref 0–0.04)
IMM GRANULOCYTES NFR BLD AUTO: 0.3 % (ref 0–0.5)
LYMPHOCYTES # BLD AUTO: 1.6 K/UL (ref 1–4.8)
LYMPHOCYTES NFR BLD: 24.3 % (ref 18–48)
MCH RBC QN AUTO: 29.8 PG (ref 27–31)
MCHC RBC AUTO-ENTMCNC: 32.4 G/DL (ref 32–36)
MCV RBC AUTO: 92 FL (ref 82–98)
MONOCYTES # BLD AUTO: 0.5 K/UL (ref 0.3–1)
MONOCYTES NFR BLD: 8.3 % (ref 4–15)
NEUTROPHILS # BLD AUTO: 4.2 K/UL (ref 1.8–7.7)
NEUTROPHILS NFR BLD: 65 % (ref 38–73)
NRBC BLD-RTO: 0 /100 WBC
PLATELET # BLD AUTO: 361 K/UL (ref 150–450)
PMV BLD AUTO: 10.7 FL (ref 9.2–12.9)
RBC # BLD AUTO: 4.46 M/UL (ref 4–5.4)
WBC # BLD AUTO: 6.38 K/UL (ref 3.9–12.7)

## 2025-01-30 ENCOUNTER — PATIENT MESSAGE (OUTPATIENT)
Dept: NEUROLOGY | Facility: CLINIC | Age: 55
End: 2025-01-30
Payer: COMMERCIAL

## 2025-02-03 ENCOUNTER — TELEPHONE (OUTPATIENT)
Dept: NEUROLOGY | Facility: CLINIC | Age: 55
End: 2025-02-03
Payer: COMMERCIAL

## 2025-02-03 NOTE — TELEPHONE ENCOUNTER
"----- Message from SHERLEY Humphrey sent at 1/31/2025  2:10 PM CST -----  Regarding: FW: pt advice  Contact: [phone]    ----- Message -----  From: Octavia Robles  Sent: 1/31/2025  10:41 AM CST  To: Cecil SALAZAR Staff  Subject: pt advice                                        .Name Of Caller: [facility]      Contact Preference?:[phone]     What is the nature of the call?:in reference to new admission for OCREVUS, pls call       FAX [phone]    M-F 8-8pm      Additional Notes:  "Thank you for all that you do for our patients"  "

## 2025-02-04 ENCOUNTER — TELEPHONE (OUTPATIENT)
Dept: NEUROLOGY | Facility: CLINIC | Age: 55
End: 2025-02-04
Payer: COMMERCIAL

## 2025-02-04 NOTE — TELEPHONE ENCOUNTER
----- Message from SHERLEY Humphrey sent at 2/4/2025 10:03 AM CST -----    ----- Message -----  From: Juliet Khan  Sent: 2/4/2025   9:44 AM CST  To: Cecil SALAZAR Staff    Name of Who is Calling:AURA DENNIS [[id number]]([facility])        What is the request in detail:[facility] regarding a call back regarding pt if possible today.        Can the clinic reply by [facility]:Call        What Number to Call Back if not in [facility]:Telephone Information:  Mobile          [phone]      [facility] [phone]

## 2025-02-05 ENCOUNTER — TELEPHONE (OUTPATIENT)
Dept: NEUROLOGY | Facility: CLINIC | Age: 55
End: 2025-02-05
Payer: COMMERCIAL

## 2025-02-05 NOTE — TELEPHONE ENCOUNTER
"----- Message from SHERLEY Humphrey sent at 2/5/2025 10:59 AM CST -----  Regarding: FW: pt advice  Contact: [phone] ext [phone]    ----- Message -----  From: Octavia Robles  Sent: 2/4/2025   2:01 PM CST  To: Cecil SALAZAR Staff  Subject: pt advice                                        .Name Of Caller: [facility]      Contact Preference?:[phone] ext [phone]     What is the nature of the call?:in reference to needing a new prescription for pt medication               OCREVUS. Pls call           FAX [phone]      Additional Notes:  "Thank you for all that you do for our patients"  "

## 2025-02-10 ENCOUNTER — OFFICE VISIT (OUTPATIENT)
Dept: NEUROLOGY | Facility: CLINIC | Age: 55
End: 2025-02-10
Payer: COMMERCIAL

## 2025-02-10 VITALS
BODY MASS INDEX: 32.71 KG/M2 | WEIGHT: 203.5 LBS | SYSTOLIC BLOOD PRESSURE: 130 MMHG | DIASTOLIC BLOOD PRESSURE: 84 MMHG | HEART RATE: 61 BPM | HEIGHT: 66 IN

## 2025-02-10 DIAGNOSIS — Z71.89 COUNSELING REGARDING GOALS OF CARE: ICD-10-CM

## 2025-02-10 DIAGNOSIS — Z29.89 IMMUNOTHERAPY: ICD-10-CM

## 2025-02-10 DIAGNOSIS — Z29.89 PROPHYLACTIC IMMUNOTHERAPY: ICD-10-CM

## 2025-02-10 DIAGNOSIS — G35 MS (MULTIPLE SCLEROSIS): Primary | ICD-10-CM

## 2025-02-10 PROCEDURE — G2211 COMPLEX E/M VISIT ADD ON: HCPCS | Mod: S$GLB,,, | Performed by: PSYCHIATRY & NEUROLOGY

## 2025-02-10 PROCEDURE — 99214 OFFICE O/P EST MOD 30 MIN: CPT | Mod: S$GLB,,, | Performed by: PSYCHIATRY & NEUROLOGY

## 2025-02-10 PROCEDURE — 99999 PR PBB SHADOW E&M-EST. PATIENT-LVL III: CPT | Mod: PBBFAC,,, | Performed by: PSYCHIATRY & NEUROLOGY

## 2025-02-10 NOTE — PROGRESS NOTES
Subjective:          Patient ID: Kasandra Garcia is a 54 y.o. female who presents today for a routine clinic visit for MS.      MS HPI:  DMT: ocrelizumab - home infusion February / August  Side effects from DMT? No  Taking vitamin D3 as recommended? Yes - 5,000 IU/day  Feeling neurologically stable   No sense of relapse or progressive decline  Patient denies frequent, severe or unusual infections.  Back on Lyrica 100mg BID -- easier dosing  On daily Macrobid  On oxybutynin 5mg/day   On tizanidine 4mg hs   Trazone 50mg / day   Had a stable MRI in December  She has 3 grandchildren; she takes care of the 1 y/o every day    Medications:  Current Outpatient Medications   Medication Sig    0.9% NaCl SolP 500 mL with ocrelizumab 30 mg/mL Soln Infuse Ocrevus 600mg in 500mL of 0.9% NaCl IV every 24 weeks. Pre-meds: Solumedrol 100mg IVPB, Benadryl 50mg IVPB, Tylenol 1000mg PO, Pepcid 20mg IVPB    ARIPiprazole (ABILIFY) 2 MG Tab Take 1 tablet (2 mg total) by mouth once daily.    cholecalciferol, vitamin D3, (VITAMIN D3) 5,000 unit Tab Take 5,000 Units by mouth once daily.    estradiol 0.05 mg/24 hr td ptsw (VIVELLE-DOT) 0.05 mg/24 hr     FLUoxetine 40 MG capsule TAKE 1 CAPSULE ONCE DAILY    ketoconazole (NIZORAL) 2 % shampoo APPLY TOPICALLY ONCE A     WEEK, LATHER IN FOR 5-10   MINUTES BEFORE RINSING    nitrofurantoin, macrocrystal-monohydrate, (MACROBID) 100 MG capsule Take 100 mg by mouth. Daily for UTI prevention    oxybutynin (DITROPAN-XL) 5 MG TR24 Take 5 mg by mouth.    pregabalin (LYRICA) 100 MG capsule TAKE 1 CAPSULE TWICE DAILY    tiZANidine (ZANAFLEX) 4 MG tablet TAKE 1 TABLET EVERY EVENING    traZODone (DESYREL) 50 MG tablet TAKE 1 TABLET EVERY EVENING     No current facility-administered medications for this visit.       SOCIAL HISTORY  Social History     Tobacco Use    Smoking status: Never    Smokeless tobacco: Never   Substance Use Topics    Alcohol use: No     Alcohol/week: 0.0 standard drinks of alcohol     Drug use: No     Living arrangements - the patient lives with their spouse.         Objective:            4/24/2023     9:40 AM 4/1/2024     9:40 AM   Timed 25 Foot Walk:   Did patient wear an AFO? No No   Was assistive device used? No No   Time for 25 Foot Walk (seconds) 3.7 3.4     Neurological Exam    MENTAL STATUS: Grossly intact    CRANIAL NERVE EXAM: no SEMAJ; no dysarthria;     MOTOR EXAM: She has increased tone, but is mild in upper extremities. RSM nl; strength 5/5 all groups     REFLEXES: left KJ > right KJ    SENSORY EXAM: slight decrease vibration in distal LE    COORDINATION: She has dystaxia on the left finger-to-nose evaluation.     GAIT: narrow based and stable     Imaging:     Results for orders placed during the hospital encounter of 12/26/24    MRI Brain Demyelinating Without Contrast    Impression  Stable MRI of the brain with multiple white matter lesions consistent with demyelinating plaques of MS. No new findings.      Electronically signed by: Aman Pastor MD  Date:    12/26/2024  Time:    10:41    No results found for this or any previous visit.    No results found for this or any previous visit.    Results for orders placed during the hospital encounter of 06/13/22    MRI Brain Demyelinating W W/O Contrast    Impression  Findings in the cerebral white matter which are in keeping with multiple sclerosis.  No new or enhancing lesions to suggest active disease.      Electronically signed by: Twin Cole  Date:    06/13/2022  Time:    18:03    Results for orders placed during the hospital encounter of 06/13/22    MRI Cervical Spine Demyelinating W W/O Contrast    Impression  Increased signal within the cervical spinal cord at C1-2 and T1.  The findings are not significantly changed when compared to the prior exam.    Please note evaluation is significantly limited by susceptibility artifacts from hardware within the cervical spine.      Electronically signed by: Twin  "Kelsey  Date:    06/13/2022  Time:    17:58    No results found for this or any previous visit.        Labs:     Lab Results   Component Value Date    FTTYFJME69YC 54 01/24/2025    HSJRNCFR68TP 106 (H) 12/13/2023    LBYCRFTO79ZG 53 07/15/2021     No results found for: "JCVINDEX", "JCVANTIBODY"  Lab Results   Component Value Date    CN4ISYGW 68.4 01/04/2022    ABSOLUTECD3 1210 01/04/2022    NR4OZFJQ 14.0 01/04/2022    ABSOLUTECD8 248 01/04/2022    DK0MVOPB 54.0 01/04/2022    ABSOLUTECD4 955 01/04/2022    LABCD48 3.85 (H) 01/04/2022     Lab Results   Component Value Date    WBC 6.38 01/24/2025    RBC 4.46 01/24/2025    HGB 13.3 01/24/2025    HCT 41.0 01/24/2025    MCV 92 01/24/2025    MCH 29.8 01/24/2025    MCHC 32.4 01/24/2025    RDW 12.9 01/24/2025     01/24/2025    MPV 10.7 01/24/2025    GRAN 4.2 01/24/2025    GRAN 65.0 01/24/2025    LYMPH 1.6 01/24/2025    LYMPH 24.3 01/24/2025    MONO 0.5 01/24/2025    MONO 8.3 01/24/2025    EOS 0.1 01/24/2025    BASO 0.05 01/24/2025    EOSINOPHIL 1.3 01/24/2025    BASOPHIL 0.8 01/24/2025     Sodium   Date Value Ref Range Status   01/17/2025 139 136 - 145 mmol/L Final     Potassium   Date Value Ref Range Status   01/17/2025 4.6 3.5 - 5.1 mmol/L Final     Chloride   Date Value Ref Range Status   01/17/2025 103 95 - 110 mmol/L Final     CO2   Date Value Ref Range Status   01/17/2025 25 23 - 29 mmol/L Final     Glucose   Date Value Ref Range Status   01/17/2025 97 70 - 110 mg/dL Final     BUN   Date Value Ref Range Status   01/17/2025 11 6 - 20 mg/dL Final     Creatinine   Date Value Ref Range Status   01/17/2025 0.9 0.5 - 1.4 mg/dL Final     Calcium   Date Value Ref Range Status   01/17/2025 9.5 8.7 - 10.5 mg/dL Final     Total Protein   Date Value Ref Range Status   01/17/2025 7.7 6.0 - 8.4 g/dL Final     Albumin   Date Value Ref Range Status   01/17/2025 3.8 3.5 - 5.2 g/dL Final     Total Bilirubin   Date Value Ref Range Status   01/17/2025 0.3 0.1 - 1.0 mg/dL Final     " Comment:     For infants and newborns, interpretation of results should be based  on gestational age, weight and in agreement with clinical  observations.    Premature Infant recommended reference ranges:  Up to 24 hours.............<8.0 mg/dL  Up to 48 hours............<12.0 mg/dL  3-5 days..................<15.0 mg/dL  6-29 days.................<15.0 mg/dL       Alkaline Phosphatase   Date Value Ref Range Status   01/17/2025 82 40 - 150 U/L Final     AST   Date Value Ref Range Status   01/17/2025 19 10 - 40 U/L Final     ALT   Date Value Ref Range Status   01/17/2025 14 10 - 44 U/L Final     Anion Gap   Date Value Ref Range Status   01/17/2025 11 8 - 16 mmol/L Final     eGFR if    Date Value Ref Range Status   07/15/2021 >60 >60 mL/min/1.73 m^2 Final     eGFR if non    Date Value Ref Range Status   07/15/2021 >60 >60 mL/min/1.73 m^2 Final     Comment:     Calculation used to obtain the estimated glomerular filtration  rate (eGFR) is the CKD-EPI equation.        Lab Results   Component Value Date    HEPBSAG Non-reactive 01/24/2025    HEPBSAB Positive 06/17/2022    HEPBCAB Non-reactive 01/24/2025           MS Impression and Plan:     NEURO MULTIPLE SCLEROSIS IMPRESSION:   Number of relapses in the past year?:  0  Clinical Progression:  Clinically Stable  MRI Progression:  Stable  MS Classification:  Relapsing-Remitting MS  Current DMT: ocrelizumab  DMT:  No change in management  Symptom Management:  No change in symptom management  Additional Impressions: FARIHA  Infusion later this month  Labs planned next July  F/u Mayra Jacob CNS in July [facility]       Problem List Items Addressed This Visit          Unprioritized    MS (multiple sclerosis) - Primary    Relevant Orders    CBC Auto Differential    Comprehensive Metabolic Panel    Hepatitis B Core Antibody, Total    Hepatitis B Surface Antigen    Immunoglobulins (IgG, IgA, IgM) Quantitative    Counseling regarding goals of care     Other  Visit Diagnoses       Immunotherapy        Relevant Orders    CBC Auto Differential    Comprehensive Metabolic Panel    Hepatitis B Core Antibody, Total    Hepatitis B Surface Antigen    Immunoglobulins (IgG, IgA, IgM) Quantitative    Prophylactic immunotherapy                Fauzia Matthews MD    I spent a total of 30 minutes on the day of the visit.This includes face to face time and non-face to face time preparing to see the patient (eg, review of tests), obtaining and/or reviewing separately obtained history, documenting clinical information in the electronic or other health record, independently interpreting results and communicating results to the patient/family/caregiver, or care coordinator.  Visit today included increased complexity associated with the care of the episodic problem : chronic immunotherapy; addressed and managing the longitudinal care of the patient due to the serious and/or complex managed problem(s) MS.

## 2025-03-07 ENCOUNTER — PATIENT MESSAGE (OUTPATIENT)
Dept: PSYCHIATRY | Facility: CLINIC | Age: 55
End: 2025-03-07
Payer: COMMERCIAL

## 2025-03-11 ENCOUNTER — PATIENT MESSAGE (OUTPATIENT)
Dept: NEUROLOGY | Facility: CLINIC | Age: 55
End: 2025-03-11
Payer: COMMERCIAL

## 2025-03-15 DIAGNOSIS — F32.A DEPRESSION, UNSPECIFIED DEPRESSION TYPE: ICD-10-CM

## 2025-03-17 RX ORDER — ARIPIPRAZOLE 2 MG/1
2 TABLET ORAL
Qty: 90 TABLET | Refills: 3 | Status: SHIPPED | OUTPATIENT
Start: 2025-03-17

## 2025-04-01 DIAGNOSIS — G47.00 INSOMNIA, UNSPECIFIED TYPE: ICD-10-CM

## 2025-04-01 RX ORDER — TRAZODONE HYDROCHLORIDE 50 MG/1
50 TABLET ORAL NIGHTLY
Qty: 90 TABLET | Refills: 1 | Status: SHIPPED | OUTPATIENT
Start: 2025-04-01

## 2025-04-02 DIAGNOSIS — M79.2 NEUROPATHIC PAIN: ICD-10-CM

## 2025-04-02 RX ORDER — PREGABALIN 100 MG/1
100 CAPSULE ORAL 2 TIMES DAILY
Qty: 14 CAPSULE | Refills: 0 | Status: SHIPPED | OUTPATIENT
Start: 2025-04-02 | End: 2025-04-04

## 2025-04-02 NOTE — TELEPHONE ENCOUNTER
----- Message from June sent at 4/2/2025  3:26 PM CDT -----  Regarding: Refill  Contact: AURA DENNIS [[id number]]  Is this a Refill or New Rx:RefillRx Name and Strength:pregabalin (LYRICA) 100 MG capsulePreferred Pharmacy with phone number:[facility] [place] - [phone] [address] [facility] [id number] [address] [place] [address]Xblrw: [phone] Fax: [phone] Preference:my chart Additional Information: Pt is out of medication for 2 days needing refill. Pt. Sates she has refill coming from mail order pharmacy but it hasn't arrived.

## 2025-04-03 ENCOUNTER — PATIENT MESSAGE (OUTPATIENT)
Dept: NEUROLOGY | Facility: CLINIC | Age: 55
End: 2025-04-03
Payer: COMMERCIAL

## 2025-04-03 DIAGNOSIS — M79.2 NEUROPATHIC PAIN: ICD-10-CM

## 2025-04-04 RX ORDER — PREGABALIN 100 MG/1
100 CAPSULE ORAL 3 TIMES DAILY
Start: 2025-04-04

## 2025-05-03 DIAGNOSIS — F32.A DEPRESSION, UNSPECIFIED DEPRESSION TYPE: ICD-10-CM

## 2025-05-03 DIAGNOSIS — G35 MS (MULTIPLE SCLEROSIS): ICD-10-CM

## 2025-05-03 DIAGNOSIS — R53.83 FATIGUE, UNSPECIFIED TYPE: ICD-10-CM

## 2025-05-05 RX ORDER — FLUOXETINE HYDROCHLORIDE 40 MG/1
40 CAPSULE ORAL
Qty: 90 CAPSULE | Refills: 3 | Status: SHIPPED | OUTPATIENT
Start: 2025-05-05

## 2025-05-13 ENCOUNTER — PATIENT MESSAGE (OUTPATIENT)
Dept: PSYCHIATRY | Facility: CLINIC | Age: 55
End: 2025-05-13
Payer: COMMERCIAL

## 2025-05-26 DIAGNOSIS — M79.2 NEUROPATHIC PAIN: ICD-10-CM

## 2025-05-26 DIAGNOSIS — M79.7 FIBROMYALGIA: ICD-10-CM

## 2025-05-26 DIAGNOSIS — G35 MULTIPLE SCLEROSIS: ICD-10-CM

## 2025-05-27 RX ORDER — PREGABALIN 75 MG/1
75 CAPSULE ORAL 3 TIMES DAILY
Qty: 90 CAPSULE | Refills: 1 | OUTPATIENT
Start: 2025-05-27

## 2025-05-27 RX ORDER — PREGABALIN 100 MG/1
100 CAPSULE ORAL 3 TIMES DAILY
Qty: 270 CAPSULE | Refills: 1 | Status: SHIPPED | OUTPATIENT
Start: 2025-05-27

## 2025-06-05 DIAGNOSIS — M62.838 MUSCLE SPASM: ICD-10-CM

## 2025-06-05 RX ORDER — TIZANIDINE 4 MG/1
4 TABLET ORAL NIGHTLY
Qty: 90 TABLET | Refills: 1 | Status: SHIPPED | OUTPATIENT
Start: 2025-06-05

## 2025-06-19 ENCOUNTER — PATIENT MESSAGE (OUTPATIENT)
Dept: PSYCHIATRY | Facility: CLINIC | Age: 55
End: 2025-06-19
Payer: COMMERCIAL

## 2025-07-07 ENCOUNTER — LAB VISIT (OUTPATIENT)
Dept: LAB | Facility: HOSPITAL | Age: 55
End: 2025-07-07
Attending: PSYCHIATRY & NEUROLOGY
Payer: COMMERCIAL

## 2025-07-07 DIAGNOSIS — Z29.89 IMMUNOTHERAPY: ICD-10-CM

## 2025-07-07 DIAGNOSIS — G35 MS (MULTIPLE SCLEROSIS): ICD-10-CM

## 2025-07-07 LAB
ABSOLUTE EOSINOPHIL (OHS): 0.05 K/UL
ABSOLUTE MONOCYTE (OHS): 0.36 K/UL (ref 0.3–1)
ABSOLUTE NEUTROPHIL COUNT (OHS): 4.38 K/UL (ref 1.8–7.7)
ALBUMIN SERPL BCP-MCNC: 4.2 G/DL (ref 3.5–5.2)
ALP SERPL-CCNC: 82 UNIT/L (ref 40–150)
ALT SERPL W/O P-5'-P-CCNC: 14 UNIT/L (ref 10–44)
ANION GAP (OHS): 7 MMOL/L (ref 8–16)
AST SERPL-CCNC: 19 UNIT/L (ref 11–45)
BASOPHILS # BLD AUTO: 0.04 K/UL
BASOPHILS NFR BLD AUTO: 0.7 %
BILIRUB SERPL-MCNC: 0.5 MG/DL (ref 0.1–1)
BUN SERPL-MCNC: 15 MG/DL (ref 6–20)
CALCIUM SERPL-MCNC: 9.4 MG/DL (ref 8.7–10.5)
CHLORIDE SERPL-SCNC: 107 MMOL/L (ref 95–110)
CO2 SERPL-SCNC: 27 MMOL/L (ref 23–29)
CREAT SERPL-MCNC: 0.9 MG/DL (ref 0.5–1.4)
ERYTHROCYTE [DISTWIDTH] IN BLOOD BY AUTOMATED COUNT: 12.7 % (ref 11.5–14.5)
GFR SERPLBLD CREATININE-BSD FMLA CKD-EPI: >60 ML/MIN/1.73/M2
GLUCOSE SERPL-MCNC: 76 MG/DL (ref 70–110)
HBV CORE AB SERPL QL IA: NORMAL
HBV SURFACE AG SERPL QL IA: NORMAL
HCT VFR BLD AUTO: 42.2 % (ref 37–48.5)
HGB BLD-MCNC: 13.8 GM/DL (ref 12–16)
IGA SERPL-MCNC: 525 MG/DL (ref 40–350)
IGG SERPL-MCNC: 1015 MG/DL (ref 650–1600)
IGM SERPL-MCNC: 54 MG/DL (ref 50–300)
IMM GRANULOCYTES # BLD AUTO: 0.03 K/UL (ref 0–0.04)
IMM GRANULOCYTES NFR BLD AUTO: 0.5 % (ref 0–0.5)
LYMPHOCYTES # BLD AUTO: 1.06 K/UL (ref 1–4.8)
MCH RBC QN AUTO: 30.3 PG (ref 27–31)
MCHC RBC AUTO-ENTMCNC: 32.7 G/DL (ref 32–36)
MCV RBC AUTO: 93 FL (ref 82–98)
NUCLEATED RBC (/100WBC) (OHS): 0 /100 WBC
PLATELET # BLD AUTO: 374 K/UL (ref 150–450)
PMV BLD AUTO: 10.4 FL (ref 9.2–12.9)
POTASSIUM SERPL-SCNC: 4.1 MMOL/L (ref 3.5–5.1)
PROT SERPL-MCNC: 6.9 GM/DL (ref 6–8.4)
RBC # BLD AUTO: 4.56 M/UL (ref 4–5.4)
RELATIVE EOSINOPHIL (OHS): 0.8 %
RELATIVE LYMPHOCYTE (OHS): 17.9 % (ref 18–48)
RELATIVE MONOCYTE (OHS): 6.1 % (ref 4–15)
RELATIVE NEUTROPHIL (OHS): 74 % (ref 38–73)
SODIUM SERPL-SCNC: 141 MMOL/L (ref 136–145)
WBC # BLD AUTO: 5.92 K/UL (ref 3.9–12.7)

## 2025-07-07 PROCEDURE — 85025 COMPLETE CBC W/AUTO DIFF WBC: CPT

## 2025-07-07 PROCEDURE — 87340 HEPATITIS B SURFACE AG IA: CPT

## 2025-07-07 PROCEDURE — 82784 ASSAY IGA/IGD/IGG/IGM EACH: CPT

## 2025-07-07 PROCEDURE — 82040 ASSAY OF SERUM ALBUMIN: CPT

## 2025-07-07 PROCEDURE — 36415 COLL VENOUS BLD VENIPUNCTURE: CPT | Mod: PN

## 2025-07-07 PROCEDURE — 86704 HEP B CORE ANTIBODY TOTAL: CPT

## 2025-07-14 ENCOUNTER — OFFICE VISIT (OUTPATIENT)
Dept: NEUROLOGY | Facility: CLINIC | Age: 55
End: 2025-07-14
Payer: COMMERCIAL

## 2025-07-14 ENCOUNTER — TELEPHONE (OUTPATIENT)
Dept: NEUROLOGY | Facility: CLINIC | Age: 55
End: 2025-07-14
Payer: COMMERCIAL

## 2025-07-14 DIAGNOSIS — F32.A DEPRESSION, UNSPECIFIED DEPRESSION TYPE: ICD-10-CM

## 2025-07-14 DIAGNOSIS — Z79.899 HIGH RISK MEDICATION USE: ICD-10-CM

## 2025-07-14 DIAGNOSIS — G35 MULTIPLE SCLEROSIS: Primary | ICD-10-CM

## 2025-07-14 DIAGNOSIS — Z29.89 PROPHYLACTIC IMMUNOTHERAPY: ICD-10-CM

## 2025-07-14 DIAGNOSIS — Z71.89 COUNSELING REGARDING GOALS OF CARE: ICD-10-CM

## 2025-07-14 DIAGNOSIS — M79.2 NERVE PAIN: ICD-10-CM

## 2025-07-14 PROCEDURE — 98005 SYNCH AUDIO-VIDEO EST LOW 20: CPT | Mod: 95,,, | Performed by: CLINICAL NURSE SPECIALIST

## 2025-07-14 PROCEDURE — G2211 COMPLEX E/M VISIT ADD ON: HCPCS | Mod: 95,,, | Performed by: CLINICAL NURSE SPECIALIST

## 2025-07-14 RX ORDER — SEMAGLUTIDE 0.5 MG/.5ML
0.5 INJECTION, SOLUTION SUBCUTANEOUS
COMMUNITY

## 2025-07-14 RX ORDER — OXYBUTYNIN CHLORIDE 15 MG/1
15 TABLET, EXTENDED RELEASE ORAL
COMMUNITY
Start: 2025-06-30 | End: 2026-06-30

## 2025-07-14 NOTE — TELEPHONE ENCOUNTER
Ocrevus  Infusion scheduled: 9/3/25 - per Levy at [facility]  Authorization: handled by [facility]  Labs:  7/7/25  WBC 5.92  ALC 1060  AST 19, ALT 14  IgG 1015 IgM 54 IgA 525  HBsAg - anti-Hbc -, no current or past infection    Orders signed: Levy at [facility] states no new orders are needed.     Spoke to Levy pharmacist at [facility] and advised to d/c pre-med Solu-medrol for Ocrevus infusion.

## 2025-07-14 NOTE — PROGRESS NOTES
Subjective:          Patient ID: Kasandra Garcia is a 55 y.o. female who presents today for a routine clinic visit for MS.        The patient location is: her home   The chief complaint leading to consultation is: MS     Visit type: audiovisual    Face to Face time with patient: 10 minutes  20 minutes of total time spent on the encounter, which includes face to face time and non-face to face time preparing to see the patient (eg, review of tests), Obtaining and/or reviewing separately obtained history, Documenting clinical information in the electronic or other health record, Independently interpreting results (not separately reported) and communicating results to the patient/family/caregiver, or Care coordination (not separately reported).       Each patient to whom he or she provides medical services by telemedicine is:  (1) informed of the relationship between the physician and patient and the respective role of any other health care provider with respect to management of the patient; and (2) notified that he or she may decline to receive medical services by telemedicine and may withdraw from such care at any time.      MS HPI:  DMT: Ocrevus--due next in August   Side effects from DMT? No  Taking vitamin D3 as recommended? Yes -  Dose: 5000 units daily   She denies any recent infections.   She stays active with her grandchildren.   She denies any signs of relapse or any worsening. She is doing well.     Medications:  Current Outpatient Medications   Medication Sig    ARIPiprazole (ABILIFY) 2 MG Tab TAKE 1 TABLET ONCE DAILY    cholecalciferol, vitamin D3, (VITAMIN D3) 5,000 unit Tab Take 5,000 Units by mouth once daily.    estradiol 0.05 mg/24 hr td ptsw (VIVELLE-DOT) 0.05 mg/24 hr     FLUoxetine 40 MG capsule TAKE 1 CAPSULE ONCE DAILY    ketoconazole (NIZORAL) 2 % shampoo APPLY TOPICALLY ONCE A     WEEK, LATHER IN FOR 5-10   MINUTES BEFORE RINSING    nitrofurantoin, macrocrystal-monohydrate, (MACROBID) 100 MG  capsule Take 100 mg by mouth. Daily for UTI prevention    oxybutynin (DITROPAN XL) 15 MG TR24 Take 15 mg by mouth.    pregabalin (LYRICA) 100 MG capsule Take 1 capsule (100 mg total) by mouth 3 (three) times daily. (Patient taking differently: Take 100 mg by mouth 2 (two) times daily.)    tiZANidine (ZANAFLEX) 4 MG tablet TAKE 1 TABLET EVERY EVENING    traZODone (DESYREL) 50 MG tablet TAKE 1 TABLET EVERY EVENING    0.9% NaCl SolP 500 mL with ocrelizumab 30 mg/mL Soln Infuse Ocrevus 600mg in 500mL of 0.9% NaCl IV every 24 weeks. Pre-meds: Solumedrol 100mg IVPB, Benadryl 50mg IVPB, Tylenol 1000mg PO, Pepcid 20mg IVPB    WEGOVY 0.5 mg/0.5 mL PnIj Inject 0.5 mg into the skin every 7 days.     No current facility-administered medications for this visit.       SOCIAL HISTORY  Social History[1]    Living arrangements - the patient lives with her      ROS:      7/14/25   REVIEW OF SYMPTOMS   Do you feel abnormally tired on most days? No   Do you feel you generally sleep well? Yes   Do you have difficulty controlling your bladder?  Yes--takes oxybutynin; denies UTIs--on Macrobid as preventative    Do you have difficulty controlling your bowels?  No   Do you have frequent muscle cramps, tightness or spasms in your limbs?  No    Do you have new visual symptoms?  No--does not see eye doctor    Do you have worsening difficulty with your memory or thinking? No   Do you have worsening symptoms of anxiety or depression?  No--controlled with fluoxetine    For patients who walk, Do you have more difficulty walking?  No   Have you fallen since your last visit?  No   For patients who use wheelchairs: Do you have any skin wounds or breakdown? Not Applicable   Do you have difficulty using your hands?  No   Do you have shooting or burning pain? Yes--pregabalin helps    Do you have difficulty with sexual function?  Not assessed    If you are sexually active, are you using birth control? Y/N  N/A No   Do you often choke when  swallowing liquids or solid food?  No   Do you experience worsening symptoms when overheated? No   Do you need any new equipment such as a wheelchair, walker or shower chair? No   Do you receive co-pay financial assistance for your principal MS medicine? Yes   Would you be interested in participating in an MS research trial in the future? Yes   For patients on Gilenya, Tecfidera, Aubagio, Rituxan, Ocrevus, Tysabri, Lemtrada or Methotrexate, are you aware that you should NOT receive live virus vaccines?  Yes   Do you feel you have adequate family/friend support?  Yes   Do you have health insurance?   Yes   Are you currently employed? No   Do you receive SSDI/SSI?  No   Do you use marijuana or cannabis products? No   Have you been diagnosed with a urinary tract infection since your last visit here? No   Have you been diagnosed with a respiratory tract infection since your last visit here? No   Have you been to the emergency room since your last visit here? No   Have you been hospitalized since your last visit here?  No            Objective:        1. 25 foot timed walk:      4/24/2023     9:40 AM 4/1/2024     9:40 AM   Timed 25 Foot Walk:   Did patient wear an AFO? No No   Was assistive device used? No No   Time for 25 Foot Walk (seconds) 3.7 3.4       Neurological Exam    Deferred   Imaging:     Results for orders placed during the hospital encounter of 12/26/24    MRI Brain Demyelinating Without Contrast    Impression  Stable MRI of the brain with multiple white matter lesions consistent with demyelinating plaques of MS. No new findings.      Electronically signed by: Aman Pastor MD  Date:    12/26/2024  Time:    10:41    Labs:     Lab Results   Component Value Date    DLZGMVOR65IC 54 01/24/2025    NFYJDVQU02JL 106 (H) 12/13/2023    UJIKZTQS26PE 53 07/15/2021     Lab Results   Component Value Date    JH0ZXDIY 68.4 01/04/2022    ABSOLUTECD3 1210 01/04/2022    BQ6IMGPO 14.0 01/04/2022    ABSOLUTECD8 248  01/04/2022    EG0XCKSD 54.0 01/04/2022    ABSOLUTECD4 955 01/04/2022    LABCD48 3.85 (H) 01/04/2022     Lab Results   Component Value Date    WBC 5.92 07/07/2025    RBC 4.56 07/07/2025    HGB 13.8 07/07/2025    HCT 42.2 07/07/2025    MCV 93 07/07/2025    MCH 30.3 07/07/2025    MCHC 32.7 07/07/2025    RDW 12.7 07/07/2025     07/07/2025    MPV 10.4 07/07/2025    GRAN 4.2 01/24/2025    GRAN 65.0 01/24/2025    LYMPH 17.9 (L) 07/07/2025    LYMPH 1.06 07/07/2025    MONO 6.1 07/07/2025    MONO 0.36 07/07/2025    EOS 0.8 07/07/2025    EOS 0.05 07/07/2025    BASO 0.05 01/24/2025    EOSINOPHIL 1.3 01/24/2025    BASOPHIL 0.7 07/07/2025    BASOPHIL 0.04 07/07/2025     Sodium   Date Value Ref Range Status   07/07/2025 141 136 - 145 mmol/L Final   01/17/2025 139 136 - 145 mmol/L Final     Potassium   Date Value Ref Range Status   07/07/2025 4.1 3.5 - 5.1 mmol/L Final   01/17/2025 4.6 3.5 - 5.1 mmol/L Final     Chloride   Date Value Ref Range Status   07/07/2025 107 95 - 110 mmol/L Final   01/17/2025 103 95 - 110 mmol/L Final     CO2   Date Value Ref Range Status   07/07/2025 27 23 - 29 mmol/L Final   01/17/2025 25 23 - 29 mmol/L Final     Glucose   Date Value Ref Range Status   07/07/2025 76 70 - 110 mg/dL Final   01/17/2025 97 70 - 110 mg/dL Final     BUN   Date Value Ref Range Status   07/07/2025 15 6 - 20 mg/dL Final     Creatinine   Date Value Ref Range Status   07/07/2025 0.9 0.5 - 1.4 mg/dL Final     Calcium   Date Value Ref Range Status   07/07/2025 9.4 8.7 - 10.5 mg/dL Final   01/17/2025 9.5 8.7 - 10.5 mg/dL Final     Protein Total   Date Value Ref Range Status   07/07/2025 6.9 6.0 - 8.4 gm/dL Final     Total Protein   Date Value Ref Range Status   01/17/2025 7.7 6.0 - 8.4 g/dL Final     Albumin   Date Value Ref Range Status   07/07/2025 4.2 3.5 - 5.2 g/dL Final   01/17/2025 3.8 3.5 - 5.2 g/dL Final     Total Bilirubin   Date Value Ref Range Status   01/17/2025 0.3 0.1 - 1.0 mg/dL Final     Comment:     For infants  and newborns, interpretation of results should be based  on gestational age, weight and in agreement with clinical  observations.    Premature Infant recommended reference ranges:  Up to 24 hours.............<8.0 mg/dL  Up to 48 hours............<12.0 mg/dL  3-5 days..................<15.0 mg/dL  6-29 days.................<15.0 mg/dL       Bilirubin Total   Date Value Ref Range Status   07/07/2025 0.5 0.1 - 1.0 mg/dL Final     Comment:     For infants and newborns, interpretation of results should be based   on gestational age, weight and in agreement with clinical   observations.    Premature Infant recommended reference ranges:   0-24 hours:  <8.0 mg/dL   24-48 hours: <12.0 mg/dL   3-5 days:    <15.0 mg/dL   6-29 days:   <15.0 mg/dL     Alkaline Phosphatase   Date Value Ref Range Status   01/17/2025 82 40 - 150 U/L Final     ALP   Date Value Ref Range Status   07/07/2025 82 40 - 150 unit/L Final     AST   Date Value Ref Range Status   07/07/2025 19 11 - 45 unit/L Final   01/17/2025 19 10 - 40 U/L Final     ALT   Date Value Ref Range Status   07/07/2025 14 10 - 44 unit/L Final   01/17/2025 14 10 - 44 U/L Final     Anion Gap   Date Value Ref Range Status   07/07/2025 7 (L) 8 - 16 mmol/L Final     eGFR if    Date Value Ref Range Status   07/15/2021 >60 >60 mL/min/1.73 m^2 Final     eGFR if non    Date Value Ref Range Status   07/15/2021 >60 >60 mL/min/1.73 m^2 Final     Comment:     Calculation used to obtain the estimated glomerular filtration  rate (eGFR) is the CKD-EPI equation.        Lab Results   Component Value Date    HEPBSAG Non-Reactive 07/07/2025    HEPBSAB Positive 06/17/2022    HEPBCAB Non-Reactive 07/07/2025           MS Impression and Plan:     NEURO MULTIPLE SCLEROSIS IMPRESSION:   Number of relapses in the past year?:  0  Clinical Progression:  Clinically Stable  MS Classification:  Relapsing-Remitting MS  Current DMT: ocrelizumab  Current DMT comment: Continue Ocrevus  and Vitamin D. Her next infusion is due in August. Safety labs have been reviewed. She is aware of the risks associated with immunosuppressant therapy, including increased risk of infection.     DMT:  No change in management  Symptom Management:  No change in symptom management  Symptom Management comment: Continue all symptom management meds as prescribed.     MRI brain is due in December.   Next labs are due in January.   I will see her back in clinic in January.   The visit today is associated with current or anticipated ongoing medical care related to this patient's single serious condition/complex condition of multiple sclerosis.          SHALOM Donohue, CNS    Problem List Items Addressed This Visit          Neurologic Problems    Multiple sclerosis - Primary (Chronic)    Relevant Orders    MRI Brain Demyelinating Without Contrast    CBC Auto Differential    Comprehensive Metabolic Panel    Hepatitis B Core Antibody, Total    Hepatitis B Surface Antigen    Immunoglobulins (IgG, IgA, IgM) Quantitative    Vitamin D       Other    Counseling regarding goals of care     Other Visit Diagnoses         Prophylactic immunotherapy          High risk medication use          Depression, unspecified depression type          Nerve pain                       [1]   Social History  Tobacco Use    Smoking status: Never    Smokeless tobacco: Never   Substance Use Topics    Alcohol use: No     Alcohol/week: 0.0 standard drinks of alcohol    Drug use: No

## 2025-07-14 NOTE — Clinical Note
MRI in December F/u with me in clinic in January; will get labs after that visit, but orders are in.

## 2025-07-17 ENCOUNTER — TELEPHONE (OUTPATIENT)
Dept: NEUROLOGY | Facility: CLINIC | Age: 55
End: 2025-07-17
Payer: COMMERCIAL

## 2025-07-30 ENCOUNTER — PATIENT MESSAGE (OUTPATIENT)
Dept: PSYCHIATRY | Facility: CLINIC | Age: 55
End: 2025-07-30
Payer: COMMERCIAL

## 2025-08-01 ENCOUNTER — PATIENT MESSAGE (OUTPATIENT)
Dept: PSYCHIATRY | Facility: CLINIC | Age: 55
End: 2025-08-01
Payer: COMMERCIAL